# Patient Record
Sex: MALE | Race: WHITE | NOT HISPANIC OR LATINO | Employment: OTHER | URBAN - NONMETROPOLITAN AREA
[De-identification: names, ages, dates, MRNs, and addresses within clinical notes are randomized per-mention and may not be internally consistent; named-entity substitution may affect disease eponyms.]

---

## 2018-05-24 ENCOUNTER — OFFICE VISIT CONVERTED (OUTPATIENT)
Dept: FAMILY MEDICINE CLINIC | Age: 73
End: 2018-05-24
Attending: NURSE PRACTITIONER

## 2019-02-27 ENCOUNTER — OFFICE VISIT CONVERTED (OUTPATIENT)
Dept: FAMILY MEDICINE CLINIC | Age: 74
End: 2019-02-27
Attending: FAMILY MEDICINE

## 2019-03-07 ENCOUNTER — OFFICE VISIT CONVERTED (OUTPATIENT)
Dept: FAMILY MEDICINE CLINIC | Age: 74
End: 2019-03-07
Attending: FAMILY MEDICINE

## 2019-03-07 ENCOUNTER — HOSPITAL ENCOUNTER (OUTPATIENT)
Dept: OTHER | Facility: HOSPITAL | Age: 74
Discharge: HOME | End: 2019-03-07
Attending: FAMILY MEDICINE

## 2019-03-07 LAB
ALBUMIN SERPL-MCNC: 3.6 G/DL (ref 3.5–5)
ALBUMIN/GLOB SERPL: 1.3 {RATIO} (ref 1.4–2.6)
ALP SERPL-CCNC: 107 U/L (ref 56–155)
ALT SERPL-CCNC: 34 U/L (ref 10–40)
ANION GAP SERPL CALC-SCNC: 14 MMOL/L (ref 8–19)
AST SERPL-CCNC: 48 U/L (ref 15–50)
BASOPHILS # BLD MANUAL: 0.04 10*3/UL (ref 0–0.2)
BASOPHILS NFR BLD MANUAL: 0.5 % (ref 0–3)
BILIRUB SERPL-MCNC: 0.63 MG/DL (ref 0.2–1.3)
BUN SERPL-MCNC: 14 MG/DL (ref 5–25)
BUN/CREAT SERPL: 15 {RATIO} (ref 6–20)
CALCIUM SERPL-MCNC: 9.4 MG/DL (ref 8.7–10.4)
CHLORIDE SERPL-SCNC: 104 MMOL/L (ref 99–111)
CHOLEST SERPL-MCNC: 128 MG/DL (ref 107–200)
CHOLEST/HDLC SERPL: 2.2 {RATIO} (ref 3–6)
CONV CO2: 28 MMOL/L (ref 22–32)
CONV TOTAL PROTEIN: 6.4 G/DL (ref 6.3–8.2)
CREAT UR-MCNC: 0.96 MG/DL (ref 0.7–1.2)
DEPRECATED RDW RBC AUTO: 46 FL
EOSINOPHIL # BLD MANUAL: 0.06 10*3/UL (ref 0–0.7)
EOSINOPHIL NFR BLD MANUAL: 0.8 % (ref 0–7)
ERYTHROCYTE [DISTWIDTH] IN BLOOD BY AUTOMATED COUNT: 11.7 % (ref 11.5–14.5)
GFR SERPLBLD BASED ON 1.73 SQ M-ARVRAT: >60 ML/MIN/{1.73_M2}
GLOBULIN UR ELPH-MCNC: 2.8 G/DL (ref 2–3.5)
GLUCOSE SERPL-MCNC: 108 MG/DL (ref 70–99)
GRANS (ABSOLUTE): 6.1 10*3/UL (ref 2–8)
GRANS: 78.5 % (ref 30–85)
HBA1C MFR BLD: 14.2 G/DL (ref 14–18)
HCT VFR BLD AUTO: 42 % (ref 42–52)
HDLC SERPL-MCNC: 57 MG/DL (ref 40–60)
IMM GRANULOCYTES # BLD: 0 10*3/UL (ref 0–0.54)
IMM GRANULOCYTES NFR BLD: 0 % (ref 0–0.43)
LDLC SERPL CALC-MCNC: 57 MG/DL (ref 70–100)
LYMPHOCYTES # BLD MANUAL: 0.87 10*3/UL (ref 1–5)
LYMPHOCYTES NFR BLD MANUAL: 9 % (ref 3–10)
MCH RBC QN AUTO: 35.1 PG (ref 27–31)
MCHC RBC AUTO-ENTMCNC: 33.8 G/DL (ref 33–37)
MCV RBC AUTO: 103.7 FL (ref 80–96)
MONOCYTES # BLD AUTO: 0.7 10*3/UL (ref 0.2–1.2)
OSMOLALITY SERPL CALC.SUM OF ELEC: 295 MOSM/KG (ref 273–304)
PLATELET # BLD AUTO: 189 10*3/UL (ref 130–400)
PMV BLD AUTO: 10.8 FL (ref 7.4–10.4)
POTASSIUM SERPL-SCNC: 4 MMOL/L (ref 3.5–5.3)
RBC # BLD AUTO: 4.05 10*6/UL (ref 4.7–6.1)
SODIUM SERPL-SCNC: 142 MMOL/L (ref 135–147)
TRIGL SERPL-MCNC: 72 MG/DL (ref 40–150)
TSH SERPL-ACNC: 0.53 M[IU]/L (ref 0.27–4.2)
VARIANT LYMPHS NFR BLD MANUAL: 11.2 % (ref 20–45)
VLDLC SERPL-MCNC: 14 MG/DL (ref 5–37)
WBC # BLD AUTO: 7.77 10*3/UL (ref 4.8–10.8)

## 2019-03-28 ENCOUNTER — OFFICE VISIT CONVERTED (OUTPATIENT)
Dept: FAMILY MEDICINE CLINIC | Age: 74
End: 2019-03-28
Attending: FAMILY MEDICINE

## 2019-04-01 ENCOUNTER — HOSPITAL ENCOUNTER (OUTPATIENT)
Dept: OTHER | Facility: HOSPITAL | Age: 74
Discharge: HOME | End: 2019-04-01
Attending: FAMILY MEDICINE

## 2019-04-29 ENCOUNTER — OFFICE VISIT CONVERTED (OUTPATIENT)
Dept: FAMILY MEDICINE CLINIC | Age: 74
End: 2019-04-29
Attending: FAMILY MEDICINE

## 2019-04-29 ENCOUNTER — HOSPITAL ENCOUNTER (OUTPATIENT)
Dept: OTHER | Facility: HOSPITAL | Age: 74
Discharge: HOME | End: 2019-04-29
Attending: FAMILY MEDICINE

## 2019-07-15 ENCOUNTER — OFFICE VISIT CONVERTED (OUTPATIENT)
Dept: FAMILY MEDICINE CLINIC | Age: 74
End: 2019-07-15
Attending: FAMILY MEDICINE

## 2019-07-17 ENCOUNTER — HOSPITAL ENCOUNTER (OUTPATIENT)
Dept: OTHER | Facility: HOSPITAL | Age: 74
Discharge: HOME | End: 2019-07-17
Attending: FAMILY MEDICINE

## 2019-07-17 LAB
ALBUMIN SERPL-MCNC: 3.6 G/DL (ref 3.5–5)
ALBUMIN/GLOB SERPL: 1.2 {RATIO} (ref 1.4–2.6)
ALP SERPL-CCNC: 118 U/L (ref 56–155)
ALT SERPL-CCNC: 29 U/L (ref 10–40)
ANION GAP SERPL CALC-SCNC: 15 MMOL/L (ref 8–19)
AST SERPL-CCNC: 35 U/L (ref 15–50)
BASOPHILS # BLD MANUAL: 0.03 10*3/UL (ref 0–0.2)
BASOPHILS NFR BLD MANUAL: 0.6 % (ref 0–3)
BILIRUB SERPL-MCNC: 0.56 MG/DL (ref 0.2–1.3)
BUN SERPL-MCNC: 12 MG/DL (ref 5–25)
BUN/CREAT SERPL: 13 {RATIO} (ref 6–20)
CALCIUM SERPL-MCNC: 9.6 MG/DL (ref 8.7–10.4)
CHLORIDE SERPL-SCNC: 102 MMOL/L (ref 99–111)
CHOLEST SERPL-MCNC: 164 MG/DL (ref 107–200)
CHOLEST/HDLC SERPL: 2.6 {RATIO} (ref 3–6)
CONV CO2: 28 MMOL/L (ref 22–32)
CONV TOTAL PROTEIN: 6.5 G/DL (ref 6.3–8.2)
CREAT UR-MCNC: 0.91 MG/DL (ref 0.7–1.2)
DEPRECATED RDW RBC AUTO: 48.4 FL
EOSINOPHIL # BLD MANUAL: 0.34 10*3/UL (ref 0–0.7)
EOSINOPHIL NFR BLD MANUAL: 6.8 % (ref 0–7)
ERYTHROCYTE [DISTWIDTH] IN BLOOD BY AUTOMATED COUNT: 14.5 % (ref 11.5–14.5)
GFR SERPLBLD BASED ON 1.73 SQ M-ARVRAT: >60 ML/MIN/{1.73_M2}
GLOBULIN UR ELPH-MCNC: 2.9 G/DL (ref 2–3.5)
GLUCOSE SERPL-MCNC: 98 MG/DL (ref 70–99)
GRANS (ABSOLUTE): 2.93 10*3/UL (ref 2–8)
GRANS: 58.9 % (ref 30–85)
HBA1C MFR BLD: 13.7 G/DL (ref 14–18)
HCT VFR BLD AUTO: 39.5 % (ref 42–52)
HDLC SERPL-MCNC: 63 MG/DL (ref 40–60)
IMM GRANULOCYTES # BLD: 0.01 10*3/UL (ref 0–0.54)
IMM GRANULOCYTES NFR BLD: 0.2 % (ref 0–0.43)
LDLC SERPL CALC-MCNC: 87 MG/DL (ref 70–100)
LYMPHOCYTES # BLD MANUAL: 1.32 10*3/UL (ref 1–5)
LYMPHOCYTES NFR BLD MANUAL: 7 % (ref 3–10)
MCH RBC QN AUTO: 31.1 PG (ref 27–31)
MCHC RBC AUTO-ENTMCNC: 34.7 G/DL (ref 33–37)
MCV RBC AUTO: 89.8 FL (ref 80–96)
MONOCYTES # BLD AUTO: 0.35 10*3/UL (ref 0.2–1.2)
OSMOLALITY SERPL CALC.SUM OF ELEC: 292 MOSM/KG (ref 273–304)
PLATELET # BLD AUTO: 170 10*3/UL (ref 130–400)
PMV BLD AUTO: 12 FL (ref 7.4–10.4)
POTASSIUM SERPL-SCNC: 4.1 MMOL/L (ref 3.5–5.3)
RBC # BLD AUTO: 4.4 10*6/UL (ref 4.7–6.1)
SODIUM SERPL-SCNC: 141 MMOL/L (ref 135–147)
TRIGL SERPL-MCNC: 68 MG/DL (ref 40–150)
TSH SERPL-ACNC: 0.46 M[IU]/L (ref 0.27–4.2)
VARIANT LYMPHS NFR BLD MANUAL: 26.5 % (ref 20–45)
VLDLC SERPL-MCNC: 14 MG/DL (ref 5–37)
WBC # BLD AUTO: 4.98 10*3/UL (ref 4.8–10.8)

## 2019-08-19 ENCOUNTER — OFFICE VISIT CONVERTED (OUTPATIENT)
Dept: FAMILY MEDICINE CLINIC | Age: 74
End: 2019-08-19
Attending: FAMILY MEDICINE

## 2019-09-26 ENCOUNTER — OFFICE VISIT CONVERTED (OUTPATIENT)
Dept: FAMILY MEDICINE CLINIC | Age: 74
End: 2019-09-26
Attending: FAMILY MEDICINE

## 2019-09-26 ENCOUNTER — HOSPITAL ENCOUNTER (OUTPATIENT)
Dept: OTHER | Facility: HOSPITAL | Age: 74
Discharge: HOME | End: 2019-09-26
Attending: FAMILY MEDICINE

## 2019-09-26 LAB
ALBUMIN SERPL-MCNC: 3.9 G/DL (ref 3.5–5)
ALBUMIN/GLOB SERPL: 1.4 {RATIO} (ref 1.4–2.6)
ALP SERPL-CCNC: 96 U/L (ref 56–155)
ALT SERPL-CCNC: 13 U/L (ref 10–40)
ANION GAP SERPL CALC-SCNC: 16 MMOL/L (ref 8–19)
AST SERPL-CCNC: 24 U/L (ref 15–50)
BASOPHILS # BLD AUTO: 0.04 10*3/UL (ref 0–0.2)
BASOPHILS NFR BLD AUTO: 0.7 % (ref 0–3)
BILIRUB SERPL-MCNC: 0.33 MG/DL (ref 0.2–1.3)
BUN SERPL-MCNC: 22 MG/DL (ref 5–25)
BUN/CREAT SERPL: 22 {RATIO} (ref 6–20)
CALCIUM SERPL-MCNC: 9.7 MG/DL (ref 8.7–10.4)
CHLORIDE SERPL-SCNC: 102 MMOL/L (ref 99–111)
CONV ABS IMM GRAN: 0.01 10*3/UL (ref 0–0.2)
CONV CO2: 27 MMOL/L (ref 22–32)
CONV IMMATURE GRAN: 0.2 % (ref 0–1.8)
CONV TOTAL PROTEIN: 6.7 G/DL (ref 6.3–8.2)
CREAT UR-MCNC: 0.98 MG/DL (ref 0.7–1.2)
DEPRECATED RDW RBC AUTO: 45.3 FL (ref 35.1–43.9)
EOSINOPHIL # BLD AUTO: 0.63 10*3/UL (ref 0–0.7)
EOSINOPHIL # BLD AUTO: 10.3 % (ref 0–7)
ERYTHROCYTE [DISTWIDTH] IN BLOOD BY AUTOMATED COUNT: 13.1 % (ref 11.6–14.4)
GFR SERPLBLD BASED ON 1.73 SQ M-ARVRAT: >60 ML/MIN/{1.73_M2}
GLOBULIN UR ELPH-MCNC: 2.8 G/DL (ref 2–3.5)
GLUCOSE SERPL-MCNC: 96 MG/DL (ref 70–99)
HCT VFR BLD AUTO: 41 % (ref 42–52)
HGB BLD-MCNC: 13.7 G/DL (ref 14–18)
LYMPHOCYTES # BLD AUTO: 1.33 10*3/UL (ref 1–5)
LYMPHOCYTES NFR BLD AUTO: 21.7 % (ref 20–45)
MCH RBC QN AUTO: 31.4 PG (ref 27–31)
MCHC RBC AUTO-ENTMCNC: 33.4 G/DL (ref 33–37)
MCV RBC AUTO: 94 FL (ref 80–96)
MONOCYTES # BLD AUTO: 0.5 10*3/UL (ref 0.2–1.2)
MONOCYTES NFR BLD AUTO: 8.2 % (ref 3–10)
NEUTROPHILS # BLD AUTO: 3.61 10*3/UL (ref 2–8)
NEUTROPHILS NFR BLD AUTO: 58.9 % (ref 30–85)
NRBC CBCN: 0 % (ref 0–0.7)
OSMOLALITY SERPL CALC.SUM OF ELEC: 295 MOSM/KG (ref 273–304)
PLATELET # BLD AUTO: 160 10*3/UL (ref 130–400)
PMV BLD AUTO: 12.9 FL (ref 9.4–12.4)
POTASSIUM SERPL-SCNC: 4.2 MMOL/L (ref 3.5–5.3)
RBC # BLD AUTO: 4.36 10*6/UL (ref 4.7–6.1)
SODIUM SERPL-SCNC: 141 MMOL/L (ref 135–147)
T4 FREE SERPL-MCNC: 1.6 NG/DL (ref 0.9–1.8)
TSH SERPL-ACNC: 0.05 M[IU]/L (ref 0.27–4.2)
WBC # BLD AUTO: 6.12 10*3/UL (ref 4.8–10.8)

## 2019-09-30 LAB
ASO AB SERPL-ACNC: 26 [IU]/ML (ref 0–200)
CONV ANTI NUCLEAR ANTIBODY WITH REFLEX: NEGATIVE
CONV RHEUMATOID FACTOR IGM: 58.2 [IU]/ML (ref 0–14)
CRP SERPL-MCNC: 4.2 MG/L (ref 0–5)
ERYTHROCYTE [SEDIMENTATION RATE] IN BLOOD: 14 MM/H (ref 0–20)
PHOSPHATE SERPL-MCNC: 3.5 MG/DL (ref 2.4–4.5)
URATE SERPL-MCNC: 6.1 MG/DL (ref 3.5–8.5)

## 2019-10-18 ENCOUNTER — OFFICE VISIT CONVERTED (OUTPATIENT)
Dept: FAMILY MEDICINE CLINIC | Age: 74
End: 2019-10-18
Attending: FAMILY MEDICINE

## 2019-12-10 ENCOUNTER — OFFICE VISIT CONVERTED (OUTPATIENT)
Dept: CARDIOLOGY | Facility: CLINIC | Age: 74
End: 2019-12-10
Attending: INTERNAL MEDICINE

## 2020-01-08 ENCOUNTER — OFFICE VISIT CONVERTED (OUTPATIENT)
Dept: FAMILY MEDICINE CLINIC | Age: 75
End: 2020-01-08
Attending: FAMILY MEDICINE

## 2020-01-09 ENCOUNTER — HOSPITAL ENCOUNTER (OUTPATIENT)
Dept: OTHER | Facility: HOSPITAL | Age: 75
Discharge: HOME | End: 2020-01-09
Attending: FAMILY MEDICINE

## 2020-01-09 LAB
ALBUMIN SERPL-MCNC: 3.8 G/DL (ref 3.5–5)
ALBUMIN/GLOB SERPL: 1.3 {RATIO} (ref 1.4–2.6)
ALP SERPL-CCNC: 126 U/L (ref 56–155)
ALT SERPL-CCNC: 28 U/L (ref 10–40)
ANION GAP SERPL CALC-SCNC: 18 MMOL/L (ref 8–19)
AST SERPL-CCNC: 30 U/L (ref 15–50)
BASOPHILS # BLD MANUAL: 0.07 10*3/UL (ref 0–0.2)
BASOPHILS NFR BLD MANUAL: 1.1 % (ref 0–3)
BILIRUB SERPL-MCNC: 0.28 MG/DL (ref 0.2–1.3)
BUN SERPL-MCNC: 19 MG/DL (ref 5–25)
BUN/CREAT SERPL: 21 {RATIO} (ref 6–20)
CALCIUM SERPL-MCNC: 9.8 MG/DL (ref 8.7–10.4)
CHLORIDE SERPL-SCNC: 101 MMOL/L (ref 99–111)
CHOLEST SERPL-MCNC: 159 MG/DL (ref 107–200)
CHOLEST/HDLC SERPL: 4.3 {RATIO} (ref 3–6)
CONV CO2: 28 MMOL/L (ref 22–32)
CONV TOTAL PROTEIN: 6.7 G/DL (ref 6.3–8.2)
CREAT UR-MCNC: 0.92 MG/DL (ref 0.7–1.2)
DEPRECATED RDW RBC AUTO: 44.5 FL
EOSINOPHIL # BLD MANUAL: 0.34 10*3/UL (ref 0–0.7)
EOSINOPHIL NFR BLD MANUAL: 5.1 % (ref 0–7)
ERYTHROCYTE [DISTWIDTH] IN BLOOD BY AUTOMATED COUNT: 13 % (ref 11.5–14.5)
GFR SERPLBLD BASED ON 1.73 SQ M-ARVRAT: >60 ML/MIN/{1.73_M2}
GLOBULIN UR ELPH-MCNC: 2.9 G/DL (ref 2–3.5)
GLUCOSE SERPL-MCNC: 76 MG/DL (ref 70–99)
GRANS (ABSOLUTE): 4.42 10*3/UL (ref 2–8)
GRANS: 66.9 % (ref 30–85)
HBA1C MFR BLD: 14.7 G/DL (ref 14–18)
HCT VFR BLD AUTO: 43.2 % (ref 42–52)
HDLC SERPL-MCNC: 37 MG/DL (ref 40–60)
IMM GRANULOCYTES # BLD: 0.02 10*3/UL (ref 0–0.54)
IMM GRANULOCYTES NFR BLD: 0.3 % (ref 0–0.43)
LDLC SERPL CALC-MCNC: 89 MG/DL (ref 70–100)
LYMPHOCYTES # BLD MANUAL: 1.23 10*3/UL (ref 1–5)
LYMPHOCYTES NFR BLD MANUAL: 8 % (ref 3–10)
MCH RBC QN AUTO: 31.5 PG (ref 27–31)
MCHC RBC AUTO-ENTMCNC: 34 G/DL (ref 33–37)
MCV RBC AUTO: 92.7 FL (ref 80–96)
MONOCYTES # BLD AUTO: 0.53 10*3/UL (ref 0.2–1.2)
OSMOLALITY SERPL CALC.SUM OF ELEC: 297 MOSM/KG (ref 273–304)
PLATELET # BLD AUTO: 229 10*3/UL (ref 130–400)
PMV BLD AUTO: 10.2 FL (ref 7.4–10.4)
POTASSIUM SERPL-SCNC: 4.4 MMOL/L (ref 3.5–5.3)
PSA SERPL-MCNC: 3.97 NG/ML (ref 0–4)
RBC # BLD AUTO: 4.66 10*6/UL (ref 4.7–6.1)
SODIUM SERPL-SCNC: 143 MMOL/L (ref 135–147)
T4 FREE SERPL-MCNC: 1.7 NG/DL (ref 0.9–1.8)
TRIGL SERPL-MCNC: 165 MG/DL (ref 40–150)
TSH SERPL-ACNC: 0.08 M[IU]/L (ref 0.27–4.2)
VARIANT LYMPHS NFR BLD MANUAL: 18.6 % (ref 20–45)
VLDLC SERPL-MCNC: 33 MG/DL (ref 5–37)
WBC # BLD AUTO: 6.61 10*3/UL (ref 4.8–10.8)

## 2020-01-11 LAB
ASO AB SERPL-ACNC: 20 [IU]/ML (ref 0–200)
CONV RHEUMATOID FACTOR IGM: 65.9 [IU]/ML (ref 0–14)
CRP SERPL-MCNC: 11.1 MG/L (ref 0–5)
DSDNA AB SER-ACNC: NEGATIVE [IU]/ML
ENA AB SER IA-ACNC: NEGATIVE {RATIO}
ERYTHROCYTE [SEDIMENTATION RATE] IN BLOOD: 25 MM/H (ref 0–20)
PHOSPHATE SERPL-MCNC: 3.3 MG/DL (ref 2.4–4.5)
URATE SERPL-MCNC: 5.9 MG/DL (ref 3.5–8.5)

## 2020-04-08 ENCOUNTER — OFFICE VISIT CONVERTED (OUTPATIENT)
Dept: FAMILY MEDICINE CLINIC | Age: 75
End: 2020-04-08
Attending: FAMILY MEDICINE

## 2020-05-21 ENCOUNTER — CONVERSION ENCOUNTER (OUTPATIENT)
Dept: FAMILY MEDICINE CLINIC | Age: 75
End: 2020-05-21

## 2020-05-21 ENCOUNTER — OFFICE VISIT CONVERTED (OUTPATIENT)
Dept: FAMILY MEDICINE CLINIC | Age: 75
End: 2020-05-21
Attending: FAMILY MEDICINE

## 2020-08-24 ENCOUNTER — OFFICE VISIT CONVERTED (OUTPATIENT)
Dept: FAMILY MEDICINE CLINIC | Age: 75
End: 2020-08-24
Attending: FAMILY MEDICINE

## 2020-11-11 ENCOUNTER — OFFICE VISIT CONVERTED (OUTPATIENT)
Dept: FAMILY MEDICINE CLINIC | Age: 75
End: 2020-11-11
Attending: FAMILY MEDICINE

## 2020-11-12 ENCOUNTER — HOSPITAL ENCOUNTER (OUTPATIENT)
Dept: OTHER | Facility: HOSPITAL | Age: 75
Discharge: HOME | End: 2020-11-12
Attending: FAMILY MEDICINE

## 2020-11-12 LAB
ALBUMIN SERPL-MCNC: 4 G/DL (ref 3.5–5)
ALBUMIN/GLOB SERPL: 1.5 {RATIO} (ref 1.4–2.6)
ALP SERPL-CCNC: 146 U/L (ref 56–155)
ALT SERPL-CCNC: 23 U/L (ref 10–40)
ANION GAP SERPL CALC-SCNC: 13 MMOL/L (ref 8–19)
AST SERPL-CCNC: 23 U/L (ref 15–50)
BASOPHILS # BLD MANUAL: 0.03 10*3/UL (ref 0–0.2)
BASOPHILS NFR BLD MANUAL: 0.5 % (ref 0–3)
BILIRUB SERPL-MCNC: 0.48 MG/DL (ref 0.2–1.3)
BUN SERPL-MCNC: 19 MG/DL (ref 5–25)
BUN/CREAT SERPL: 13 {RATIO} (ref 6–20)
CALCIUM SERPL-MCNC: 9.3 MG/DL (ref 8.7–10.4)
CHLORIDE SERPL-SCNC: 103 MMOL/L (ref 99–111)
CHOLEST SERPL-MCNC: 174 MG/DL (ref 107–200)
CHOLEST/HDLC SERPL: 4.5 {RATIO} (ref 3–6)
CONV CO2: 28 MMOL/L (ref 22–32)
CONV TOTAL PROTEIN: 6.7 G/DL (ref 6.3–8.2)
CREAT UR-MCNC: 1.41 MG/DL (ref 0.7–1.2)
DEPRECATED RDW RBC AUTO: 45.8 FL
EOSINOPHIL # BLD MANUAL: 0.49 10*3/UL (ref 0–0.7)
EOSINOPHIL NFR BLD MANUAL: 7.9 % (ref 0–7)
ERYTHROCYTE [DISTWIDTH] IN BLOOD BY AUTOMATED COUNT: 13.9 % (ref 11.5–14.5)
GFR SERPLBLD BASED ON 1.73 SQ M-ARVRAT: 48 ML/MIN/{1.73_M2}
GLOBULIN UR ELPH-MCNC: 2.7 G/DL (ref 2–3.5)
GLUCOSE SERPL-MCNC: 112 MG/DL (ref 70–99)
GRANS (ABSOLUTE): 3.98 10*3/UL (ref 2–8)
GRANS: 64.1 % (ref 30–85)
HBA1C MFR BLD: 15.9 G/DL (ref 14–18)
HCT VFR BLD AUTO: 45.8 % (ref 42–52)
HDLC SERPL-MCNC: 39 MG/DL (ref 40–60)
IMM GRANULOCYTES # BLD: 0.01 10*3/UL (ref 0–0.54)
IMM GRANULOCYTES NFR BLD: 0.2 % (ref 0–0.43)
LDLC SERPL CALC-MCNC: 105 MG/DL (ref 70–100)
LYMPHOCYTES # BLD MANUAL: 1.19 10*3/UL (ref 1–5)
LYMPHOCYTES NFR BLD MANUAL: 8.1 % (ref 3–10)
MCH RBC QN AUTO: 30.9 PG (ref 27–31)
MCHC RBC AUTO-ENTMCNC: 34.7 G/DL (ref 33–37)
MCV RBC AUTO: 88.9 FL (ref 80–96)
MONOCYTES # BLD AUTO: 0.5 10*3/UL (ref 0.2–1.2)
OSMOLALITY SERPL CALC.SUM OF ELEC: 293 MOSM/KG (ref 273–304)
PLATELET # BLD AUTO: 195 10*3/UL (ref 130–400)
PMV BLD AUTO: 11.3 FL (ref 7.4–10.4)
POTASSIUM SERPL-SCNC: 4 MMOL/L (ref 3.5–5.3)
RBC # BLD AUTO: 5.15 10*6/UL (ref 4.7–6.1)
SODIUM SERPL-SCNC: 140 MMOL/L (ref 135–147)
T4 FREE SERPL-MCNC: 1 NG/DL (ref 0.9–1.8)
TRIGL SERPL-MCNC: 150 MG/DL (ref 40–150)
TSH SERPL-ACNC: 10.84 M[IU]/L (ref 0.27–4.2)
VARIANT LYMPHS NFR BLD MANUAL: 19.2 % (ref 20–45)
VLDLC SERPL-MCNC: 30 MG/DL (ref 5–37)
WBC # BLD AUTO: 6.2 10*3/UL (ref 4.8–10.8)

## 2020-11-13 LAB
CONV THYROXINE TOTAL: 6.7 UG/DL (ref 4.5–12)
T3FREE SERPL-MCNC: 2.3 PG/ML (ref 2–4.4)

## 2021-01-11 ENCOUNTER — OFFICE VISIT CONVERTED (OUTPATIENT)
Dept: FAMILY MEDICINE CLINIC | Age: 76
End: 2021-01-11
Attending: NURSE PRACTITIONER

## 2021-01-11 ENCOUNTER — HOSPITAL ENCOUNTER (OUTPATIENT)
Dept: OTHER | Facility: HOSPITAL | Age: 76
Discharge: HOME | End: 2021-01-11
Attending: NURSE PRACTITIONER

## 2021-01-11 LAB
ANION GAP SERPL CALC-SCNC: 16 MMOL/L (ref 8–19)
BUN SERPL-MCNC: 18 MG/DL (ref 5–25)
BUN/CREAT SERPL: 14 {RATIO} (ref 6–20)
CALCIUM SERPL-MCNC: 9.4 MG/DL (ref 8.7–10.4)
CHLORIDE SERPL-SCNC: 102 MMOL/L (ref 99–111)
CONV CO2: 26 MMOL/L (ref 22–32)
CREAT UR-MCNC: 1.32 MG/DL (ref 0.7–1.2)
ERYTHROCYTE [DISTWIDTH] IN BLOOD BY AUTOMATED COUNT: 13.3 % (ref 11.5–14.5)
GFR SERPLBLD BASED ON 1.73 SQ M-ARVRAT: 52 ML/MIN/{1.73_M2}
GLUCOSE SERPL-MCNC: 104 MG/DL (ref 70–99)
HBA1C MFR BLD: 16.1 G/DL (ref 14–18)
HCT VFR BLD AUTO: 47.2 % (ref 42–52)
MCH RBC QN AUTO: 31 PG (ref 27–31)
MCHC RBC AUTO-ENTMCNC: 34.1 G/DL (ref 33–37)
MCV RBC AUTO: 90.8 FL (ref 80–96)
OSMOLALITY SERPL CALC.SUM OF ELEC: 292 MOSM/KG (ref 273–304)
PLATELET # BLD AUTO: 228 10*3/UL (ref 130–400)
PMV BLD AUTO: 10.2 FL (ref 7.4–10.4)
POTASSIUM SERPL-SCNC: 4 MMOL/L (ref 3.5–5.3)
RBC # BLD AUTO: 5.2 10*6/UL (ref 4.7–6.1)
SODIUM SERPL-SCNC: 140 MMOL/L (ref 135–147)
TSH SERPL-ACNC: 1.64 M[IU]/L (ref 0.27–4.2)
WBC # BLD AUTO: 7.03 10*3/UL (ref 4.8–10.8)

## 2021-02-23 ENCOUNTER — OFFICE VISIT CONVERTED (OUTPATIENT)
Dept: FAMILY MEDICINE CLINIC | Age: 76
End: 2021-02-23
Attending: NURSE PRACTITIONER

## 2021-02-24 ENCOUNTER — HOSPITAL ENCOUNTER (OUTPATIENT)
Dept: VACCINE CLINIC | Facility: HOSPITAL | Age: 76
Discharge: HOME | End: 2021-02-24
Attending: INTERNAL MEDICINE

## 2021-04-07 ENCOUNTER — HOSPITAL ENCOUNTER (OUTPATIENT)
Dept: OTHER | Facility: HOSPITAL | Age: 76
Discharge: HOME | End: 2021-04-07
Attending: NURSE PRACTITIONER

## 2021-04-07 ENCOUNTER — OFFICE VISIT CONVERTED (OUTPATIENT)
Dept: FAMILY MEDICINE CLINIC | Age: 76
End: 2021-04-07
Attending: NURSE PRACTITIONER

## 2021-04-07 LAB
ALBUMIN SERPL-MCNC: 4.1 G/DL (ref 3.5–5)
ALBUMIN/GLOB SERPL: 1.3 {RATIO} (ref 1.4–2.6)
ALP SERPL-CCNC: 132 U/L (ref 56–155)
ALT SERPL-CCNC: 73 U/L (ref 10–40)
ANION GAP SERPL CALC-SCNC: 15 MMOL/L (ref 8–19)
AST SERPL-CCNC: 47 U/L (ref 15–50)
BILIRUB SERPL-MCNC: 0.38 MG/DL (ref 0.2–1.3)
BUN SERPL-MCNC: 21 MG/DL (ref 5–25)
BUN/CREAT SERPL: 18 {RATIO} (ref 6–20)
CALCIUM SERPL-MCNC: 9.8 MG/DL (ref 8.7–10.4)
CHLORIDE SERPL-SCNC: 104 MMOL/L (ref 99–111)
CONV CO2: 25 MMOL/L (ref 22–32)
CONV TOTAL PROTEIN: 7.2 G/DL (ref 6.3–8.2)
CREAT UR-MCNC: 1.2 MG/DL (ref 0.7–1.2)
GFR SERPLBLD BASED ON 1.73 SQ M-ARVRAT: 59 ML/MIN/{1.73_M2}
GLOBULIN UR ELPH-MCNC: 3.1 G/DL (ref 2–3.5)
GLUCOSE SERPL-MCNC: 104 MG/DL (ref 70–99)
OSMOLALITY SERPL CALC.SUM OF ELEC: 291 MOSM/KG (ref 273–304)
POTASSIUM SERPL-SCNC: 4.5 MMOL/L (ref 3.5–5.3)
SODIUM SERPL-SCNC: 139 MMOL/L (ref 135–147)
T4 FREE SERPL-MCNC: 1.3 NG/DL (ref 0.9–1.8)
TSH SERPL-ACNC: 14.28 M[IU]/L (ref 0.27–4.2)

## 2021-05-15 VITALS
HEART RATE: 58 BPM | SYSTOLIC BLOOD PRESSURE: 165 MMHG | HEIGHT: 68 IN | BODY MASS INDEX: 21.52 KG/M2 | WEIGHT: 142 LBS | DIASTOLIC BLOOD PRESSURE: 74 MMHG

## 2021-05-17 ENCOUNTER — HOSPITAL ENCOUNTER (OUTPATIENT)
Dept: OTHER | Facility: HOSPITAL | Age: 76
Discharge: HOME | End: 2021-05-17
Attending: NURSE PRACTITIONER

## 2021-05-17 ENCOUNTER — OFFICE VISIT CONVERTED (OUTPATIENT)
Dept: FAMILY MEDICINE CLINIC | Age: 76
End: 2021-05-17
Attending: NURSE PRACTITIONER

## 2021-05-17 LAB
ERYTHROCYTE [DISTWIDTH] IN BLOOD BY AUTOMATED COUNT: 13 % (ref 11.5–14.5)
HBA1C MFR BLD: 14.6 G/DL (ref 14–18)
HCT VFR BLD AUTO: 43.8 % (ref 42–52)
MCH RBC QN AUTO: 30.6 PG (ref 27–31)
MCHC RBC AUTO-ENTMCNC: 33.3 G/DL (ref 33–37)
MCV RBC AUTO: 91.8 FL (ref 80–96)
PLATELET # BLD AUTO: 227 10*3/UL (ref 130–400)
PMV BLD AUTO: 10.3 FL (ref 7.4–10.4)
RBC # BLD AUTO: 4.77 10*6/UL (ref 4.7–6.1)
TSH SERPL-ACNC: 0.35 M[IU]/L (ref 0.27–4.2)
WBC # BLD AUTO: 6.3 10*3/UL (ref 4.8–10.8)

## 2021-05-18 NOTE — PROGRESS NOTES
"Dixon Amador  1945     Office/Outpatient Visit    Visit Date: Wed, Jan 8, 2020 12:00 pm    Provider: Ilir Mireles MD (Assistant: Trinidad Granados MA)    Location: [facility]        Electronically signed by Ilir Mireles MD on  01/16/2020 10:27:44 AM                             Subjective:        CC: Long is a 74 year old White male.  This is a follow-up visit.          HPI:       BP today is 142/68 with a HR of 61. He is on lisinopril 40 mg qd, metoprolol 100 mg qd, HCTZ 12.5 mg qd, and amlodipine 10 mg qd. He checks about once a week and its been a \"tad high\". No headaches or blurry vision.      Pt has a h/o rheumatoid arthritis, this was Dx'd about 5 years ago by Beau in [facility]. He has never been treated for this due to paucity of Sx. In September, arthritis profile is positive for rheumatoid factor. ESR and CRP are normal. He has polyarthralgia and joint stiffness in the morning (right hand, left hand, both shoulders, and lower back), this gets better throughout the day. I gave pt a steroid shot in left shoulder he reports this was a miracle cure, his shoulder pain improved dramatically.      Pt has hypothyroidism that developed after radiation treatment for throat cancer. Most recent TSH was 0.05 in September 2019. I reduced his dose of levothyroxine from 137 to 125 mcg/day. He stays cold anyway but this is not worse.    ROS:     CONSTITUTIONAL:  Negative for fatigue and fever.      E/N/T:  Negative for diminished hearing and nasal congestion.      CARDIOVASCULAR:  Negative for chest pain, orthopnea and palpitations.      RESPIRATORY:  Positive for dyspnea ( with moderate exertion ).   Negative for recent cough.      GASTROINTESTINAL:  Positive for constipation ( resolved ).   Negative for abdominal pain, diarrhea, nausea or vomiting.      MUSCULOSKELETAL:  Positive for arthralgias and (left shoulder) back pain.   Negative for myalgias.      NEUROLOGICAL:  Negative for " headaches and weakness.      PSYCHIATRIC:  Positive for insomnia.   Negative for anxiety or depression.          Past Medical History / Family History / Social History:         Last Reviewed on 1/08/2020 07:23 PM by Ilir Mireles    Past Medical History:             PAST MEDICAL HISTORY         Hypertension     Pneumonia     Hypothyroidism: after radiation;     tonsil CA - chemo and radiation - 11 years ago - now cleared     surgery for busted blood vessel         CURRENT MEDICAL PROVIDERS:    Oncologist: Dr. Dr. Pantoja in [facility]             COLORECTAL CANCER SCREENING: Up to date (colonoscopy q10y; sigmoidoscopy q5y; Cologuard q3y) was last done patient thinks it in 2014; colonoscopy with normal results; Dr. Benitez         Surgical History:         throat cancer resection in 2008;    Back surgery X 2 in 1987;         Family History:     Father: Cause of death was MI     Mother: Healthy     Paternal Grandfather: Cause of death was MI     Maternal Grandfather: Cause of death was MI         Social History:     Occupation: Retired (Prior occupation: [employer])     Marital Status:  ( and remarried)     Children: 1 step-child         Tobacco/Alcohol/Supplements:     Last Reviewed on 1/08/2020 07:23 PM by Ilir Mireles    Tobacco: He has a past history of cigarette smoking; quit date:  1999.          Alcohol: Frequency: Socially         Substance Abuse History:     Last Reviewed on 1/08/2020 07:23 PM by Ilir Mireles    None         Mental Health History:     Last Reviewed on 1/08/2020 07:23 PM by Ilir Mireles    NEGATIVE         Communicable Diseases (eg STDs):     Last Reviewed on 1/08/2020 07:23 PM by Ilir Mireles    Reportable health conditions; NEGATIVE         Current Problems:     Last Reviewed on 1/08/2020 07:23 PM by Ilir Mireles    Malignant neoplasm of pharynx, unspecified    Polycythemia vera    Hypothyroidism, unspecified    Other  specified anxiety disorders    Syncope and collapse    Radiculopathy, lumbar region    Essential (primary) hypertension    Other specified disorders of bone, multiple sites    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    Primary insomnia    Constipation, unspecified    Shortness of breath    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, right hand    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, left hand    Encounter for screening for malignant neoplasm of prostate        Immunizations:     Fluzone (3 + years dose) 11/1/2017    Fluad pf 9/4/2019    Influenza Virus Vaccine, unspecified formulation 11/1/2018    PNEUMOVAX 23 (Pneumococcal PPV23) 3/28/2019        Allergies:     Last Reviewed on 1/08/2020 07:23 PM by Ilir Mireles    Atorvastatin Calcium: Liver function abnormalities  (Adverse Reaction)        Current Medications:     Last Reviewed on 1/08/2020 07:23 PM by Ilir Mireles    levothyroxine 125 mcg oral tablet [TAKE 1 TABLET BY MOUTH DAILY BEFORE BREAKFAST]    Melatonin     traZODone 50 mg oral tablet [TAKE 1 TO 2 TABLETS BY MOUTH EVERY NIGHT AT BEDTIME AS NEEDED]    hydroCHLOROthiazide 12.5 mg oral tablet [TAKE 1 TABLET BY MOUTH DAILY]    Vitamin B-12 500 mcg oral tablet [1 tab daily]    AMLODIPINE   TAB 5MG    METOPROL SUC TAB 100MG ER        Objective:        Vitals:         Current: 1/8/2020 12:05:21 PM    Ht:  5 ft, 9 in;  Wt: 137 lbs;  BMI: 20.2T: 98.4 F (oral);  BP: 142/68 mm Hg (left arm, sitting);  P: 61 bpm (left arm (BP Cuff), sitting);  sCr: 0.98 mg/dL;  GFR: 56.85        Exams:     PHYSICAL EXAM:     GENERAL: Vitals recorded well developed, well nourished, thin;  well groomed;  no apparent distress;     EYES: PERRL, EOMI     E/N/T: OROPHARYNX:  normal mucosa, dentition, gingiva, and posterior pharynx;     NECK: range of motion is normal;     RESPIRATORY: normal respiratory rate and pattern with no distress; normal breath sounds with no rales, rhonchi, wheezes or  rubs;     CARDIOVASCULAR: normal rate; rhythm is regular;  no systolic murmur; no edema;     GASTROINTESTINAL: nontender; normal bowel sounds; no masses;     MUSCULOSKELETAL: normal gait; no limb or joint pain with range of motion; muscle strength: 5/5 in all major muscle groups;  normal overall tone swelling PIPs bilaterally;     NEUROLOGIC: mental status: alert and oriented x 3; GROSSLY INTACT     PSYCHIATRIC:  appropriate affect and demeanor; normal speech pattern; grossly normal memory;         Assessment:         I10   Essential (primary) hypertension       M06.041   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, right hand       E03.2   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 MALE PHYSICAL: CMP, CBC,LIPID, PSA, TSH [id number], 91625, [id number], [id number], 67266  (Send-Out)            FUTURE  Future order to be done at patients convenience  (Send-Out)            [id number]  [facility] Arthritis Profile  (Send-Out)            APPTO  Appointment need  (In-House)                      Plan:         Essential (primary) hypertensionBP slightly elevated but permissible, cont lisinopril 40 mg qd, metoprolol 100 mg qd, HCTZ 12.5 mg qd, and amlodipine 10 mg qd.        FOLLOW-UP: Schedule a follow-up visit in 3 months.:.      FOLLOW-UP TESTING #1: FOLLOW-UP LABORATORY:  Labs to be scheduled in the future include MALE PHYS: CMP, CBC, LIPID, PSA, TSH.            Orders:       FUTURE  Future order to be done at patients convenience  (Send-Out)            [id number]  [facility] MALE  PHYSICAL: CMP, CBC,LIPID, PSA, TSH 64643, [id number], 27158, 61409, 93611  (Send-Out)            APPTO  Appointment need  (In-House)              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, right handPt declines referral or treatment for rheumatoid arthritis. Will recheck arthritis panel. Pt advised if pain worsens I am happy to initiate a referral.         FOLLOW-UP TESTING #1: FOLLOW-UP LABORATORY:  Labs to be scheduled in the future include Arthritis Panel.            Orders:       FUTURE  Future order to be done at patients convenience  (Send-Out)            [id number]  [facility] Arthritis Profile  (Send-Out)              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TSH is again low so levothyroxine is reduced from 125 mcg/day to alternating between 125 and 112.           Prescriptions:       [Refilled] levothyroxine 112 mcg oral tablet [alternate between 112 and 125 mcg tabs every other day; take 1 tablet mouth on an empty stomach once daily], #90 (ninety) tablets, Refills: 0 (zero)             Patient Recommendations:        For  Essential (primary) hypertension:    Schedule a follow-up visit in 3 months.                APPOINTMENT INFORMATION:        Monday Tuesday Wednesday Thursday Friday Saturday Sunday            Time:___________________AM  PM   Date:_____________________         The following laboratory testing has been ordered:         For  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, right hand:            The following laboratory testing has been ordered:             Charge Capture:         Primary Diagnosis:     I10  Essential (primary) hypertension           Orders:      [id number]  Office/outpatient visit; established patient, level 4  (In-House)            APPTO  Appointment need  (In-House)              M06.041  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, right hand     E03.2  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

## 2021-05-18 NOTE — PROGRESS NOTES
Dixon Amador 1945     Office/Outpatient Visit    Visit Date: Mon, Jul 15, 2019 03:21 pm    Provider: Ilir Mireles MD (Assistant: Hien Cavazos MA)    Location: [facility]        Electronically signed by Ilir Mireles MD on  07/28/2019 02:54:11 PM                             SUBJECTIVE:        CC:     Mr. Amador is a 73 year old White male.  This is a follow-up visit.  check up         HPI:     Dx'd with throat cancer 11 years ago. He is s/p surgical resection, radiation, and chemo with Dr. Merritt. Pt sees Dr. Pantoja every 1-3 months for monitoring. Most recent visit with Dr. Pantoja was about 3 weeks ago and he was concerned about pt's weight loss. He also has decreased energy and gets tired easily. Dr. Yanez did US of abdomen and x-rays of chest and labs and he shanon get results in about a month.     Pt has insomnia, he has previously been on ambien but this made him groggy the next day. I started him on trazadone and this has helped.     BP was very high at initial visit. He is on metoprolol 50 mg BID, lisnopril 40 mg qd, and amlodipine 5 mg qd BP and HR are much better controlled today.     Pt reports constipation that has improved recently with miralax and senna. Previous w/u benign at [facility] ER in April.     ROS:     CONSTITUTIONAL:  Negative for fatigue and fever.      E/N/T:  Negative for diminished hearing and nasal congestion.      CARDIOVASCULAR:  Negative for chest pain and palpitations.      RESPIRATORY:  Negative for recent cough and dyspnea.      GASTROINTESTINAL:  Positive for constipation ( resolved ).   Negative for abdominal pain, diarrhea, nausea or vomiting.      MUSCULOSKELETAL:  Positive for back pain.   Negative for arthralgias or myalgias.      NEUROLOGICAL:  Negative for headaches and weakness.      PSYCHIATRIC:  Positive for insomnia.   Negative for anxiety or depression.          PMH/FMH/SH:     Last Reviewed on 5/04/2019 02:54 PM by Ilir Mireles  Medical History:             PAST MEDICAL HISTORY         Hypertension     Pneumonia     Hypothyroidism: after radiation;     tonsil CA - chemo and radiation - 11 years ago - now cleared     surgery for busted blood vessel         CURRENT MEDICAL PROVIDERS:    Oncologist: Dr. Dr. Pantoja in [facility]             COLORECTAL CANCER SCREENING: Up to date (colonoscopy q10y; sigmoidoscopy q5y; Cologuard q3y) was last done patient thinks it in 2014; colonoscopy with normal results; Dr. Benitez         Surgical History:         Throat surgery for CA;    Back surgery X 2 in 1987;         Family History:     Father: Cause of death was MI     Mother: Healthy     Paternal Grandfather: Cause of death was MI     Maternal Grandfather: Cause of death was MI         Social History:     Occupation: Retired (Prior occupation: [employer])     Marital Status:  ( and remarried)     Children: 1 step-child         Tobacco/Alcohol/Supplements:     Last Reviewed on 5/04/2019 02:54 PM by Ilir Mireles    Tobacco: He has a past history of cigarette smoking; quit date:  1999.          Alcohol: Frequency: Socially         Substance Abuse History:     Last Reviewed on 5/04/2019 02:54 PM by Ilir Mireles    None         Mental Health History:     Last Reviewed on 5/04/2019 02:54 PM by Ilir Mireles    NEGATIVE         Communicable Diseases (eg STDs):     Last Reviewed on 5/04/2019 02:54 PM by Ilir Mireles    Reportable health conditions; NEGATIVE             Current Problems:     Last Reviewed on 5/04/2019 02:54 PM by Ilir Mireles    Acquired hypothyroidism, other iatrogenic cause     Osteopenia     Patient visit for long term (current) drug use; other     Benign HTN     Syncope     Polycythemia vera     Acquired hypothyroidism, other specified cause     Chronic low back pain     Carcinoma of throat     Hypertensive urgency     Insomnia         Immunizations:     Fluzone (3 + years  dose) 11/1/2017     Influenza Virus Vaccine, unspecified formulation 11/1/2018     PNEUMOVAX 23 (Pneumococcal PPV23) 3/28/2019         Allergies:     Last Reviewed on 5/04/2019 02:54 PM by Ilir Mireles      No Known Drug Allergies.     Atorvastatin Calcium: Liver function abnormalities (Adverse Reaction)        Current Medications:     Last Reviewed on 5/04/2019 02:54 PM by Ilir Mireles    Levothyroxine Sodium 0.15mg Tablet Take 1 tablet(s) by mouth daily     Metoprolol Succinate 50mg Tablets, Extended Release Take 1 tablet(s) by mouth BID     Amlodipine  5mg Tablet Take 1 tablet daily for Hypertension     Melatonin 5mg Tablet Take 1-2 tablet(s) by mouth at bedtime     Trazodone HCl 50mg Tablet 1 - 2 @HS         OBJECTIVE:        Vitals:         Current: 7/15/2019 3:27:25 PM    Ht:  5 ft, 9 in;  Wt: 126.8 lbs;  BMI: 18.7    T: 98.3 F (oral);  BP: 140/63 mm Hg (left arm, sitting);  P: 56 bpm (left arm (BP Cuff), sitting);  sCr: 0.96 mg/dL;  GFR: 56.98        Exams:     PHYSICAL EXAM:     GENERAL: Vitals recorded well developed, well nourished, thin;  well groomed;  no apparent distress;     E/N/T: OROPHARYNX:  normal mucosa, dentition, gingiva, and posterior pharynx;     RESPIRATORY: normal respiratory rate and pattern with no distress; normal breath sounds with no rales, rhonchi, wheezes or rubs;     CARDIOVASCULAR: normal rate; rhythm is regular;  no systolic murmur; no edema;     GASTROINTESTINAL: nontender; normal bowel sounds; no masses;     MUSCULOSKELETAL: normal gait; muscle strength: 5/5 in all major muscle groups;  normal overall tone     NEUROLOGIC: mental status: alert and oriented x 3;     PSYCHIATRIC:  appropriate affect and demeanor; normal speech pattern; grossly normal memory;         ASSESSMENT           149.0   C14.0  Carcinoma of throat              DDx:     307.41   F51.01  Insomnia              DDx:     401.1   I10  Benign HTN              DDx:     564.01   K59.00  Constipation               DDx:         ORDERS:         Meds Prescribed:       Refill of: Trazodone HCl 50mg Tablet 1 - 2 @ QHS PRN  #45 (Forty Five) tablet(s) Refills: 2       Refill of: Metoprolol Succinate 100mg Tablets, Extended Release Take 1 tablet by mouth daily  #90 (Ninety) tablet(s) Refills: 2       Refill of: Amlodipine  5mg Tablet Take 1 tablet daily for Hypertension  #90 (Ninety) tablet(s) Refills: 2       Refill of: Lisinopril 40mg Tablet 1 tab daily  #90 (Ninety) tablet(s) Refills: 2       Senna 8.6mg Tablet 1 tab daily  #30 (Thirty) tablet(s) Refills: 2       Miralax (Polyethylene Glycol 3350) Powder for Oral Solution mix 1/2 capful in 8 oz of liquid per day  #255 (Two [place] and Fifty Five) gm Refills: 0         Lab Orders:       [id number]  [facility] PHYSICAL: CMP, CBC, TSH, LIPID: [id number], [id number], [id number], 04713  (Send-Out)         APPTO  Appointment need  (In-House)                   PLAN: For unexplained weight loss we are ordering basic labs and requesting records from oncology.          Carcinoma of throat f/u with Dr. Pantoja, records requested.          Insomnia Cont trazadone as this has worked well for him with less side effects than Ambien.           Prescriptions:       Refill of: Trazodone HCl 50mg Tablet 1 - 2 @ QHS PRN  #45 (Forty Five) tablet(s) Refills: 2          Benign HTN BP much better controlled, will cont metoprolol at same dosage but change to 100 mg qd instead of 50 mg BID. Cont lisinopril 40 and amlodipine 5 mg qd.     LABORATORY:  Labs ordered to be performed today include PHYSICAL PANEL; CMP, CBC, TSH, LIPID.      FOLLOW-UP: Schedule a follow-up visit in 1 month..            Prescriptions:       Refill of: Metoprolol Succinate 100mg Tablets, Extended Release Take 1 tablet by mouth daily  #90 (Ninety) tablet(s) Refills: 2       Refill of: Amlodipine  5mg Tablet Take 1 tablet daily for Hypertension  #90 (Ninety) tablet(s) Refills: 2       Refill of: Lisinopril 40mg Tablet 1 tab daily  #90 (Ninety)  tablet(s) Refills: 2           Orders:       [id number]  [facility] PHYSICAL: CMP, CBC, TSH, LIPID: [id number], [id number], [id number], [id number]  (Send-Out)         APPTO  Appointment need  (In-House)            Constipation Senna and miralax ordered for constipation, pt advised he can titrate these medicines to find the right dosage to alleviate constipation without inducing diarrhea.           Prescriptions:       Senna 8.6mg Tablet 1 tab daily  #30 (Thirty) tablet(s) Refills: 2       Miralax (Polyethylene Glycol 3350) Powder for Oral Solution mix 1/2 capful in 8 oz of liquid per day  #255 (Two [place] and Fifty Five) gm Refills: 0             Patient Recommendations:        For  Benign HTN:     Schedule a follow-up visit in 1 month.                APPOINTMENT INFORMATION:        Monday Tuesday Wednesday Thursday Friday Saturday Sunday            Time:___________________AM  PM   Date:_____________________             CHARGE CAPTURE           **Please note: ICD descriptions below are intended for billing purposes only and may not represent clinical diagnoses**        Primary Diagnosis:         149.0 Carcinoma of throat            C14.0    Malignant neoplasm of pharynx, unspecified              Orders:          [id number]   Office/outpatient visit; established patient, level 4  (In-House)           307.41 Insomnia            F51.01    Primary insomnia    401.1 Benign HTN            I10    Essential (primary) hypertension              Orders:          APPTO   Appointment need  (In-House)           564.01 Constipation            K59.00    Constipation, unspecified

## 2021-05-18 NOTE — PROGRESS NOTES
Dixon Amador 1945     Office/Outpatient Visit    Visit Date: Thu, May 24, 2018 12:58 pm    Provider: Tammy Rodriguez N.P. (Assistant: Becka Rouse MA)    Location: [facility]        Electronically signed by Tammy Rodriguez N.P. on  05/24/2018 05:52:14 PM                             SUBJECTIVE:        CC:     Mr. Amador is a 72 year old White male.  Sutures removed         HPI: Dixon presents for staple removal. Notes that he fell 7 days ago. He tripped and fell when getting up to go to the bathroom in the dark. Denies LOC. He was seen at [facility] ER. Reports that they did xray of his head and blood work and thinks that everything was okay. He has 7 staples in his forehead that he was instructed to have removed in 7 days.     ROS:     CONSTITUTIONAL:  Negative for chills and fever.      CARDIOVASCULAR:  Negative for chest pain and palpitations.      RESPIRATORY:  Negative for dyspnea and frequent wheezing.      INTEGUMENTARY:  Positive for staples in forehead.      NEUROLOGICAL:  Negative for dizziness and headaches.      ENDOCRINE:  Negative for polydipsia and polyphagia.      PSYCHIATRIC:  Negative for depression and suicidal thoughts.          PMH/FMH/SH:     Last Reviewed on 5/24/2018 01:19 PM by Tammy Rodriguez    Past Medical History:             PAST MEDICAL HISTORY         Hypertension     Pneumonia     Hypothyroidism: after radiation;     tonsil CA - chemo and radiation - 11 years ago - now cleared     surgery for busted blood vessel         CURRENT MEDICAL PROVIDERS:    Oncologist: Dr. Dr. Pantoja in [facility]             COLORECTAL CANCER SCREENING: Up to date (colonoscopy q10y; sigmoidoscopy q5y; Cologuard q3y) was last done patient thinks it in 2014; colonoscopy with normal results; Dr. Benitez         Surgical History:         Throat surgery for CA;    Back surgery X 2 in 1987;         Family History:     Father: Cause of death was MI     Mother: Healthy      Paternal Grandfather: Cause of death was MI     Maternal Grandfather: Cause of death was MI         Social History:     Occupation: Retired (Prior occupation: [employer])     Marital Status:  ( and remarried)     Children: 1 step-child         Tobacco/Alcohol/Supplements:     Last Reviewed on 5/24/2018 01:19 PM by Tammy Rodriguez    Tobacco: He has a past history of cigarette smoking; quit date:  1999.          Alcohol: Frequency: Socially         Substance Abuse History:     Last Reviewed on 5/24/2018 01:19 PM by Tammy Rodriguez    None         Mental Health History:     Last Reviewed on 5/24/2018 01:19 PM by Tammy Rodriguez    NEGATIVE         Communicable Diseases (eg STDs):     Last Reviewed on 5/24/2018 01:19 PM by Tammy Rodriguez    Reportable health conditions; NEGATIVE             Current Problems:     Last Reviewed on 5/24/2018 01:19 PM by Tammy Rodriguez    Encounter for removal of staples         Immunizations:     Fluzone (3 + years dose) 11/1/2017         Allergies:     Last Reviewed on 5/24/2018 01:19 PM by Tammy Rodriguez      No Known Drug Allergies.         Current Medications:     Last Reviewed on 5/24/2018 01:19 PM by Tammy Rodriguez    Levothyroxine Sodium 0.15mg Tablet Take 1 tablet(s) by mouth daily     Lisinopril 5mg Tablet Take 1 tablet(s) by mouth daily     Metoprolol Succinate 50mg Tablets, Extended Release Take 1 tablet(s) by mouth BID         OBJECTIVE:        Vitals:         Current: 5/24/2018 1:01:47 PM    Ht:  5 ft, 9 in;  Wt: 137.8 lbs;  BMI: 20.3    T: 97.8 F;  BP: 126/67 mm Hg (left arm, sitting);  P: 76 bpm (left arm (BP Cuff), sitting)        Exams:     PHYSICAL EXAM:     GENERAL: well developed, well nourished;  no apparent distress;     RESPIRATORY: normal respiratory rate and pattern with no distress; normal breath sounds with no rales, rhonchi, wheezes or rubs;     CARDIOVASCULAR: normal rate; rhythm is regular;     SKIN: staples intact to forehead. Well approximated, no s/s of  infection;     MUSCULOSKELETAL: normal gait;     NEUROLOGIC: mental status: alert and oriented x 3; GROSSLY INTACT     PSYCHIATRIC: appropriate affect and demeanor;         Procedures:     Encounter for removal of staples     Sutures were removed today with minimal difficulty. The area is healed without signs of infection.              ASSESSMENT           V58.32   Z48.02  Encounter for removal of staples              DDx:         PLAN:          Encounter for removal of staples         RECOMMENDATIONS given include: Keep clean and dry. Monitor for signs of infection..      FOLLOW-UP: Schedule follow-up appointments on a p.r.n. basis. Chronic visit follow up             Patient Recommendations:        For  Encounter for removal of staples:     Schedule follow-up appointments as needed.              CHARGE CAPTURE           **Please note: ICD descriptions below are intended for billing purposes only and may not represent clinical diagnoses**        Primary Diagnosis:         V58.32 Encounter for removal of staples            Z48.02    Encounter for removal of sutures              Orders:          93444   Office visit - new pt, level 2  (In-House)

## 2021-05-18 NOTE — PROGRESS NOTES
Dixon Amador 1945     Office/Outpatient Visit    Visit Date: Fri, Oct 18, 2019 02:01 pm    Provider: Ilir Mireles MD (Assistant: Shayy Wu RN)    Location: [facility]        Electronically signed by Ilir Mireles MD on  10/27/2019 04:19:21 PM                             SUBJECTIVE:        CC:     Long is a 73 year old White male.  2 week follow up         HPI:     BP today is 127/65 with a HR of 59. He is on lisinopril 40 mg qd, metoprolol 100 mg qd, HCTZ 12.5 mg qd, and amlodipine 5 mg qd.     Arthritis profile is positive for rheumatoid factor. ESR and CRP are normal. Pt was advised this is c/w rheumatoid arthritis as he has polyarthralgia and joint stiffness in the morning and gets better throughout the day.     He has some left shoulder pain for a year, comes and goes. Pain is distal shoulder and down the arm. Pain is worse with abduction. He can forward flexion and extension are OK.     ROS:     CONSTITUTIONAL:  Negative for fatigue and fever.      E/N/T:  Negative for diminished hearing and nasal congestion.      CARDIOVASCULAR:  Negative for chest pain, orthopnea and palpitations.      RESPIRATORY:  Positive for dyspnea ( with moderate exertion ).   Negative for recent cough.      GASTROINTESTINAL:  Positive for constipation ( resolved ).   Negative for abdominal pain, diarrhea, nausea or vomiting.      MUSCULOSKELETAL:  Positive for arthralgias and (left shoulder) back pain.   Negative for myalgias.      NEUROLOGICAL:  Negative for headaches and weakness.      PSYCHIATRIC:  Positive for insomnia.   Negative for anxiety or depression.          PMH/FMH/SH:     Last Reviewed on 10/27/2019 04:14 PM by Ilir Mireles    Past Medical History:             PAST MEDICAL HISTORY         Hypertension     Pneumonia     Hypothyroidism: after radiation;     tonsil CA - chemo and radiation - 11 years ago - now cleared     surgery for busted blood vessel         CURRENT MEDICAL PROVIDERS:     Oncologist: Dr. Dr. Pantoja in [facility]             COLORECTAL CANCER SCREENING: Up to date (colonoscopy q10y; sigmoidoscopy q5y; Cologuard q3y) was last done patient thinks it in 2014; colonoscopy with normal results; Dr. Benitez         Surgical History:         throat cancer resection in 2008;    Back surgery X 2 in 1987;         Family History:     Father: Cause of death was MI     Mother: Healthy     Paternal Grandfather: Cause of death was MI     Maternal Grandfather: Cause of death was MI         Social History:     Occupation: Retired (Prior occupation: [employer])     Marital Status:  ( and remarried)     Children: 1 step-child         Tobacco/Alcohol/Supplements:     Last Reviewed on 10/27/2019 04:14 PM by Ilir Mireles    Tobacco: He has a past history of cigarette smoking; quit date:  1999.          Alcohol: Frequency: Socially         Substance Abuse History:     Last Reviewed on 10/27/2019 04:14 PM by Ilir Mireles    None         Mental Health History:     Last Reviewed on 10/27/2019 04:14 PM by Ilir Mireles    NEGATIVE         Communicable Diseases (eg STDs):     Last Reviewed on 10/27/2019 04:14 PM by Ilir Mireles    Reportable health conditions; NEGATIVE             Current Problems:     Last Reviewed on 10/27/2019 04:14 PM by Ilir Mireles    Rheumatoid arthritis     Constipation     Acquired hypothyroidism, other iatrogenic cause     Osteopenia     Patient visit for long term (current) drug use; other     Benign HTN     Syncope     Polycythemia vera     Acquired hypothyroidism, other specified cause     Chronic low back pain     Carcinoma of throat     Hypertensive urgency     Rotator cuff tear     Shortness of breath     Insomnia         Immunizations:     Fluzone (3 + years dose) 11/1/2017     Fluad pf 9/4/2019     Influenza Virus Vaccine, unspecified formulation 11/1/2018     PNEUMOVAX 23 (Pneumococcal PPV23) 3/28/2019          Allergies:     Last Reviewed on 10/27/2019 04:14 PM by Ilir Mireles      No Known Drug Allergies.     Atorvastatin Calcium: Liver function abnormalities (Adverse Reaction)        Current Medications:     Last Reviewed on 10/27/2019 04:14 PM by Ilir Mireles    Levothyroxine Sodium 0.137mg Tablet Take 1 tablet daily by mouth     Hydrochlorothiazide (HCTZ) 12.5mg Tablet Take 1 tablet(s) by mouth daily     Trazodone HCl 50mg Tablet 1 - 2 @ [facility] PRN     Melatonin     Vitamin B12 500mcg Tablet 1 tab daily         OBJECTIVE:        Vitals:         Current: 10/18/2019 2:08:41 PM    Ht:  5 ft, 9 in;  Wt: 132.8 lbs;  BMI: 19.6    T: 97.8 F (oral);  BP: 127/65 mm Hg (left arm, sitting);  P: 59 bpm (left arm (BP Cuff), sitting);  sCr: 0.98 mg/dL;  GFR: 56.92        Exams:     PHYSICAL EXAM:     GENERAL: Vitals recorded well developed, well nourished, thin;  well groomed;  no apparent distress;     E/N/T: OROPHARYNX:  normal mucosa, dentition, gingiva, and posterior pharynx;     RESPIRATORY: normal respiratory rate and pattern with no distress; normal breath sounds with no rales, rhonchi, wheezes or rubs;     CARDIOVASCULAR: normal rate; rhythm is regular;  no systolic murmur; no edema;     GASTROINTESTINAL: nontender; normal bowel sounds; no masses;     MUSCULOSKELETAL: normal gait; pain with range of motion in: left shoulder abduction;  muscle strength: 5/5 in all major muscle groups;  normal overall tone swelling PIPs bilaterally;     NEUROLOGIC: mental status: alert and oriented x 3;     PSYCHIATRIC:  appropriate affect and demeanor; normal speech pattern; grossly normal memory;         Procedures:     Rotator cuff tear    Procedure Note:  Arthrocentesis/Injection     Arthrocentesis of left shoulder joint is performed.  Written informed consent was obtained.  The site is prepped with alcohol and betadine.  The joint is injected with 2 cc of 2% lidocaine and Kenalog 80 mg.  No complications.  Estimated blood loss: 0  cc.              ASSESSMENT           401.1   I10  Benign HTN              DDx:     714.0   M06.041   M06.042  Rheumatoid arthritis              DDx:     840.4   M75.102  Rotator cuff tear              DDx:         ORDERS:         Lab Orders:       APPTO  Appointment need  (In-House)           Procedures Ordered:       20610  Arthrocentesis, aspiration and/or injection; major joint or bursa  (In-House)           Other Orders:         Kenalog, per 10 mg (x8)                 PLAN:          Benign HTN BP remains well controlled. Cont lisinopril 40 mg qd, metoprolol 100 mg qd, HCTZ 12.5 mg qd, and amlodipine 5 mg qd.          Rheumatoid arthritisWe discussed possible treatment options for rheumatoid arthritis and pt declines at this time. We also discussed referral to rheumatology but he also declines at this time and will think about it.         FOLLOW-UP: Schedule a follow-up visit in 2 months.:.            Orders:       APPTO  Appointment need  (In-House)            Rotator cuff tearPt given steroid shot in left shoulder for what appears to be left rotator cuff tear.           Orders:       20610  Arthrocentesis, aspiration and/or injection; major joint or bursa  (In-House)                     Kenalog, per 10 mg (x8)             Patient Recommendations:        Rheumatoid arthritis    Schedule a follow-up visit in 2 months.                APPOINTMENT INFORMATION:        Monday Tuesday Wednesday Thursday Friday Saturday Sunday            Time:___________________AM  PM   Date:_____________________             CHARGE CAPTURE           **Please note: ICD descriptions below are intended for billing purposes only and may not represent clinical diagnoses**        Primary Diagnosis:         401.1 Benign HTN            I10    Essential (primary) hypertension              Orders:          50675   Office/outpatient visit; established patient, level 4  (In-House)           714.0 Rheumatoid arthritis             M06.041    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, right hand           M06.042    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, left hand              Orders:          APPTO   Appointment need  (In-House)           840.4 Rotator cuff tear            M75.102    Unspecified rotator cuff tear or rupture of left shoulder, not specified as traumatic              Orders:          00418   Arthrocentesis, aspiration and/or injection; major joint or bursa  (In-House)                                           Kenalog, per 10 mg (x8)

## 2021-05-18 NOTE — PROGRESS NOTES
Dixon Amador  1945     Office/Outpatient Visit    Visit Date: Tue, Feb 23, 2021 02:15 pm    Provider: Shayy Galaviz N.P. (Assistant: Corine Small MA)    Location: [facility]        Electronically signed by Shayy Galaviz N.P. on  02/26/2021 04:44:13 PM                             Subjective:        CC: Long is a 75 year old White male.  This is a follow-up visit.          HPI:           Long presents with essential (primary) hypertension.  His current cardiac medication regimen includes a diuretic, a beta-blocker, a calcium channel blocker, and an angiotensin receptor blocker.  Compliance with treatment has been good.  He is tolerating the medication well without side effects.  He did not bring his blood pressure diary, but says that typical readings show systolics in the 130s and diastolics in the 70s.  He is here to clarify his medications  dosing and frequency.  He had gotten confused at some point and was taking 50mg of metoprolol succinate BID  was having some dizziness off and on  - but thinks he got this medication confused with his losartan, which should have been increased.  He is now taking medication appropriately, and states that his blood pressures have been better.            Complaint of rheumatoid arthritis 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, right hand..  Mr. Anderson reports that his arthritis is 'ok'  The tramadol is not completely effective, but is allowing him to continue to function.  he was previously put on Norco, but was told this was short term and has since been swithed to Tramadol.  He declines referral to Rehumatology for his RA as with his previous cancer diagnosis, he does not wish to go on a biologic that may reduce his immune system even further.  He feels that his is doing well at the current time     ROS:     CONSTITUTIONAL:  Negative for chills, fatigue and fever.      CARDIOVASCULAR:  Negative for chest pain and pedal edema.      RESPIRATORY:  Negative for  recent cough and dyspnea.      MUSCULOSKELETAL:  Positive for joint stiffness (diffuse  but mostly in hands).   Negative for arthralgias or myalgias.          Past Medical History / Family History / Social History:         Last Reviewed on 2/26/2021 04:43 PM by Shayy Galaviz    Past Medical History:             PAST MEDICAL HISTORY         Hypertension     Pneumonia     Hypothyroidism: after radiation;     tonsil CA - chemo and radiation - 11 years ago - now cleared     surgery for busted blood vessel         CURRENT MEDICAL PROVIDERS:    Oncologist: Dr. Dr. Pantoja in [facility]             COLORECTAL CANCER SCREENING: Up to date (colonoscopy q10y; sigmoidoscopy q5y; Cologuard q3y) was last done patient thinks it in 2014; colonoscopy with normal results; Dr. Benitez         Surgical History:         throat cancer resection in 2008;    Back surgery X 2 in 1987;         Family History:     Father: Cause of death was MI     Mother: Healthy     Paternal Grandfather: Cause of death was MI     Maternal Grandfather: Cause of death was MI         Social History:     Occupation: Retired (Prior occupation: [employer])     Marital Status:  ( and remarried)     Children: 1 step-child         Tobacco/Alcohol/Supplements:     Last Reviewed on 2/23/2021 02:21 PM by Corine Small    Tobacco: He has a past history of cigarette smoking; quit date:  1999.          Alcohol: Frequency: Socially         Substance Abuse History:     Last Reviewed on 1/15/2021 05:46 PM by Shayy Galaviz    None         Mental Health History:     Last Reviewed on 1/15/2021 05:46 PM by Shayy Galaviz    NEGATIVE         Communicable Diseases (eg STDs):     Last Reviewed on 1/15/2021 05:46 PM by Shayy Galaviz    Reportable health conditions; NEGATIVE         Current Problems:     Last Reviewed on 2/26/2021 04:43 PM by Shayy Galaviz    Malignant neoplasm of pharynx, unspecified    Polycythemia  vera    Hypothyroidism, unspecified    Other specified anxiety disorders    Syncope and collapse    Radiculopathy, lumbar region    Essential (primary) hypertension    Primary insomnia    Other specified disorders of bone, multiple sites    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    Constipation, unspecified    Shortness of breath    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, right hand    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, left hand    Encounter for screening for malignant neoplasm of prostate    Cough    Other long term (current) drug therapy    Encounter for screening for depression    Encounter for immunization    Encounter for general adult medical examination without abnormal findings        Immunizations:     influenza, high-dose, quadrivalent 9/22/2020    Pneumococcal conjugate PCV 13 (PREVNAR 13) 1/11/2021    Fluzone (3 + years dose) 11/1/2017    Fluad pf 9/4/2019    Influenza Virus Vaccine, unspecified formulation 11/1/2018    PNEUMOVAX 23 (Pneumococcal PPV23) 3/28/2019        Allergies:     Last Reviewed on 2/23/2021 02:21 PM by Corine Small    Atorvastatin Calcium: Liver function abnormalities  (Adverse Reaction)    LISINOPRIL 40MG TABLETS: Dry cough  (Adverse Reaction)    codeine:          Current Medications:     Last Reviewed on 2/23/2021 02:21 PM by Corine Small    levothyroxine 125 mcg oral tablet [take one tab every other day ]    metoprolol succinate 50 mg oral Tablet, Extended Release 24 hr [take 1 tablet (50 mg) by oral route once daily]    levothyroxine 137 mcg oral tablet [TAKE 1 TABLET BY MOUTH DAILY ON AN EMPTY STOMACH]    Melatonin     traZODone 50 mg oral tablet [TAKE 1 TO 2 TABLETS BY MOUTH EVERY NIGHT AT BEDTIME AS NEEDED]    hydroCHLOROthiazide 25 mg oral tablet [TAKE 1 TABLET(25 MG) BY MOUTH EVERY DAY]    Vitamin B-12 500 mcg oral tablet [1 tab daily]    amLODIPine 10 mg oral tablet [TK 1 T PO QD]    losartan 100 mg oral tablet [take 1  tablet (100 mg) by oral route once daily]    traMADol 50 mg oral tablet [take 1 tablet (50 mg) by oral route every 12 hrs PRN pain -Take sparingly]        Objective:        Vitals:         Historical:     1/11/2021  BP:   148/70 mm Hg ( (left arm, , sitting, );) 11/11/2020  BP:   127/65 mm Hg ( (left arm, , sitting, );)     Current: 2/23/2021 2:23:09 PM    Ht:  5 ft, 9 in;  Wt: 172.6 lbs;  BMI: 25.5T: 97.3 F (temporal);  BP: 147/60 mm Hg (left arm, sitting);  P: 75 bpm (left arm (BP Cuff), sitting);  sCr: 1.32 mg/dL;  GFR: 45.88        Exams:     PHYSICAL EXAM:     GENERAL: Vitals recorded well developed, well nourished;  no apparent distress;     NECK: range of motion is normal;     RESPIRATORY: normal appearance and symmetric expansion of chest wall; normal respiratory rate and pattern with no distress; normal breath sounds with no rales, rhonchi, wheezes or rubs;     CARDIOVASCULAR: normal rate; rhythm is regular;  no edema;     LYMPHATIC: no enlargement of cervical or facial nodes; no supraclavicular nodes;     MUSCULOSKELETAL: normal gait; normal range of motion of all major muscle groups; pain with range of motion in:; ;     NEUROLOGIC: mental status: alert and oriented x 3;     PSYCHIATRIC: appropriate affect and demeanor; normal speech pattern; normal thought and perception;         Assessment:         I10   Essential (primary) hypertension       M06.041   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, right hand       M06.042   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, left hand           ORDERS:         Meds Prescribed:       [Refilled] amLODIPine 10 mg oral tablet [TK 1 T PO QD], #90 (ninety) tablets, Refills: 0 (zero)       [Refilled] hydroCHLOROthiazide 25 mg oral tablet [TAKE 1 TABLET(25 MG) BY MOUTH EVERY DAY], #90 (ninety) tablets, Refills: 0 (zero)       [Refilled] metoprolol succinate 50 mg oral Tablet, Extended Release 24 hr [take 1 tablet (50 mg) by oral route once daily], #90 (ninety)  tablets, Refills: 0 (zero)       [Refilled] losartan 100 mg oral tablet [take 1 tablet (100 mg) by oral route once daily], #90 (ninety) tablets, Refills: 0 (zero)                 Plan:         Essential (primary) hypertension          Prescriptions:       [Refilled] amLODIPine 10 mg oral tablet [TK 1 T PO QD], #90 (ninety) tablets, Refills: 0 (zero)       [Refilled] hydroCHLOROthiazide 25 mg oral tablet [TAKE 1 TABLET(25 MG) BY MOUTH EVERY DAY], #90 (ninety) tablets, Refills: 0 (zero)       [Refilled] metoprolol succinate 50 mg oral Tablet, Extended Release 24 hr [take 1 tablet (50 mg) by oral route once daily], #90 (ninety) tablets, Refills: 0 (zero)       [Refilled] losartan 100 mg oral tablet [take 1 tablet (100 mg) by oral route once daily], #90 (ninety) tablets, Refills: 0 (zero)         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, right handfor now, the tramadol will suffice.  I did discuss that he may be seen by rheumatology - he declines as they will not be able t provide additional treatment that he will be willing to take         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, left handas above             Charge Capture:         Primary Diagnosis:     I10  Essential (primary) hypertension           Orders:      10921  Office/outpatient visit; established patient, level 3  (In-House)              M06.041  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, right hand     M06.042  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, left hand

## 2021-05-18 NOTE — PROGRESS NOTES
Dixon Amador  1945     Office/Outpatient Visit    Visit Date: Mon, Jan 11, 2021 01:54 pm    Provider: Shayy Galaviz N.P. (Assistant: Corine Small MA)    Location: [facility]        Electronically signed by Shayy Galaviz N.P. on  01/15/2021 06:02:15 PM                             Subjective:        CC: Long is a 75 year old White male.  mcw pt does not have bottle of norco, lisinopril, hydroxychloroquine        HPI:           Long is here for a Medicare wellness visit.          Self-Assessment of Health: He rates his health as fair. He rates his confidence of being able to control/manage most of his health problems as somewhat confident. His physical/emotional health has limited his social activites moderately.  A review of cognitive impairment was performed, including ability to drive a car, manage finances, and any memory changes, and was found to be negative.  A review of functional ability, including bathing, dressing, walking, and urine/bowel continence as well as level of safety was performed and was found to be negative.  Falls Risk: Has not had any falls or only one fall without injury in the past year.  In regard to hearing, he reports having trouble hearing the TV/radio when others do not, but not having to strain to hear or understand conversations or wearing hearing aid(s).  Concerning home safety, he reports that at home he DOES have adequate lighting, a skid resistant shower/tub, grab bars in the bath and functioning smoke alarms, but not handrails on stairs or absence of throw rugs.  Physical Activity: He exercises but less than 20 minutes 3 days per week; Type of diet patient normally eats is described as well-balanced with fruits and vegetables Tobacco: He has a past history of cigarette smoking; quit date:  1999.  Preventative Health updated today.            Complaint of malignant neoplasm of pharynx, unspecified..  11 years ago  sees Dr. Pantoja in [place]  every 3 months           Complaint of polycythemia vera..  no longer needing phlebotmy  followed by Dr. Kit Medrano           Dx with hypothyroidism, unspecified; this was first diagnosed several years ago.  He is currently taking alternates days of 125/137 mcg.  TSH was last checked two months ago.  The result was reported as high.            Concerning radiculopathy, lumbar region, reason for visit: Other details: had fusion surgery in lower back  and currently had arthritis.            Dx with essential (primary) hypertension; this was first diagnosed several years ago.  His current cardiac medication regimen includes a diuretic, a calcium channel blocker, and an angiotensin receptor blocker.  Compliance with treatment has been good.  Review of his blood pressure log reveals systolics in the 140s and diastolics in the 80s.            Complaint of other specified disorders of bone, multiple sites..  reports that he is having flare up of possibly his RA  He was put on methotrexate in the past, but was unable to take.  Hydroxychloroquin was ineffective.  Was put on Pierron by PCP and is wanting refills on this.  He has never been seen by rheumatology           Primary insomnia details; this has been noted for the past several years.  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  was taking hydroxycholorquin but did not help and caused too much consitipation   Was also previously on Norco but has since stopped taking  Never seen by rheumatology  But has been taking ASA  325 - takes up to 7 total per day     ROS:     CONSTITUTIONAL:  Negative for chills, fatigue and fever.      CARDIOVASCULAR:  Negative for chest pain and pedal edema.      RESPIRATORY:  Negative for recent cough and dyspnea.      GASTROINTESTINAL:  Negative for abdominal pain, constipation, diarrhea, heartburn, nausea and vomiting.      GENITOURINARY:  Negative  for dysuria and change in urine stream.      MUSCULOSKELETAL:  Positive for arthralgias and (diffuse) limb pain ( right upper extremity pain ).   Negative for myalgias.      INTEGUMENTARY:  Negative for pruritis and rash.      NEUROLOGICAL:  Negative for dizziness, fainting, headaches and paresthesias.      ENDOCRINE:  Negative for hair loss, polydipsia and polyphagia.      ALLERGIC/IMMUNOLOGIC:  Negative for seasonal allergies.      PSYCHIATRIC:  Negative for anxiety, depression and suicidal thoughts.          Past Medical History / Family History / Social History:         Last Reviewed on 1/15/2021 05:46 PM by Shayy Galaviz    Past Medical History:             PAST MEDICAL HISTORY         Hypertension     Pneumonia     Hypothyroidism: after radiation;     tonsil CA - chemo and radiation - 11 years ago - now cleared     surgery for busted blood vessel         CURRENT MEDICAL PROVIDERS:    Oncologist: Dr. Dr. Pantoja in [facility]             COLORECTAL CANCER SCREENING: Up to date (colonoscopy q10y; sigmoidoscopy q5y; Cologuard q3y) was last done patient thinks it in 2014; colonoscopy with normal results; Dr. Benitez         Surgical History:         throat cancer resection in 2008;    Back surgery X 2 in 1987;         Family History:     Father: Cause of death was MI     Mother: Healthy     Paternal Grandfather: Cause of death was MI     Maternal Grandfather: Cause of death was MI         Social History:     Occupation: Retired (Prior occupation: [employer])     Marital Status:  ( and remarried)     Children: 1 step-child         Tobacco/Alcohol/Supplements:     Last Reviewed on 1/15/2021 05:46 PM by Shayy Galaviz    Tobacco: He has a past history of cigarette smoking; quit date:  1999.          Alcohol: Frequency: Socially         Substance Abuse History:     Last Reviewed on 1/15/2021 05:46 PM by Shayy Galaviz    None         Mental Health History:      Last Reviewed on 1/15/2021 05:46 PM by Shayy Galaviz    NEGATIVE         Communicable Diseases (eg STDs):     Last Reviewed on 1/15/2021 05:46 PM by Shayy Galaviz    Reportable health conditions; NEGATIVE         Current Problems:     Last Reviewed on 1/15/2021 05:46 PM by Shayy Galaviz    Malignant neoplasm of pharynx, unspecified    Polycythemia vera    Hypothyroidism, unspecified    Other specified anxiety disorders    Syncope and collapse    Radiculopathy, lumbar region    Essential (primary) hypertension    Primary insomnia    Other specified disorders of bone, multiple sites    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    Constipation, unspecified    Shortness of breath    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, right hand    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, left hand    Encounter for screening for malignant neoplasm of prostate    Cough    Other long term (current) drug therapy    Encounter for screening for depression    Encounter for immunization    Encounter for general adult medical examination without abnormal findings        Immunizations:     influenza, high-dose, quadrivalent 9/22/2020    Fluzone (3 + years dose) 11/1/2017    Fluad pf 9/4/2019    Influenza Virus Vaccine, unspecified formulation 11/1/2018    PNEUMOVAX 23 (Pneumococcal PPV23) 3/28/2019        Allergies:     Last Reviewed on 1/15/2021 05:46 PM by Shayy Galaviz    Atorvastatin Calcium: Liver function abnormalities  (Adverse Reaction)    LISINOPRIL 40MG TABLETS: Dry cough  (Adverse Reaction)        Current Medications:     Last Reviewed on 1/15/2021 05:46 PM by Shayy Galaviz    levothyroxine 137 mcg oral tablet [TAKE 1 TABLET BY MOUTH DAILY ON AN EMPTY STOMACH]    metoprolol succinate 50 mg oral Tablet, Extended Release 24 hr [take 1 tablet (50 mg) by oral route once daily]    Melatonin     hydroCHLOROthiazide 25 mg oral tablet [TAKE 1 TABLET(25 MG) BY MOUTH EVERY DAY]     Vitamin B-12 500 mcg oral tablet [1 tab daily]    amLODIPine 10 mg oral tablet [TK 1 T PO QD]    losartan 100 mg oral tablet [take 1 tablet (100 mg) by oral route once daily]    levothyroxine 125 mcg oral tablet [take one tab every other day ]    traZODone 50 mg oral tablet [TAKE 1 TO 2 TABLETS BY MOUTH EVERY NIGHT AT BEDTIME AS NEEDED]        Objective:        Vitals:         Current: 1/11/2021 2:04:42 PM    Ht:  5 ft, 9 in;  Wt: 163.8 lbs;  BMI: 24.2T: 97.6 F (temporal);  BP: 148/70 mm Hg (left arm, sitting);  P: 60 bpm (left arm (BP Cuff), sitting);  sCr: 1.41 mg/dL;  GFR: 42.01        Exams:     PHYSICAL EXAM:     GENERAL: Vitals recorded well developed, well nourished;  no apparent distress;     NECK: range of motion is normal;     RESPIRATORY: normal appearance and symmetric expansion of chest wall; normal respiratory rate and pattern with no distress; normal breath sounds with no rales, rhonchi, wheezes or rubs;     CARDIOVASCULAR: normal rate; rhythm is regular;  no edema;     GASTROINTESTINAL: nontender; normal bowel sounds; no organomegaly;     MUSCULOSKELETAL: normal gait; normal range of motion of all major muscle groups; pain with range of motion in: right elbow forearm supination;  right wrist; hand / wrist;     NEUROLOGIC: mental status: alert and oriented x 3;     PSYCHIATRIC: appropriate affect and demeanor; normal speech pattern; normal thought and perception;         Procedures:     Encounter for immunization    1. Patient experienced no reaction.              Assessment:         Z00.00   Encounter for general adult medical examination without abnormal findings       Z13.31   Encounter for screening for depression       C14.0   Malignant neoplasm of pharynx, unspecified       D45   Polycythemia vera       E03.9   Hypothyroidism, unspecified       F41.8   Other specified anxiety disorders       M54.16   Radiculopathy, lumbar region       I10   Essential (primary) hypertension       M89.8X0   Other  specified disorders of bone, multiple sites       F51.01   Primary insomnia       M06.041   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, right hand       Z23   Encounter for immunization           ORDERS:         Meds Prescribed:       [New Rx] traMADol 50 mg oral tablet [take 1 tablet (50 mg) by oral route every 12 hrs PRN pain -Take sparingly], #30 (thirty) tablets, Refills: 0 (zero)         Lab Orders:       74756  TSH - [facility] TSH  (Send-Out)            91011  BMP - [facility] Basic Metabolic Panel  (Send-Out)            11920  BDCB2 - [facility] CBC w/o diff  (Send-Out)              Procedures Ordered:       [id number]  Pneumococcal conjugate vaccine, 13 valent, for intramuscular use  (In-House)            REFER  Referral to Specialist or Other Facility  (Send-Out)              Annual wellness visit, includes a PPPS, subsequent visit  (In-House)              Other Orders:       1101F  Pt screen for fall risk; document no falls in past year or only 1 fall w/o injury in past year (MEERA)  (In-House)              Depression screen negative  (In-House)              Administration of pneumococcal vaccine  (x1)                  Plan:         Encounter for general adult medical examination without abnormal findings    MIPS Has had no falls or only one fall without injury in the past year Vaccines Flu and Pneumonia updated in Shot record ADVANCE DIRECTIVES (Please update in [facility]): Living will on file           Orders:       1101F  Pt screen for fall risk; document no falls in past year or only 1 fall w/o injury in past year (MEERA)  (In-House)              Annual wellness visit, includes a PPPS, subsequent visit  (In-House)              Encounter for screening for depression    MIPS Positive Depression Screen but after further evaluation the patient does not have a diagnosis of depression.            Orders:         Depression screen negative  (In-House)              Malignant neoplasm of pharynx, unspecifiedcontinue  follow up with Dr. Pantoja as recommended every 3 months        Polycythemia veracontinue follow up with Dr. Pantoja as recommended         Hypothyroidism, unspecifiedWill check labs today  and continue current dose of Levothyroxine alternating doses    LABORATORY:  Labs ordered to be performed today include TSH.            Orders:       [id number]  TSH - [facility] TSH  (Send-Out)              Other specified anxiety disordersContinue follow up PRN - no current concerns        Radiculopathy, lumbar regionfollow up PRN          Prescriptions:       [New Rx] traMADol 50 mg oral tablet [take 1 tablet (50 mg) by oral route every 12 hrs PRN pain -Take sparingly], #30 (thirty) tablets, Refills: 0 (zero)         Essential (primary) hypertensionContinue treatment with Losartan, HCTZ, amlodipine - follow up in 3 months        Other specified disorders of bone, multiple sitesMr. Marjorie is requesting refill on norco.  I have recommended that we get him in with rheumatology for management.  his levels are elevated and will need recommendations.  I will defer from Rx Norco - I have encouraged him to avoid overuse of ASA.  I will send him alternative to take sparingly and will need to have rheumatology manage in the long term    LABORATORY:  Labs ordered to be performed today include basic metabolic panel and CBC W/O DIFF.            Orders:       [id number]  [facility] Basic Metabolic Panel  (Send-Out)            [address]  [address] CBC w/o diff  (Send-Out)              Primary insomniaContinue trazodone and follow up PRN        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, right handreferral to Rheumatology        REFERRALS:  Referral initiated to a rheumatologist.            Orders:       REFER  Referral to Specialist or Other Facility  (Send-Out)              Encounter for immunization        IMMUNIZATIONS given today: Prevnar 13.            Immunizations:       99737  Pneumococcal conjugate vaccine, 13 valent, for intramuscular use  (In-House)                 Dose (ml): 0.5  Site: left deltoid  Route: intramuscular  Administered by: Trinidad Granados          : [employer]  Lot #: [id number]  Exp: 08/30/2022          NDC: [address]-10        Administration of pneumococcal vaccine  (x1)              Charge Capture:         Primary Diagnosis:     Z00.00  Encounter for general adult medical examination without abnormal findings           Orders:      1101F  Pt screen for fall risk; document no falls in past year or only 1 fall w/o injury in past year (MEERA)  (In-House)              Annual wellness visit, includes a PPPS, subsequent visit  (In-House)              Z13.31  Encounter for screening for depression           Orders:        Depression screen negative  (In-House)              C14.0  Malignant neoplasm of pharynx, unspecified     D45  Polycythemia vera     E03.9  Hypothyroidism, unspecified     F41.8  Other specified anxiety disorders     M54.16  Radiculopathy, lumbar region     I10  Essential (primary) hypertension     M89.8X0  Other specified disorders of bone, multiple sites     F51.01  Primary insomnia     M06.041  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, right hand     Z23  Encounter for immunization           Orders:      55800  Pneumococcal conjugate vaccine, 13 valent, for intramuscular use  (In-House)              Administration of pneumococcal vaccine  (x1)

## 2021-05-18 NOTE — PROGRESS NOTES
Dixon Amador 1945     Office/Outpatient Visit    Visit Date: Wed, Feb 27, 2019 09:27 am    Provider: Ilir Mireles MD (Assistant: Trinidad Granados MA)    Location: [facility]        Electronically signed by Ilri Mireles MD on  02/27/2019 05:21:59 PM                             SUBJECTIVE:        CC:     Mr. Amador is a 73 year old White male.  back pain; pt states bp has been running high         HPI:     BP today is 208/96 with HR of 66. He is on lisinopril 10 mg qd and metoprolol 50 mg BID. Pt feels good other than back pain. No headache or blurry vision. Pt does have black outs that he says is a side effect of his radiation. He gets these about once a month, they always happen when he's walking or when he's standing. He'll pass out in a split second but then come right. He has fallen because of this. He still drives.     Dx'd with throat cancer 11 years ago. He is s/p surgical resection, radiation, and chemo with Dr. Merritt. Pt sees Dr. Pantoja every 1-3 months for monitoring.     Pt has chronic low back pain. He was referred to pain management by Dr. Pantoja but he says they won't take him until he gets an MRI. Pain clinic is near [place]. Has never been to physical therapy.     Pt is s/p radiation of the neck for throat cancer and is hypothyroid because of this.     As above, pt sees Dr. Pantoja and gets therapeutic phlebotomy periodically.     As above, pt has brief episodes of syncope about once a month for the last 11 years, ever since his throat cancer treatment. He reports they always happen when he's standing or walking, never when he's sitting so he's not concerned about this happening when driving. They are not any worse now than in the past.     ROS:     CONSTITUTIONAL:  Negative for fatigue and fever.      EYES:  Negative for blurred vision.      E/N/T:  Negative for diminished hearing and nasal congestion.      CARDIOVASCULAR:  Negative for chest pain and palpitations.       RESPIRATORY:  Negative for recent cough and dyspnea.      GASTROINTESTINAL:  Negative for abdominal pain, constipation, diarrhea, nausea and vomiting.      MUSCULOSKELETAL:  Positive for back pain.   Negative for arthralgias or myalgias.      NEUROLOGICAL:  Positive for brief syncope.   Negative for headaches, paresthesias or weakness.      PSYCHIATRIC:  Negative for anxiety, depression and sleep disturbance.          PMH/FMH/SH:     Last Reviewed on 2/27/2019 05:20 PM by Ilir Mireles    Past Medical History:             PAST MEDICAL HISTORY         Hypertension     Pneumonia     Hypothyroidism: after radiation;     tonsil CA - chemo and radiation - 11 years ago - now cleared     surgery for busted blood vessel         CURRENT MEDICAL PROVIDERS:    Oncologist: Dr. Dr. Pantoja in [facility]             COLORECTAL CANCER SCREENING: Up to date (colonoscopy q10y; sigmoidoscopy q5y; Cologuard q3y) was last done patient thinks it in 2014; colonoscopy with normal results; Dr. Benitez         Surgical History:         Throat surgery for CA;    Back surgery X 2 in 1987;         Family History:     Father: Cause of death was MI     Mother: Healthy     Paternal Grandfather: Cause of death was MI     Maternal Grandfather: Cause of death was MI         Social History:     Occupation: Retired (Prior occupation: [employer])     Marital Status:  ( and remarried)     Children: 1 step-child         Tobacco/Alcohol/Supplements:     Last Reviewed on 2/27/2019 05:20 PM by Ilir Mireles    Tobacco: He has a past history of cigarette smoking; quit date:  1999.          Alcohol: Frequency: Socially         Substance Abuse History:     Last Reviewed on 2/27/2019 05:20 PM by Ilir Mireles    None         Mental Health History:     Last Reviewed on 2/27/2019 05:20 PM by Ilir Mireles    NEGATIVE         Communicable Diseases (eg STDs):     Last Reviewed on 2/27/2019 05:20 PM by  Ilir Mireles    Reportable health conditions; NEGATIVE             Current Problems:     Last Reviewed on 2/27/2019 05:20 PM by Ilir Mireles    Syncope     Polycythemia vera     Acquired hypothyroidism, other specified cause     Chronic low back pain     Carcinoma of throat     Hypertensive urgency         Immunizations:     Fluzone (3 + years dose) 11/1/2017     Influenza Virus Vaccine, unspecified formulation 11/1/2018         Allergies:     Last Reviewed on 2/27/2019 05:20 PM by Ilir Mireles      No Known Drug Allergies.     Atorvastatin Calcium: Liver function abnormalities (Adverse Reaction)        Current Medications:     Last Reviewed on 2/27/2019 05:20 PM by Ilir Mireles    Levothyroxine Sodium 0.15mg Tablet Take 1 tablet(s) by mouth daily     Lisinopril 10mg Tablet 1 tab daily     Metoprolol Succinate 50mg Tablets, Extended Release Take 1 tablet(s) by mouth BID         OBJECTIVE:        Vitals:         Current: 2/27/2019 9:36:31 AM    Ht:  5 ft, 9 in;  Wt: 137.4 lbs;  BMI: 20.3    T: 98.2 F (oral);  BP: 208/96 mm Hg (right arm, sitting);  P: 66 bpm (right arm (BP Cuff), sitting)        Repeat:     9:43:00 AM     BP:   194/96mm Hg (right arm, sitting)         Exams:     PHYSICAL EXAM:     GENERAL: Vitals recorded well developed, well nourished, thin;  well groomed;  no apparent distress;     EYES: extraocular movements intact; conjunctiva and cornea are normal; PERRLA;     E/N/T: OROPHARYNX:  normal mucosa, dentition, gingiva, and posterior pharynx;     RESPIRATORY: normal respiratory rate and pattern with no distress; normal breath sounds with no rales, rhonchi, wheezes or rubs;     CARDIOVASCULAR: normal rate; rhythm is regular;  no systolic murmur; no edema;     GASTROINTESTINAL: nontender; normal bowel sounds;     MUSCULOSKELETAL: normal gait; normal overall tone Straight leg raise positive bilaterally although he can raise legs to > 45 degrees.;     NEUROLOGIC: mental status: alert and  oriented x 3;     PSYCHIATRIC:  appropriate affect and demeanor; normal speech pattern; grossly normal memory;         ASSESSMENT           401.0   I10  Hypertensive urgency              DDx:     149.0   C14.0  Carcinoma of throat              DDx:     724.2   F41.8  Chronic low back pain              DDx:     244.8   E03.9  Acquired hypothyroidism, other specified cause              DDx:     238.4   D45  Polycythemia vera              DDx:     780.2   R55  Syncope              DDx:         ORDERS:         Meds Prescribed:       Refill of: Metoprolol Succinate 50mg Tablets, Extended Release Take 1 tablet(s) by mouth BID  #60 (Sixty) tablet(s) Refills: 3       Refill of: Lisinopril (Lisinopril)  40mg Tablet 1 tab daily  #30 (Thirty) tablet(s) Refills: 2         Lab Orders:       [id number]  [facility] PHYSICAL: CMP, CBC, TSH, LIPID: [id number], [id number], [id number], [id number]  (Send-Out)           Procedures Ordered:       REFER  Referral to Specialist or Other Facility  (Send-Out)                   PLAN:          Hypertensive urgency We had several things to discuss today, chiefly his BP and low back pain. He has no evidence of hypertensive emergency, although labs are ordered today and are pending. Lisinopril increased from 10 to 40 mg qd and metoprolol continued at 50 mg BID. Will see pt again in 1 week to help ensure BP is slowly trending down to normal and to cont to address several health issues.     LABORATORY:  Labs ordered to be performed today include PHYSICAL PANEL; CMP, CBC, TSH, LIPID.            Prescriptions:       Refill of: Metoprolol Succinate 50mg Tablets, Extended Release Take 1 tablet(s) by mouth BID  #60 (Sixty) tablet(s) Refills: 3       Refill of: Lisinopril (Lisinopril)  40mg Tablet 1 tab daily  #30 (Thirty) tablet(s) Refills: 2           Orders:       [id number]  [facility] PHYSICAL: CMP, CBC, TSH, LIPID: [id number], [id number], [id number], [id number]  (Send-Out)            Carcinoma of throat Cont management with Dr. Pantoja, records  requested.          Chronic low back pain Pt says he had x-rays, CT, and MRI of his back at [facility] ER 2-3 months ago. Records requested. Will refer to PT and consider referral to pain management at [facility] at next week's apt depending on records from [facility].         REFERRALS:  Referral initiated to physical therapy.            Orders:       REFER  Referral to Specialist or Other Facility  (Send-Out)            Acquired hypothyroidism, other specified cause s/p radiation for throat cancer. Pt says he does not need a refill as Dr. Pantoja has been filling this.          Polycythemia vera labs pending, cont management with Dr. Pantoja.          Syncope This issue appears stable, unchanged over the last 11 years, but will get EKG and possibly carotid US at next visit.             CHARGE CAPTURE           **Please note: ICD descriptions below are intended for billing purposes only and may not represent clinical diagnoses**        Primary Diagnosis:         401.0 Hypertensive urgency            I10    Essential (primary) hypertension              Orders:          [id number]   Office/outpatient visit; established patient, level 4  (In-House)           149.0 Carcinoma of throat            C14.0    Malignant neoplasm of pharynx, unspecified    724.2 Chronic low back pain            F41.8    Other specified anxiety disorders    244.8 Acquired hypothyroidism, other specified cause            E03.9    Hypothyroidism, unspecified    238.4 Polycythemia vera            D45    Polycythemia vera    780.2 Syncope            R55    Syncope and collapse

## 2021-05-18 NOTE — PROGRESS NOTES
Dixon Amador 1945     Office/Outpatient Visit    Visit Date: Mon, Apr 29, 2019 01:40 pm    Provider: Ilir Mireles MD (Assistant: Shayy Wu RN)    Location: [facility]        Electronically signed by Ilir Mireles MD on  05/04/2019 02:55:56 PM                             SUBJECTIVE:        HPI:     Pt went to [facility] ER on 4/5 for 4-5 days of constipation. A KUB showed a unusual round lucency projecting over the left abdomen, possibly artifact. Pt was given some kind of drink that helped flush him out. Fu is recommended by [facility] radiologist.     BP today is much better. 144/68 with HR of 62. He is on metoprolol 50 mg qd, amlodipine 5 mg qd, and lisinioprpil 40 mg qd.     Pt reports persistent insomnia, difficulty falling asleep despte taking melatonin.     Pt has hypothyoidism after treatment for throat cancer. Needs refill of levothyroxine.     ROS:     CONSTITUTIONAL:  Negative for fatigue and fever.      E/N/T:  Negative for diminished hearing and nasal congestion.      CARDIOVASCULAR:  Negative for chest pain and palpitations.      RESPIRATORY:  Negative for recent cough and dyspnea.      GASTROINTESTINAL:  Positive for constipation ( resolved ).   Negative for abdominal pain, diarrhea, nausea or vomiting.      MUSCULOSKELETAL:  Positive for back pain.   Negative for arthralgias or myalgias.      NEUROLOGICAL:  Negative for headaches and weakness.      PSYCHIATRIC:  Positive for insomnia.   Negative for anxiety or depression.          PMH/FMH/SH:     Last Reviewed on 5/04/2019 02:54 PM by Ilir Mireles    Past Medical History:             PAST MEDICAL HISTORY         Hypertension     Pneumonia     Hypothyroidism: after radiation;     tonsil CA - chemo and radiation - 11 years ago - now cleared     surgery for busted blood vessel         CURRENT MEDICAL PROVIDERS:    Oncologist: Dr. Dr. Pantoja in [facility]             COLORECTAL CANCER SCREENING:  Up to date (colonoscopy q10y; sigmoidoscopy q5y; Cologuard q3y) was last done patient thinks it in 2014; colonoscopy with normal results; Dr. Benitez         Surgical History:         Throat surgery for CA;    Back surgery X 2 in 1987;         Family History:     Father: Cause of death was MI     Mother: Healthy     Paternal Grandfather: Cause of death was MI     Maternal Grandfather: Cause of death was MI         Social History:     Occupation: Retired (Prior occupation: [employer])     Marital Status:  ( and remarried)     Children: 1 step-child         Tobacco/Alcohol/Supplements:     Last Reviewed on 5/04/2019 02:54 PM by Ilir Mireles    Tobacco: He has a past history of cigarette smoking; quit date:  1999.          Alcohol: Frequency: Socially         Substance Abuse History:     Last Reviewed on 5/04/2019 02:54 PM by Ilir Mireles    None         Mental Health History:     Last Reviewed on 5/04/2019 02:54 PM by Ilir Mireles    NEGATIVE         Communicable Diseases (eg STDs):     Last Reviewed on 5/04/2019 02:54 PM by Ilir Mireles    Reportable health conditions; NEGATIVE             Current Problems:     Last Reviewed on 5/04/2019 02:54 PM by Ilir Mireles    Acquired hypothyroidism, other iatrogenic cause     Osteopenia     Patient visit for long term (current) drug use; other     Benign HTN     Syncope     Polycythemia vera     Acquired hypothyroidism, other specified cause     Chronic low back pain     Carcinoma of throat     Hypertensive urgency     Nonspecific gastrointestinal (GI) tract abnormality on KUB     Insomnia     Screening for cardiovascular conditions     Screening for depression         Immunizations:     Fluzone (3 + years dose) 11/1/2017     Influenza Virus Vaccine, unspecified formulation 11/1/2018     PNEUMOVAX 23 (Pneumococcal PPV23) 3/28/2019         Allergies:     Last Reviewed on 5/04/2019 02:54 PM by Ilir Mireles      No Known Drug Allergies.      Atorvastatin Calcium: Liver function abnormalities (Adverse Reaction)        Current Medications:     Last Reviewed on 5/04/2019 02:54 PM by Ilir Mireles    Metoprolol Succinate 50mg Tablets, Extended Release Take 1 tablet(s) by mouth BID     Levothyroxine Sodium 0.15mg Tablet Take 1 tablet(s) by mouth daily     Amlodipine  5mg Tablet Take 1 tablet daily for Hypertension     Melatonin 5mg Tablet Take 1 tablet(s) by mouth at bedtime     Lisinopril 40mg Tablet 1 tab daily         OBJECTIVE:        Vitals:         Current: 4/29/2019 1:49:56 PM    Ht:  5 ft, 9 in;  Wt: 131 lbs;  BMI: 19.3    T: 98.2 F (oral);  BP: 144/68 mm Hg (right arm, sitting);  P: 62 bpm (right arm (BP Cuff), sitting);  sCr: 0.96 mg/dL;  GFR: 57.77        Exams:     PHYSICAL EXAM:     GENERAL: Vitals recorded well developed, well nourished, thin;  well groomed;  no apparent distress;     E/N/T: OROPHARYNX:  normal mucosa, dentition, gingiva, and posterior pharynx;     NECK: thyroid exam reveals nonpalpable;     RESPIRATORY: normal respiratory rate and pattern with no distress; normal breath sounds with no rales, rhonchi, wheezes or rubs;     CARDIOVASCULAR: normal rate; rhythm is regular;  no systolic murmur; no edema;     GASTROINTESTINAL: nontender; normal bowel sounds;     MUSCULOSKELETAL: normal gait; normal overall tone     NEUROLOGIC: mental status: alert and oriented x 3;     PSYCHIATRIC:  appropriate affect and demeanor; normal speech pattern; grossly normal memory;         ASSESSMENT           793.4   R93.3  Nonspecific gastrointestinal (GI) tract abnormality on KUB              DDx:     401.1   I10  Benign HTN              DDx:     307.41   F51.01  Insomnia              DDx:     244.3   E03.2  Acquired hypothyroidism, other iatrogenic cause              DDx:         ORDERS:         Meds Prescribed:       Refill of: Melatonin 5mg Tablet Take 1-2 tablet(s) by mouth at bedtime  #60 (Sixty) tablet(s) Refills: 2       Trazodone HCl 50mg  Tablet 1 - 2 @HS  #30 (Thirty) tablet(s) Refills: 1       Refill of: Levothyroxine Sodium 0.15mg Tablet Take 1 tablet(s) by mouth daily  #90 (Ninety) tablet(s) Refills: 2         Radiology/Test Orders:       [id number]  Radiologic examination, abdomen; single anteroposterior view  (Send-Out)                   PLAN:          Nonspecific gastrointestinal (GI) tract abnormality on KUB KUB ordered and shows multiple borderline to mildly air distended loops of small bowel, suggestive of ileus. As patient is asymptotic at this time we will monitor conservatively.         RADIOLOGY:  I have ordered a KUB to be done today.            Orders:       [id number]  Radiologic examination, abdomen; single anteroposterior view  (Send-Out)            Benign HTN BP much improved although not completely normal. Will continue current regimen given improvement and consider adjustments as BP is monitored.          Insomnia Will add trazadone to melatonin for insomnia.           Prescriptions:       Refill of: Melatonin 5mg Tablet Take 1-2 tablet(s) by mouth at bedtime  #60 (Sixty) tablet(s) Refills: 2       Trazodone HCl 50mg Tablet 1 - 2 @HS  #30 (Thirty) tablet(s) Refills: 1          Acquired hypothyroidism, other iatrogenic cause Refill given of levothyroxine.           Prescriptions:       Refill of: Levothyroxine Sodium 0.15mg Tablet Take 1 tablet(s) by mouth daily  #90 (Ninety) tablet(s) Refills: 2             CHARGE CAPTURE           **Please note: ICD descriptions below are intended for billing purposes only and may not represent clinical diagnoses**        Primary Diagnosis:         793.4 Nonspecific gastrointestinal (GI) tract abnormality on KUB            R93.3    Abnormal findings on diagnostic imaging of other parts of digestive tract              Orders:          [id number]   Office/outpatient visit; established patient, level 4  (In-House)           401.1 Benign HTN            I10    Essential (primary) hypertension    307.41 Insomnia             F51.01    Primary insomnia    244.3 Acquired hypothyroidism, other iatrogenic cause            E03.2    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        ADDENDUMS:      ____________________________________    Addendum: 05/09/2019 09:06 PM - Ilir Mireles        Chief omplaint: Pt here for follow up of constipation, HTN, and insomnia.

## 2021-05-18 NOTE — PROGRESS NOTES
"Dixon Amador  1945     Office/Outpatient Visit    Visit Date: Mon, Aug 24, 2020 09:11 am    Provider: Ilir Mireles MD (Assistant: Michael Leslie, )    Location: [facility]        Electronically signed by Ilir Mireles MD on  08/24/2020 06:23:33 PM                             Subjective:        CC: Long is a 74 year old White male.  This is a follow-up visit.  (NOT TAKING NORCO, HYDROXYCHLOROQUINE, OR METOPROLOL)        HPI:       BP today is 130/60 with a HR of 61. He is on losartan 100 mg qd, HCTZ 25 mg qd, and amlodipine 10 mg qd. He is not sure if he's on metoprolol 100 mg qd.  He checks BP every other day and its often 140/70 with a HR in the 60s.      Pt was scheduled to see Angeli Fry on 6/23 but he canceled this as he says, \"There's no cure for it so I didn't see any sense in going to the appointment.\" His joint pains are about the same, pain varies and he thinks its worse with rainy, damp, and cold weather. I prescribed him norco 7.5 and he takes this sparingly. He is afraid to take hydroxychloroquine due to potential side effects.    ROS:     CONSTITUTIONAL:  Negative for fatigue and fever.      E/N/T:  Negative for diminished hearing and nasal congestion.      CARDIOVASCULAR:  Negative for chest pain, orthopnea and palpitations.      RESPIRATORY:  Positive for persistent cough ( typically dry ) and dyspnea ( with moderate exertion ).      GASTROINTESTINAL:  Positive for constipation ( resolved ).   Negative for abdominal pain, diarrhea, nausea or vomiting.      MUSCULOSKELETAL:  Positive for arthralgias and (left shoulder, both hands, getting worse) back pain.   Negative for myalgias.      NEUROLOGICAL:  Negative for headaches and weakness.      PSYCHIATRIC:  Positive for insomnia.   Negative for anxiety or depression.          Past Medical History / Family History / Social History:         Last Reviewed on 8/24/2020 06:22 PM by Ilir Mireles    Past Medical History:     "         PAST MEDICAL HISTORY         Hypertension     Pneumonia     Hypothyroidism: after radiation;     tonsil CA - chemo and radiation - 11 years ago - now cleared     surgery for busted blood vessel         CURRENT MEDICAL PROVIDERS:    Oncologist: Dr. Dr. Pantoja in [facility]             COLORECTAL CANCER SCREENING: Up to date (colonoscopy q10y; sigmoidoscopy q5y; Cologuard q3y) was last done patient thinks it in 2014; colonoscopy with normal results; Dr. Benitez         Surgical History:         throat cancer resection in 2008;    Back surgery X 2 in 1987;         Family History:     Father: Cause of death was MI     Mother: Healthy     Paternal Grandfather: Cause of death was MI     Maternal Grandfather: Cause of death was MI         Social History:     Occupation: Retired (Prior occupation: [employer])     Marital Status:  ( and remarried)     Children: 1 step-child         Tobacco/Alcohol/Supplements:     Last Reviewed on 8/24/2020 06:22 PM by Ilir Mireles    Tobacco: He has a past history of cigarette smoking; quit date:  1999.          Alcohol: Frequency: Socially         Substance Abuse History:     Last Reviewed on 8/24/2020 06:22 PM by Ilir Mireles    None         Mental Health History:     Last Reviewed on 8/24/2020 06:22 PM by Ilir Mireles    NEGATIVE         Communicable Diseases (eg STDs):     Last Reviewed on 8/24/2020 06:22 PM by Ilir Mireles    Reportable health conditions; NEGATIVE         Current Problems:     Last Reviewed on 8/24/2020 06:22 PM by Ilir Mireles    Malignant neoplasm of pharynx, unspecified    Polycythemia vera    Hypothyroidism, unspecified    Other specified anxiety disorders    Syncope and collapse    Radiculopathy, lumbar region    Essential (primary) hypertension    Primary insomnia    Other specified disorders of bone, multiple sites    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     Constipation, unspecified    Shortness of breath    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, right hand    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, left hand    Encounter for screening for malignant neoplasm of prostate    Cough    Other long term (current) drug therapy        Immunizations:     Fluzone (3 + years dose) 11/1/2017    Fluad pf 9/4/2019    Influenza Virus Vaccine, unspecified formulation 11/1/2018    PNEUMOVAX 23 (Pneumococcal PPV23) 3/28/2019        Allergies:     Last Reviewed on 8/24/2020 06:22 PM by Ilir Mireles    Atorvastatin Calcium: Liver function abnormalities  (Adverse Reaction)    LISINOPRIL 40MG TABLETS: Dry cough  (Adverse Reaction)        Current Medications:     Last Reviewed on 8/24/2020 06:22 PM by Ilir Mireles    Metoprolol Succinate 100 mg oral Tablet, Extended Release 24 hr [Take 1 tablet by mouth daily]    levothyroxine 125 mcg oral tablet [TAKE 1 TABLET BY MOUTH DAILY BEFORE BREAKFAST]    levothyroxine 112 mcg oral tablet [alternate between 112 and 125 mcg tabs every other day; take 1 tablet mouth on an empty stomach once daily]    Norco 7.5-325 mg oral tablet [1 tab PO daily prn pain]    Melatonin     traZODone 50 mg oral tablet [TAKE 1 TO 2 TABLETS BY MOUTH EVERY NIGHT AT BEDTIME AS NEEDED]    hydroCHLOROthiazide 25 mg oral tablet [take 1 tablet (25 mg) by oral route once per day]    Vitamin B-12 500 mcg oral tablet [1 tab daily]    amLODIPine 10 mg oral tablet [TK 1 T PO QD]    losartan 100 mg oral tablet [take 1 tablet (100 mg) by oral route once daily]    hydroxychloroquine 200 mg oral tablet [take 1 tablet (200 mg) by oral route 2 times per day]        Objective:        Vitals:         Current: 8/24/2020 9:18:59 AM    Ht:  5 ft, 9 in;  Wt: 158.8 lbs;  BMI: 23.5T: 97.5 F (temporal);  BP: 130/60 mm Hg (right arm, sitting);  P: 61 bpm (right arm (BP Cuff), sitting);  sCr: 0.92 mg/dL;  GFR: 64.48        Exams:     PHYSICAL EXAM:     GENERAL: Vitals recorded  well developed, well nourished, thin;  well groomed;  no apparent distress;     EYES: PERRL, EOMI     E/N/T: OROPHARYNX:  normal mucosa, dentition, gingiva, and posterior pharynx;     NECK: range of motion is normal;     RESPIRATORY: normal respiratory rate and pattern with no distress; normal breath sounds with no rales, rhonchi, wheezes or rubs;     CARDIOVASCULAR: normal rate; rhythm is regular;  no systolic murmur; no cyanosis; no edema;     GASTROINTESTINAL: nontender; normal bowel sounds; no masses;     MUSCULOSKELETAL: normal gait; no limb or joint pain with range of motion; muscle strength: 5/5 in all major muscle groups;  normal overall tone spine: no scoliosis or other abnormal spinal curvatures; swelling PIPs bilaterally;     NEUROLOGIC: mental status: alert and oriented x 3; GROSSLY INTACT     PSYCHIATRIC:  appropriate affect and demeanor; normal speech pattern; grossly normal memory;         Assessment:         I10   Essential (primary) hypertension       M06.041   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, right hand           ORDERS:         Meds Prescribed:       [Refilled] metoprolol succinate 50 mg oral Tablet, Extended Release 24 hr [take 1 tablet (50 mg) by oral route once daily], #90 (ninety) tablets, Refills: 2 (two)       [Refilled] Norco 7.5-325 mg oral tablet [1 tab PO BID prn for pain], #60 (sixty) tablets, Refills: 0 (zero)                 Plan:         Essential (primary) hypertensionBP today is pretty good although his BP tends to vary and can be high. Metoprolol is re-started at a lower dose of 50 mg qd. Cont losartan 100 mg qd, HCTZ 25 mg qd, and amlodipine 10 mg qd.          Prescriptions:       [Refilled] metoprolol succinate 50 mg oral Tablet, Extended Release 24 hr [take 1 tablet (50 mg) by oral route once daily], #90 (ninety) tablets, Refills: 2 (two)         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, right handRA is overall mild but given good Sx control with norco 7.5  mg this is increased from qd to BID, although he really only takes this a few times a week. We will consider re-trying hydroxychloriquine in a few months, as I believe the benefits outweigh the risk, especially when it gets colder and RA is worse.     Controlled substance documentation: Ihsan reviewed; prior drug screen consistent; consent is reviewed and signed and on the chart.  He is aware of risk of addiction on this medication, understands that he will need to follow up for a review every 3 months and his medications will be adjusted or decreased as deemed appropriate at each visit.  No history of drug or alcohol abuse.  No concerns about diversion or abuse. He denies side effects related to the medication.  He is aware that he may be called in for pill counts.  The dosing of this medication will be reviewed on a regular basis and reduced if possible..  Ongoing use of a controlled substance is necessary for this patient to have a normal quality of life           Prescriptions:       [Refilled] Norco 7.5-325 mg oral tablet [1 tab PO BID prn for pain], #60 (sixty) tablets, Refills: 0 (zero)             Charge Capture:         Primary Diagnosis:     I10  Essential (primary) hypertension           Orders:      [id number]  Office/outpatient visit; established patient, level 4  (In-House)              M06.041  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, right hand

## 2021-05-18 NOTE — PROGRESS NOTES
"Dixon Amador 1945     Office/Outpatient Visit    Visit Date: Thu, Mar 7, 2019 01:45 pm    Provider: Ilir Mireles MD (Assistant: Shayna Maravilla MA)    Location: [facility]        Electronically signed by Ilir Mireles MD on  03/07/2019 05:35:31 PM                             SUBJECTIVE:        CC:     Mr. Amador is a 73 year old White male.  Patient complains of back pain. He had two surgeries on his back several years ago after a work related injury.          HPI:     BP today is 174/81 with a HR of 75. Last visit I increased his lisinopril from 10 mg to 40 mg qd but he says [facility] never called him and so he's still on lisinopril 10 mg qd. Also on metoprolol 50 mg BID. No headache or blurry vision.     Pt has chronic low back pain. h/o DDD, he had 2 operations including a fusion that he reports prevents him from getting any additional surgeries. Lower back, more on the right side, radiates down his legs, stabbing and \"catching\" pain. Steady. Occasionally severe. Hard time walking, he uses a cane, although walking does help some. He takes aspirin.     ROS:     CONSTITUTIONAL:  Negative for fatigue and fever.      EYES:  Negative for blurred vision.      E/N/T:  Negative for diminished hearing and nasal congestion.      CARDIOVASCULAR:  Negative for chest pain and palpitations.      RESPIRATORY:  Negative for recent cough and dyspnea.      GASTROINTESTINAL:  Negative for abdominal pain, constipation, diarrhea, nausea and vomiting.      MUSCULOSKELETAL:  Positive for back pain.   Negative for arthralgias or myalgias.      NEUROLOGICAL:  Positive for brief syncope.   Negative for headaches or weakness.      PSYCHIATRIC:  Negative for anxiety, depression and sleep disturbance.          PMH/FMH/SH:     Last Reviewed on 3/07/2019 01:46 PM by Shayna Maravilla    Past Medical History:             PAST MEDICAL HISTORY         Hypertension     Pneumonia     Hypothyroidism: after radiation;     " tonsil CA - chemo and radiation - 11 years ago - now cleared     surgery for busted blood vessel         CURRENT MEDICAL PROVIDERS:    Oncologist: Dr. Dr. Pantoja in [facility]             COLORECTAL CANCER SCREENING: Up to date (colonoscopy q10y; sigmoidoscopy q5y; Cologuard q3y) was last done patient thinks it in 2014; colonoscopy with normal results; Dr. Benitez         Surgical History:         Throat surgery for CA;    Back surgery X 2 in 1987;         Family History:     Father: Cause of death was MI     Mother: Healthy     Paternal Grandfather: Cause of death was MI     Maternal Grandfather: Cause of death was MI         Social History:     Occupation: Retired (Prior occupation: [employer])     Marital Status:  ( and remarried)     Children: 1 step-child         Tobacco/Alcohol/Supplements:     Last Reviewed on 3/07/2019 01:46 PM by Shayna Maravilla    Tobacco: He has a past history of cigarette smoking; quit date:  1999.          Alcohol: Frequency: Socially         Substance Abuse History:     Last Reviewed on 3/07/2019 01:46 PM by Shayna Maravilla    None         Mental Health History:     Last Reviewed on 3/07/2019 01:46 PM by Shayna Maravilla    NEGATIVE         Communicable Diseases (eg STDs):     Last Reviewed on 3/07/2019 01:46 PM by Shayna Maravilla    Reportable health conditions; NEGATIVE             Current Problems:     Last Reviewed on 3/07/2019 01:46 PM by Shayna Maravilla    Syncope     Polycythemia vera     Acquired hypothyroidism, other specified cause     Chronic low back pain     Carcinoma of throat     Hypertensive urgency         Immunizations:     Fluzone (3 + years dose) 11/1/2017     Influenza Virus Vaccine, unspecified formulation 11/1/2018         Allergies:     Last Reviewed on 3/07/2019 01:46 PM by Shayna Maravilla      No Known Drug Allergies.     Atorvastatin Calcium: Liver function abnormalities (Adverse Reaction)        Current  Medications:     Last Reviewed on 3/07/2019 01:46 PM by Shayna Maravilla    Metoprolol Succinate 50mg Tablets, Extended Release Take 1 tablet(s) by mouth BID     Levothyroxine Sodium 0.15mg Tablet Take 1 tablet(s) by mouth daily     Lisinopril 40mg Tablet 1 tab daily         OBJECTIVE:        Vitals:         Current: 3/7/2019 1:49:59 PM    Ht:  5 ft, 9 in;  Wt: 135.6 lbs;  BMI: 20.0    T: 99 F (oral);  BP: 174/81 mm Hg (right arm, sitting);  P: 75 bpm (right arm (BP Cuff), sitting)        Exams:     PHYSICAL EXAM:     GENERAL: Vitals recorded well developed, well nourished, thin;  well groomed;  no apparent distress;     EYES: extraocular movements intact; conjunctiva and cornea are normal; PERRLA;     E/N/T: OROPHARYNX:  normal mucosa, dentition, gingiva, and posterior pharynx;     RESPIRATORY: normal respiratory rate and pattern with no distress; normal breath sounds with no rales, rhonchi, wheezes or rubs;     CARDIOVASCULAR: normal rate; rhythm is regular;  no systolic murmur; no edema;     GASTROINTESTINAL: nontender; normal bowel sounds;     MUSCULOSKELETAL: normal gait; normal overall tone     NEUROLOGIC: mental status: alert and oriented x 3;     PSYCHIATRIC:  appropriate affect and demeanor; normal speech pattern; grossly normal memory;         Lab/Test Results:             Urine temperature:  cannot be confirmed (03/07/2019),     All urine drug screen levels confirmed negative:  yes (03/07/2019),     Performed by:  ZAHIDA (03/07/2019),     Collection Time:  1436 (03/07/2019),             ASSESSMENT           401.1   I10  Benign HTN              DDx:     724.2   F41.8  Chronic low back pain              DDx:     V58.69   M54.16  Patient visit for long term (current) drug use; other              DDx:         ORDERS:         Meds Prescribed:       Refill of: Metoprolol Succinate 50mg Tablets, Extended Release Take 1 tablet(s) by mouth BID  #60 (Sixty) tablet(s) Refills: 3       Refill of: Lisinopril 40mg Tablet 1  tab daily  #30 (Thirty) tablet(s) Refills: 2       Norco (Hydrocodone/Acetaminophen) 7.5mg/325mg Tablet 1 tab PO daily prn pain  #30 (Thirty) tablet(s) Refills: 0         Radiology/Test Orders:       [id number]  Radiologic examination, spine, lumbosacral;  minimum of four views  (Send-Out)           Lab Orders:       [id number]  [facility] PHYSICAL: CMP, CBC, TSH, LIPID: [id number], [id number], [id number], [id number]  (Send-Out)         [id number]  Drug test prsmv qual dir optical obs per day  (In-House)                   PLAN:          Benign HTN Will again increase lisinopril from 10 to 40 mg qd and order metoprolol 50 mg BID. At his next visit we should look into changing this to 100 mg qd or switching succinate to tartate.     LABORATORY:  Labs ordered to be performed today include PHYSICAL PANEL; CMP, CBC, TSH, LIPID.            Prescriptions:       Refill of: Metoprolol Succinate 50mg Tablets, Extended Release Take 1 tablet(s) by mouth BID  #60 (Sixty) tablet(s) Refills: 3       Refill of: Lisinopril 40mg Tablet 1 tab daily  #30 (Thirty) tablet(s) Refills: 2           Orders:       [id number]  [facility] PHYSICAL: CMP, CBC, TSH, LIPID: [id number], [id number], [id number], [id number]  (Send-Out)            Chronic low back pain x-ray ordered for chronic, severe low back pain. Pt has responded well to norco in the past and did not have any issues with abuse. No drug seeking behavior.         RADIOLOGY:  I have ordered Lumbar/Sacral Spine X-ray to be done today.  Controlled substance documentation: Ihsan reviewed; drug screen performed and appropriate; consent is reviewed and signed and on the chart.  He is aware of risk of addiction on this medication, understands that he will need to follow up for a review every 3 months and his medications will be adjusted or decreased as deemed appropriate at each visit.  No history of drug or alcohol abuse.  No concerns about diversion or abuse. He denies side effects related to the medication.  He is aware that he may be called in  for pill counts.  The dosing of this medication will be reviewed on a regular basis and reduced if possible..  Ongoing use of a controlled substance is necessary for this patient to have a normal quality of life           Prescriptions:       Norco (Hydrocodone/Acetaminophen) 7.5mg/325mg Tablet 1 tab PO daily prn pain  #30 (Thirty) tablet(s) Refills: 0           Orders:       [id number]  Radiologic examination, spine, lumbosacral;  minimum of four views  (Send-Out)            Patient visit for long term (current) drug use; other     LABORATORY:  Labs ordered to be performed today include Drug screen.            Orders:       [id number]  Drug test prsmv qual dir optical obs per day  (In-House)               CHARGE CAPTURE           **Please note: ICD descriptions below are intended for billing purposes only and may not represent clinical diagnoses**        Primary Diagnosis:         401.1 Benign HTN            I10    Essential (primary) hypertension              Orders:          [id number]   Office/outpatient visit; established patient, level 4  (In-House)           724.2 Chronic low back pain            F41.8    Other specified anxiety disorders    V58.69 Patient visit for long term (current) drug use; other            M54.16    Radiculopathy, lumbar region              Orders:          [id number]   Drug test prsmv qual dir optical obs per day  (In-House)

## 2021-05-18 NOTE — PROGRESS NOTES
"Dixon Amador  1945     Office/Outpatient Visit    Visit Date: Wed, Nov 11, 2020 09:22 am    Provider: Ilir Mireles MD (Assistant: Hien Cavazos MA)    Location: [facility]        Electronically signed by Ilir Mireles MD on  11/11/2020 05:27:08 PM                             Subjective:        CC: NOT TAKING LEVOTHYROXINE 112MCG, HYDROXYCHLOROQUINEJoe is a 75 year old White male.  This is a follow-up visit.  check up         HPI:       BP today is 127/65 with a HR of 61. He is on losartan 100 mg qd, metoprolol 50 mg qd, HCTZ 25 mg qd, and amlodipine 10 mg qd. He checks BP every other day and its often 140/70 with a HR in the 60s.      TSH has been 0.529 -> 0.458 -> 0.05 -> 0.076 (1/9/20). Pt has hypothyroidism that developed after radiation treatment for throat cancer. He alternates levothyroxine between 125 and he's not sure what the other dose is, either 112 mcg or 137 mcg. He stays cold anyway but this is not worse.      Pt continues to have pain in both hands. These episodes of pain are severe and happen once every 2 weeks. Pain in both hands, either shoulder, low back, and ankles. Pt was scheduled to see Angeli Fry on 6/23 but he canceled this as he says, \"There's no cure for it so I didn't see any sense in going to the appointment.\" He says norco 7.5 mg and he takes this sparingly, only when pain is terrible. He is afraid to take hydroxychloroquine due to potential side effects.    ROS:     CONSTITUTIONAL:  Negative for fatigue and fever.      E/N/T:  Negative for diminished hearing and nasal congestion.      CARDIOVASCULAR:  Negative for chest pain, orthopnea and palpitations.      RESPIRATORY:  Positive for persistent cough ( typically dry ) and dyspnea ( with moderate exertion ).      GASTROINTESTINAL:  Positive for constipation ( resolved ).   Negative for abdominal pain, diarrhea, nausea or vomiting.      MUSCULOSKELETAL:  Positive for arthralgias and (left " shoulder, both hands, getting worse) back pain.   Negative for myalgias.      NEUROLOGICAL:  Negative for headaches and weakness.      PSYCHIATRIC:  Positive for insomnia.   Negative for anxiety or depression.          Past Medical History / Family History / Social History:         Last Reviewed on 11/11/2020 09:43 AM by Ilir Mireles    Past Medical History:             PAST MEDICAL HISTORY         Hypertension     Pneumonia     Hypothyroidism: after radiation;     tonsil CA - chemo and radiation - 11 years ago - now cleared     surgery for busted blood vessel         CURRENT MEDICAL PROVIDERS:    Oncologist: Dr. Dr. Pantoja in [facility]             COLORECTAL CANCER SCREENING: Up to date (colonoscopy q10y; sigmoidoscopy q5y; Cologuard q3y) was last done patient thinks it in 2014; colonoscopy with normal results; Dr. Benitez         Surgical History:         throat cancer resection in 2008;    Back surgery X 2 in 1987;         Family History:     Father: Cause of death was MI     Mother: Healthy     Paternal Grandfather: Cause of death was MI     Maternal Grandfather: Cause of death was MI         Social History:     Occupation: Retired (Prior occupation: [employer])     Marital Status:  ( and remarried)     Children: 1 step-child         Tobacco/Alcohol/Supplements:     Last Reviewed on 11/11/2020 09:43 AM by Ilir Mireles    Tobacco: He has a past history of cigarette smoking; quit date:  1999.          Alcohol: Frequency: Socially         Substance Abuse History:     Last Reviewed on 11/11/2020 09:43 AM by Ilir Mireles    None         Mental Health History:     Last Reviewed on 11/11/2020 09:43 AM by Ilir Mireles    NEGATIVE         Communicable Diseases (eg STDs):     Last Reviewed on 11/11/2020 09:43 AM by Ilir Mireles    Reportable health conditions; NEGATIVE         Current Problems:     Last Reviewed on 11/11/2020 09:43 AM by Ilir Mireles  M    Malignant neoplasm of pharynx, unspecified    Polycythemia vera    Hypothyroidism, unspecified    Other specified anxiety disorders    Syncope and collapse    Radiculopathy, lumbar region    Essential (primary) hypertension    Primary insomnia    Other specified disorders of bone, multiple sites    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    Constipation, unspecified    Shortness of breath    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, right hand    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, left hand    Encounter for screening for malignant neoplasm of prostate    Cough    Other long term (current) drug therapy        Immunizations:     Fluzone (3 + years dose) 11/1/2017    Fluad pf 9/4/2019    Influenza Virus Vaccine, unspecified formulation 11/1/2018    PNEUMOVAX 23 (Pneumococcal PPV23) 3/28/2019    influenza, high-dose, quadrivalent 9/22/2020        Allergies:     Last Reviewed on 11/11/2020 09:43 AM by Ilir Mireles    Atorvastatin Calcium: Liver function abnormalities  (Adverse Reaction)    LISINOPRIL 40MG TABLETS: Dry cough  (Adverse Reaction)        Current Medications:     Last Reviewed on 11/11/2020 09:43 AM by Ilir Mireles    levothyroxine 112 mcg oral tablet [alternate between 112 and 125 mcg tabs every other day; take 1 tablet mouth on an empty stomach once daily]    metoprolol succinate 50 mg oral Tablet, Extended Release 24 hr [take 1 tablet (50 mg) by oral route once daily]    lisinopriL 40 mg oral tablet [TAKE 1 TABLET BY MOUTH DAILY]    levothyroxine 125 mcg oral tablet [TAKE 1 TABLET BY MOUTH DAILY BEFORE BREAKFAST]    levothyroxine 137 mcg oral tablet [TAKE 1 TABLET BY MOUTH DAILY ON AN EMPTY STOMACH]    Norco 7.5-325 mg oral tablet [1 tab PO BID prn for pain]    Melatonin     traZODone 50 mg oral tablet [TAKE 1 TO 2 TABLETS BY MOUTH EVERY NIGHT AT BEDTIME AS NEEDED]    hydroCHLOROthiazide 25 mg oral tablet [TAKE 1 TABLET(25 MG) BY MOUTH EVERY DAY]     Vitamin B-12 500 mcg oral tablet [1 tab daily]    amLODIPine 10 mg oral tablet [TK 1 T PO QD]    losartan 100 mg oral tablet [take 1 tablet (100 mg) by oral route once daily]    hydroxychloroquine 200 mg oral tablet [take 1 tablet (200 mg) by oral route 2 times per day]        Objective:        Vitals:         Current: 11/11/2020 9:27:09 AM    Ht:  5 ft, 9 in;  Wt: 164.4 lbs;  BMI: 24.3T: 98.4 F (oral);  BP: 127/65 mm Hg (left arm, sitting);  P: 61 bpm (left arm (BP Cuff), sitting);  sCr: 0.92 mg/dL;  GFR: 64.48        Exams:     PHYSICAL EXAM:     GENERAL: Vitals recorded well developed, well nourished, thin;  well groomed;  no apparent distress;     EYES: PERRL, EOMI     E/N/T: OROPHARYNX:  normal mucosa, dentition, gingiva, and posterior pharynx;     NECK: range of motion is normal;     RESPIRATORY: normal respiratory rate and pattern with no distress; normal breath sounds with no rales, rhonchi, wheezes or rubs;     CARDIOVASCULAR: normal rate; rhythm is regular;  no systolic murmur; no edema;     GASTROINTESTINAL: nontender;     MUSCULOSKELETAL: normal gait; no limb or joint pain with range of motion; muscle strength: 5/5 in all major muscle groups;  normal overall tone spine: no scoliosis or other abnormal spinal curvatures; swelling PIPs bilaterally;     NEUROLOGIC: mental status: alert and oriented x 3; reflexes: knee jerks: 2+;  GROSSLY INTACT     PSYCHIATRIC:  appropriate affect and demeanor; normal speech pattern; grossly normal memory;         Assessment:         I10   Essential (primary) hypertension       E03.9   Hypothyroidism, unspecified       M06.041   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, right hand           ORDERS:         Lab Orders:       FUTURE  Future order to be done at patients convenience  (Send-Out)            [id number]  [facility] PHYSICAL: CMP, CBC, TSH, LIPID: [id number], [id number], [id number]  (Send-Out)            FUTURE  Future order to be done at patients convenience  (Send-Out)             [id number]  Free triiodothyronine T3  (Send-Out)            [id number]  FT4 - [facility] Thyroxine, free T4  (Send-Out)            [id number]  TOTTH - H T4, Total  (Send-Out)            APPTO  Appointment need  (In-House)                      Plan:         Essential (primary) hypertensionBP is well controlled, cont losartan 100 mg qd, metoprolol 50 mg qd, HCTZ 25 mg qd, and amlodipine 10 mg qd.        FOLLOW-UP: Schedule a follow-up visit in 3 months.:.      FOLLOW-UP TESTING #1: FOLLOW-UP LABORATORY:  Labs to be scheduled in the future include PHYSICAL PANEL; CMP, CBC, TSH, LIPID.   Patient to schedule in 1week.            Orders:       FUTURE  Future order to be done at patients convenience  (Send-Out)            [id number]  [facility]: CMP, CBC, TSH, LIPID: [id number], [id number], [id number], [id number]  (Send-Out)            APPTO  Appointment need  (In-House)              Hypothyroidism, unspecifiedWill check TSH, T4, and T3 later this week and refill levothyroxine at either 112 or 137 mcg/day to alternate with 125 mcg/day depending on results.         FOLLOW-UP TESTING #1: FOLLOW-UP LABORATORY:  Labs to be scheduled in the future include Free T3, Free T4, and total T4.            Orders:       FUTURE  Future order to be done at patients convenience  (Send-Out)            [id number]  Free triiodothyronine T3  (Send-Out)            [id number]  FT4 - [facility] Thyroxine, free T4  (Send-Out)            17195  [facility] - H T4, Total  (Send-Out)              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, right handFor now, cont norco 7.5 mg as needed, consider hydroxychloroquine if Sx worsen.             Patient Recommendations:        For  Essential (primary) hypertension:    Schedule a follow-up visit in 3 months.                APPOINTMENT INFORMATION:        Monday Tuesday Wednesday Thursday Friday Saturday Sunday            Time:___________________AM  PM   Date:_____________________         The following laboratory testing has been ordered:  Schedule a follow-up visit in 1 week.          For  Hypothyroidism, unspecified:            The following laboratory testing has been ordered: total T4             Charge Capture:         Primary Diagnosis:     I10  Essential (primary) hypertension           Orders:      [id number]  Office/outpatient visit; established patient, level 4  (In-House)            APPTO  Appointment need  (In-House)              E03.9  Hypothyroidism, unspecified     M06.041  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, right hand

## 2021-05-18 NOTE — PROGRESS NOTES
Dixon Amador  1945     Office/Outpatient Visit    Visit Date: Wed, Apr 8, 2020 12:48 pm    Provider: Ilir Mireles MD (Assistant: Trindiad Granados MA)    Location: [facility]        Electronically signed by Ilir Mireles MD on  04/08/2020 01:38:27 PM                             Subjective:        CC: Long is a 74 year old White male.  This is a follow-up visit.  3 months         HPI:       Pt checks BP about daily and its often 180/80 or 170/70. He is on lisinopril 40 mg qd, metoprolol 100 mg qd, HCTZ 12.5 mg qd, and amlodipine 10 mg qd. BP was about this high a little over a year ago and then it came down with increasing lisinopril and adding amlodipine and HCTZ. He denies any changes since our last visit 3 months.      Pt reports his joints are hurting worse recently. Pain in both his hands that makes it hard to use them. Worse in right knuckles (PIP). Pt has a h/o rheumatoid arthritis, this was Dx'd about 5 years ago by Beau in [facility]. He has never been treated for this due to paucity of Sx. Labs show elevated rheumatoid factor. ESR and CRP are normal. He has polyarthralgia and joint stiffness in the morning (hands, ankles, both shoulders, and lower back), this gets better throughout the day. I gave pt a steroid shot in left shoulder he reports this was a miracle cure, his shoulder pain improved dramatically.      Pt has hypothyroidism that developed after radiation treatment for throat cancer. He alternates levothyroxine between 125 and 112 mcg every other day. He stays cold anyway but this is not worse.      Pt has a chronic cough and this has been worse over the last 6 months or so. Its worse after eating and he reports his epiglottis was injured during his treatment for tonsil cancer. He is very careful to eat slowly.     ROS:     CONSTITUTIONAL:  Negative for fatigue and fever.      E/N/T:  Negative for diminished hearing and nasal congestion.      CARDIOVASCULAR:  Negative  for chest pain, orthopnea and palpitations.      RESPIRATORY:  Positive for persistent cough ( typically dry ) and dyspnea ( with moderate exertion ).      GASTROINTESTINAL:  Positive for constipation ( resolved ).   Negative for abdominal pain, diarrhea, nausea or vomiting.      MUSCULOSKELETAL:  Positive for arthralgias and (left shoulder) back pain.   Negative for myalgias.      NEUROLOGICAL:  Negative for headaches and weakness.      PSYCHIATRIC:  Positive for insomnia.   Negative for anxiety or depression.          Past Medical History / Family History / Social History:         Last Reviewed on 4/08/2020 01:32 PM by Ilir Mireles    Past Medical History:             PAST MEDICAL HISTORY         Hypertension     Pneumonia     Hypothyroidism: after radiation;     tonsil CA - chemo and radiation - 11 years ago - now cleared     surgery for busted blood vessel         CURRENT MEDICAL PROVIDERS:    Oncologist: Dr. Dr. Pantoja in [facility]             COLORECTAL CANCER SCREENING: Up to date (colonoscopy q10y; sigmoidoscopy q5y; Cologuard q3y) was last done patient thinks it in 2014; colonoscopy with normal results; Dr. Benitez         Surgical History:         throat cancer resection in 2008;    Back surgery X 2 in 1987;         Family History:     Father: Cause of death was MI     Mother: Healthy     Paternal Grandfather: Cause of death was MI     Maternal Grandfather: Cause of death was MI         Social History:     Occupation: Retired (Prior occupation: [employer])     Marital Status:  ( and remarried)     Children: 1 step-child         Tobacco/Alcohol/Supplements:     Last Reviewed on 4/08/2020 01:32 PM by Ilir Mireles    Tobacco: He has a past history of cigarette smoking; quit date:  1999.          Alcohol: Frequency: Socially         Substance Abuse History:     Last Reviewed on 4/08/2020 01:32 PM by Ilir Mireles    None         Mental Health History:      Last Reviewed on 4/08/2020 01:32 PM by Ilir Mireles    NEGATIVE         Communicable Diseases (eg STDs):     Last Reviewed on 4/08/2020 01:32 PM by Ilir Mireles    Reportable health conditions; NEGATIVE         Current Problems:     Last Reviewed on 4/08/2020 01:32 PM by Ilir Mireles    Malignant neoplasm of pharynx, unspecified    Polycythemia vera    Hypothyroidism, unspecified    Other specified anxiety disorders    Syncope and collapse    Radiculopathy, lumbar region    Essential (primary) hypertension    Primary insomnia    Other specified disorders of bone, multiple sites    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    Constipation, unspecified    Shortness of breath    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, right hand    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, left hand    Encounter for screening for malignant neoplasm of prostate        Immunizations:     Fluzone (3 + years dose) 11/1/2017    Fluad pf 9/4/2019    Influenza Virus Vaccine, unspecified formulation 11/1/2018    PNEUMOVAX 23 (Pneumococcal PPV23) 3/28/2019        Allergies:     Last Reviewed on 4/08/2020 01:32 PM by Ilir Mireles    Atorvastatin Calcium: Liver function abnormalities  (Adverse Reaction)        Current Medications:     Last Reviewed on 4/08/2020 01:32 PM by Ilir Mireles    levothyroxine 112 mcg oral tablet [alternate between 112 and 125 mcg tabs every other day; take 1 tablet mouth on an empty stomach once daily]    Melatonin     traZODone 50 mg oral tablet [TAKE 1 TO 2 TABLETS BY MOUTH EVERY NIGHT AT BEDTIME AS NEEDED]    hydroCHLOROthiazide 12.5 mg oral tablet [TAKE 1 TABLET BY MOUTH DAILY]    Vitamin B-12 500 mcg oral tablet [1 tab daily]    METOPROL SUC TAB 100MG ER     AMLODIPINE BESYLATE 10MG TABLETS  [TK 1 T PO QD]    LISINOPRIL 40MG TABLETS  [TK 1 T PO D]    Norco 7.5mg/325mg Tablet [1 tab PO daily prn pain]        Assessment:         I10   Essential (primary)  hypertension       M06.041   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, right hand       E03.2   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       R05   Cough           ORDERS:         Meds Prescribed:       [Refilled] hydroCHLOROthiazide 25 mg oral tablet [take 1 tablet (25 mg) by oral route once per day], #90 (ninety) tablets, Refills: 0 (zero)       [Refilled] Norco 7.5-325 mg oral tablet [1 tab PO daily prn pain], #30 (thirty) tablets, Refills: 0 (zero)       [New Rx] losartan 100 mg oral tablet [take 1 tablet (100 mg) by oral route once daily], #90 (ninety) tablets, Refills: 2 (two)         Procedures Ordered:       REFER  Referral to Specialist or Other Facility  (Send-Out)                      Plan:         Essential (primary) hypertensionBP is very high at home recently, etiology unclear as he has no changes aside from staying home more during COVID 19 self-isolation measures and increased pain with arthritis pain. HCTZ increased from 12.5 mg to 25 mg qd. Lisinopril 40 mg qd is changed to losartan 100 mg qd as pt has a dry cough. Cont metoprolol 100 mg qd and amlodipine 10 mg qd.    Telehealth: Verbal consent obtained for visit to occur via phone call; Staff, other than provider, present during telephone visit include Trinidad Granados; Total time spent was 24 minutes; [id number]--Jujsenwan E/M 21-30 minutes           Prescriptions:       [Refilled] hydroCHLOROthiazide 25 mg oral tablet [take 1 tablet (25 mg) by oral route once per day], #90 (ninety) tablets, Refills: 0 (zero)       [New Rx] losartan 100 mg oral tablet [take 1 tablet (100 mg) by oral route once daily], #90 (ninety) tablets, Refills: 2 (two)         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, right handArthritis pain has been worse recently, norco 7.5 mg is again prescribed as he used 30 tab over 6 months indicating usage is reasonable and sparse. He is referred to rheumatology. He declines prednisone, citing concern over  immunosuppression during COVID 19 pandemic, especially given his h/o cancer.         REFERRALS:  Referral initiated to a rheumatologist ( Dr Angeli Fry ).            Prescriptions:       [Refilled] Norco 7.5-325 mg oral tablet [1 tab PO daily prn pain], #30 (thirty) tablets, Refills: 0 (zero)           Orders:       REFER  Referral to Specialist or Other Facility  (Send-Out)              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Cont alternating levothyroxine 112 mcg and 125 mcg every other day.         CoughDry cough may be from lisinopril so this is changed to losartan. If cough persists, will get CXR.             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

## 2021-05-18 NOTE — PROGRESS NOTES
Dixon Amador  1945     Office/Outpatient Visit    Visit Date: Wed, Apr 7, 2021 10:45 am    Provider: Shayy Galaviz N.P. (Assistant: Trinidad Granados MA)    Location: [facility]        Electronically signed by Shayy Galaviz N.P. on  04/08/2021 02:53:43 PM                             Subjective:        CC: Long is a 75 year old White male.  This is a follow-up visit.  has been out of zetia and omeprazole         HPI:           Long presents in follow up from hospital admission. He was admitted to the hospital on 3/21/21 and discharged on 3/23/21  [facility]  He was diagnosed with left sided weakness, clinical stroke.  The following lab tests were done: electrolyte panel ( K  3.5 ).  The following radiology tests were done: MRI brain, CT brain  (severe stenosis).  Has been on statin in the past caused acute liver failure - This was about 5 years  ago - He will be required to take Zetia for his cholesterol and preventative medication but reports that his insurance will not cover.  We will need to call for this.  He is concerned that the neck problems that he has is possibly from this.  He has not had an evaluation in the past by his previous provider for his neck pain, but wishes to seek a specialist for this .  He was given hydrocodone in the past by his previous PCP, but no recommendations for long term management were given at the time.He does have an appt with the stroke specialist next month and has recently spoke with the neurologist from the hospital  - no changes           Complaint of hypokalemia..  Did have some hypokalemia in hospital, treated with oral and will need follow up tesing on this           Complaint of dysphagia, unspecified..  During his hospitalization, Mr. Amador was evaluated for dysphagia and found to be aspirating on every consistancy of food and was recommended that he get a feeding tube to avoid aspiration pneumonia - he declined and has had speech therapy in the  past, but declines this as well.  He does not wish to pursue anything further regarding his swallowing issues     ROS:     CONSTITUTIONAL:  Negative for chills, fatigue and fever.      CARDIOVASCULAR:  Negative for chest pain and pedal edema.      RESPIRATORY:  Negative for recent cough and dyspnea.      GASTROINTESTINAL:  Negative for abdominal pain, constipation, diarrhea, heartburn, nausea and vomiting.      GENITOURINARY:  Negative for dysuria and change in urine stream.      MUSCULOSKELETAL:  Positive for back pain ( neck pain ).   Negative for arthralgias or myalgias.      INTEGUMENTARY:  Negative for pruritis and rash.      NEUROLOGICAL:  Negative for dizziness, fainting, headaches and paresthesias.      ENDOCRINE:  Negative for hair loss, polydipsia and polyphagia.      ALLERGIC/IMMUNOLOGIC:  Negative for seasonal allergies.      PSYCHIATRIC:  Negative for anxiety, depression and suicidal thoughts.          Past Medical History / Family History / Social History:         Last Reviewed on 4/08/2021 02:53 PM by Shayy Galaviz    Past Medical History:             PAST MEDICAL HISTORY         Hypertension     Pneumonia     Hypothyroidism: after radiation;     tonsil CA - chemo and radiation - 11 years ago - now cleared     surgery for busted blood vessel         CURRENT MEDICAL PROVIDERS:    Oncologist: Dr. Dr. Pantoja in [facility]             COLORECTAL CANCER SCREENING: Up to date (colonoscopy q10y; sigmoidoscopy q5y; Cologuard q3y) was last done patient thinks it in 2014; colonoscopy with normal results; Dr. Benitez         Surgical History:         throat cancer resection in 2008;    Back surgery X 2 in 1987;         Family History:     Father: Cause of death was MI     Mother: Healthy     Paternal Grandfather: Cause of death was MI     Maternal Grandfather: Cause of death was MI         Social History:     Occupation: Retired (Prior occupation: [employer])     Marital  Status:  ( and remarried)     Children: 1 step-child         Tobacco/Alcohol/Supplements:     Last Reviewed on 4/07/2021 11:04 AM by Shayy Galaviz    Tobacco: He has a past history of cigarette smoking; quit date:  1999.          Alcohol: Frequency: Socially         Substance Abuse History:     Last Reviewed on 1/15/2021 05:46 PM by Shayy Galaviz    None         Mental Health History:     Last Reviewed on 1/15/2021 05:46 PM by Shayy Galaviz    NEGATIVE         Communicable Diseases (eg STDs):     Last Reviewed on 1/15/2021 05:46 PM by Shayy Galaviz    Reportable health conditions; NEGATIVE         Current Problems:     Last Reviewed on 4/08/2021 02:53 PM by Shayy Galaviz    Malignant neoplasm of pharynx, unspecified    Polycythemia vera    Hypothyroidism, unspecified    Other specified anxiety disorders    Syncope and collapse    Radiculopathy, lumbar region    Essential (primary) hypertension    Primary insomnia    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    Other specified disorders of bone, multiple sites    Constipation, unspecified    Shortness of breath    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, right hand    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, left hand    Encounter for screening for malignant neoplasm of prostate    Cough    Other long term (current) drug therapy    Encounter for screening for depression    Encounter for immunization    Encounter for general adult medical examination without abnormal findings    Encounter for follow-up examination after completed treatment for conditions other than malignant neoplasm    Cervicalgia    Hypokalemia    Cerebral infarction due to unspecified occlusion or stenosis of unspecified cerebral artery    Dysphagia, unspecified        Immunizations:     influenza, high-dose, quadrivalent 9/22/2020    Pneumococcal conjugate PCV 13 (PREVNAR 13) 1/11/2021    Fluzone (3 + years dose) 11/1/2017     Fluad pf 9/4/2019    Influenza Virus Vaccine, unspecified formulation 11/1/2018    PNEUMOVAX 23 (Pneumococcal PPV23) 3/28/2019        Allergies:     Last Reviewed on 4/07/2021 10:50 AM by Trinidad Granados    Atorvastatin Calcium: Liver function abnormalities  (Adverse Reaction)    LISINOPRIL 40MG TABLETS: Dry cough  (Adverse Reaction)    codeine:          Current Medications:     Last Reviewed on 4/07/2021 10:50 AM by Trinidad Granados    omeprazole 40 mg oral capsule,delayed release (enteric coated) [take 1 capsule (40 mg) by oral route 1 time per day ]    levothyroxine 137 mcg oral tablet [TAKE 1 TABLET BY MOUTH DAILY ON AN EMPTY STOMACH]    Melatonin     traZODone 50 mg oral tablet [TAKE 1 TO 2 TABLETS BY MOUTH EVERY NIGHT AT BEDTIME AS NEEDED]    Vitamin B-12 500 mcg oral tablet [1 tab daily]    amLODIPine 10 mg oral tablet [TK 1 T PO QD]    losartan 100 mg oral tablet [take 1 tablet (100 mg) by oral route once daily]    ASPIRIN 81MG CHEWABLE TABLETS  [CHEW AND SWALLOW 1 TABLET EVERY DAY]    HYDROCHLOROTHIAZIDE 12.5MG CAPSULES  [TAKE 1 CAPSULE BY MOUTH DAILY]    METOPROLOL TARTRATE 25MG TABLETS  [TAKE 1 TABLET BY MOUTH TWICE DAILY]    FIRST-OMEPRA YADIRA 2MG/ML         Objective:        Vitals:         Current: 4/7/2021 10:51:53 AM    Ht:  5 ft, 9 in;  Wt: 168.2 lbs;  BMI: 24.8T: 97.6 F (temporal);  BP: 107/55 mm Hg (left arm, sitting);  P: 72 bpm (left arm (BP Cuff), sitting);  sCr: 1.32 mg/dL;  GFR: 45.38        Exams:     PHYSICAL EXAM:     GENERAL: Vitals recorded well developed, well nourished;  no apparent distress;     EYES: left lid lag;     NECK: range of motion is normal;     RESPIRATORY: normal appearance and symmetric expansion of chest wall; normal respiratory rate and pattern with no distress; normal breath sounds with no rales, rhonchi, wheezes or rubs;     CARDIOVASCULAR: normal rate; rhythm is regular;  no edema;     GASTROINTESTINAL: nontender; normal bowel sounds; no organomegaly;     LYMPHATIC: no  enlargement of cervical or facial nodes; no supraclavicular nodes;     MUSCULOSKELETAL: normal gait; normal range of motion of all major muscle groups; pain with range of motion in: neck forward flexion and extension;  muscle strength: left side with slightly decreased strenght, some gait disturbance;     NEUROLOGIC: mental status: alert and oriented x 3;     PSYCHIATRIC: appropriate affect and demeanor; normal speech pattern; normal thought and perception;         Assessment:         I63.50   Cerebral infarction due to unspecified occlusion or stenosis of unspecified cerebral artery       E87.6   Hypokalemia       E03.2   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       M54.2   Cervicalgia       R13.10   Dysphagia, unspecified           ORDERS:         Meds Prescribed:       [Refilled] rivaroxaban 20 mg oral tablet [take 1 tablet (20 mg) by oral route once daily with the evening meal], #90 (ninety) tablets, Refills: 1 (one)       [Refilled] ezetimibe 10 mg oral tablet [take 1 tablet (10 mg) by oral route once daily], #90 (ninety) tablets, Refills: 1 (one)         Radiology/Test Orders:       [id number]  Radiologic examination, spine, cervical; minimum of four views  (Send-Out)              Lab Orders:       [id number]  TSH - [facility] TSH  (Send-Out)            [id number]  COMP - [facility] Comp. Metabolic Panel  (Send-Out)                      Plan:         Cerebral infarction due to unspecified occlusion or stenosis of unspecified cerebral artery        RECOMMENDATIONS given include: He will follow up with stroke center as recommended/scheduled  and follow up in our office in 6 weeks .  He declines PT at this time.  We will find out what needs to be done on the Zetia for refills - He has acute liver failure with the statins and will need to take Zetia.            Prescriptions:       [Refilled] rivaroxaban 20 mg oral tablet [take 1 tablet (20 mg) by oral route once daily with the evening meal], #90 (ninety) tablets,  Refills: 1 (one)       [Refilled] ezetimibe 10 mg oral tablet [take 1 tablet (10 mg) by oral route once daily], #90 (ninety) tablets, Refills: 1 (one)         Hypokalemia    LABORATORY:  Labs ordered to be performed today include Comprehensive metabolic panel.            Orders:       [id number]  COMP - [facility] Comp. Metabolic Panel  (Send-Out)              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He will remain off the alternating doses and take only the 137mcg daily.  We will get baseline today and follow up in 4 weeks.     LABORATORY:  Labs ordered to be performed today include TSH.            Orders:       [id number]  TSH - [facility] TSH  (Send-Out)              CervicalgiaDo  not feel that the pain is from the stenosis - think this is a result of his RA/chronic long term back pain  - will get xray and consider MRI/ referral for Pain management vs neurosurgery         RADIOLOGY:  I have ordered a C-Spine x-ray series to be done today.            Orders:       [id number]  Radiologic examination, spine, cervical; minimum of four views  (Send-Out)              Dysphagia, unspecifiedI have discussed with Mr. Amador the risks associated with dysphagia and risk of aspiration pneumonia - this will result in aspiration pneumonia and often results in death.  He is aware of the risks of not proceeding with feeding tube and declines speech therapy at this time.  Will continue to monitor            Patient Recommendations:        For  Cerebral infarction due to unspecified occlusion or stenosis of unspecified cerebral artery:    I also recommend He will follow up with stroke center as recommended/scheduled  and follow up in our office in 6 weeks .  He declines PT at this time.  We will find out what needs to be done on the Zetia for refills - He has acute liver failure with the statins and will need to take Zetia.              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

## 2021-05-18 NOTE — PROGRESS NOTES
Dixon Amador 1945     Office/Outpatient Visit    Visit Date: Thu, Sep 26, 2019 02:11 pm    Provider: Ilir Mireles MD (Assistant: Corine Small MA)    Location: [facility]        Electronically signed by Ilir Mireles MD on  09/26/2019 08:34:40 PM                             SUBJECTIVE:        CC:     Mr. Amador is a 73 year old White male.  This is a follow-up visit.  check up         HPI: Pt reports he is doing well.     Levothyroxine 137 mcg/day. TSH has been lower end of normal in March and July 2019.     BP today is 125/63 with a HR of 60. He is on lisinopril 40 mg qd, metoprolol 100 mg qd, HCTZ 12.5 mg qd, and amlodipine 5 mg qd.     Pt has shortness of breath if walking on level ground for 10 minutes or piddling in the garage for 10 minutes. No orthopnea, no swelling. No change in Sx since last visit. He had labs for oncologist Dr. Pantoja and BNP was elevated. ECHO 8/22/19: Normal LV wall motion, EF 50%, mildly concentric LVH, mild aortic valve sclerosis, mild mitral regurg.     He has some left shoulder pain for a year, comes and goes. Pain is distal shoulder and down the arm. Pain is worse with abduction. He can forward flexion and extension are OK.     Pt has pain in left shoulder and all of his PIP joints, worse on right. Fingers are stiff in the morning and gets better throughout the day.     ROS:     CONSTITUTIONAL:  Negative for fatigue and fever.      E/N/T:  Negative for diminished hearing and nasal congestion.      CARDIOVASCULAR:  Negative for chest pain, orthopnea and palpitations.      RESPIRATORY:  Positive for dyspnea ( with moderate exertion ).   Negative for recent cough.      GASTROINTESTINAL:  Positive for constipation ( resolved ).   Negative for abdominal pain, diarrhea, nausea or vomiting.      MUSCULOSKELETAL:  Positive for arthralgias and (left shoulder) back pain.   Negative for myalgias.      NEUROLOGICAL:  Negative for headaches and weakness.       PSYCHIATRIC:  Positive for insomnia.   Negative for anxiety or depression.          PMH/FMH/SH:     Last Reviewed on 9/26/2019 08:31 PM by Ilir Mireles    Past Medical History:             PAST MEDICAL HISTORY         Hypertension     Pneumonia     Hypothyroidism: after radiation;     tonsil CA - chemo and radiation - 11 years ago - now cleared     surgery for busted blood vessel         CURRENT MEDICAL PROVIDERS:    Oncologist: Dr. Dr. Pantoja in [facility]             COLORECTAL CANCER SCREENING: Up to date (colonoscopy q10y; sigmoidoscopy q5y; Cologuard q3y) was last done patient thinks it in 2014; colonoscopy with normal results; Dr. Benitez         Surgical History:         throat cancer resection in 2008;    Back surgery X 2 in 1987;         Family History:     Father: Cause of death was MI     Mother: Healthy     Paternal Grandfather: Cause of death was MI     Maternal Grandfather: Cause of death was MI         Social History:     Occupation: Retired (Prior occupation: [employer])     Marital Status:  ( and remarried)     Children: 1 step-child         Tobacco/Alcohol/Supplements:     Last Reviewed on 9/26/2019 08:31 PM by Ilir Mireles    Tobacco: He has a past history of cigarette smoking; quit date:  1999.          Alcohol: Frequency: Socially         Substance Abuse History:     Last Reviewed on 9/26/2019 08:31 PM by Ilir Mireles    None         Mental Health History:     Last Reviewed on 9/26/2019 08:31 PM by Ilir Mireles    NEGATIVE         Communicable Diseases (eg STDs):     Last Reviewed on 9/26/2019 08:31 PM by Ilir Mireles    Reportable health conditions; NEGATIVE             Current Problems:     Last Reviewed on 9/26/2019 08:31 PM by Ilir Mireles    Polyarthralgia     Constipation     Acquired hypothyroidism, other iatrogenic cause     Osteopenia     Patient visit for long term (current) drug use; other     Benign HTN      Syncope     Polycythemia vera     Acquired hypothyroidism, other specified cause     Chronic low back pain     Carcinoma of throat     Hypertensive urgency     Shoulder pain     Shortness of breath     Insomnia         Immunizations:     Fluzone (3 + years dose) 11/1/2017     Fluad pf 9/4/2019     Influenza Virus Vaccine, unspecified formulation 11/1/2018     PNEUMOVAX 23 (Pneumococcal PPV23) 3/28/2019         Allergies:     Last Reviewed on 9/26/2019 08:31 PM by Ilir Mireles      No Known Drug Allergies.     Atorvastatin Calcium: Liver function abnormalities (Adverse Reaction)        Current Medications:     Last Reviewed on 9/26/2019 08:31 PM by lIir Mireles    Hydrochlorothiazide (HCTZ) 12.5mg Tablet Take 1 tablet(s) by mouth daily     Levothyroxine Sodium 0.137mg Tablet Take 1 tablet by mouth daily on an empty stomach     Amlodipine  5mg Tablet Take 1 tablet daily for Hypertension     Lisinopril 40mg Tablet 1 tab daily     Metoprolol Succinate 100mg Tablets, Extended Release Take 1 tablet by mouth daily     Senna 8.6mg Tablet 1 tab daily     Trazodone HCl 50mg Tablet 1 - 2 @ QHS PRN     Melatonin         OBJECTIVE:        Vitals:         Current: 9/26/2019 2:16:46 PM    Ht:  5 ft, 9 in;  Wt: 129.4 lbs;  BMI: 19.1    T: 98.4 F (oral);  BP: 125/63 mm Hg (left arm, sitting);  P: 60 bpm (left arm (BP Cuff), sitting);  sCr: 0.91 mg/dL;  GFR: 60.63        Exams:     PHYSICAL EXAM:     GENERAL: Vitals recorded well developed, well nourished, thin;  well groomed;  no apparent distress;     E/N/T: OROPHARYNX:  normal mucosa, dentition, gingiva, and posterior pharynx;     RESPIRATORY: normal respiratory rate and pattern with no distress; normal breath sounds with no rales, rhonchi, wheezes or rubs;     CARDIOVASCULAR: normal rate; rhythm is regular;  no systolic murmur; no edema;     GASTROINTESTINAL: nontender; normal bowel sounds; no masses;     MUSCULOSKELETAL: normal gait; pain with range of motion in: left  shoulder abduction;  muscle strength: 5/5 in all major muscle groups;  normal overall tone swelling PIPs bilaterally;     NEUROLOGIC: mental status: alert and oriented x 3;     PSYCHIATRIC:  appropriate affect and demeanor; normal speech pattern; grossly normal memory;         Lab/Test Results:         LABORATORY RESULTS: PFTs performed by tls         Procedures:     Shortness of breath         Nebulizer: Medication: Albuterol Post-treatment Pulse:  58; O2 Sat: 100 Lot# [id number]  Expiration: 10/2020 Administered by: tls             ASSESSMENT           244.3   E03.2  Acquired hypothyroidism, other iatrogenic cause              DDx:     401.1   I10  Benign HTN              DDx:     786.09   R06.02  Shortness of breath              DDx:     719.41   M25.512  Shoulder pain              DDx:     719.49   M25.512   M79.644   M79.645  Polyarthralgia              DDx:         ORDERS:         Radiology/Test Orders:       [id number]  Left Radiologic exam, shoulder; comp, 2 views  (Send-Out)           Lab Orders:       [id number]  RAPII - [facility] Arthritis Profile  (Send-Out)         [id number]  BDCBC - [facility] CBC with 3 part diff  (Send-Out)         43535  COMP - [facility] Comp. Metabolic Panel  (Send-Out)         18317  TSH - [facility] TSH  (Send-Out)         APPTO  Appointment need  (In-House)           Procedures Ordered:       REFER  Referral to Specialist or Other Facility  (Send-Out)         63748  Bronchodilation responsiveness, [place]irometry as in [address], pre- and post bronchodilator administration  (In-House)           Other Orders:         Albuterol, inhale soln, FDA-apprvd final, non-compounded, admin thru DME, unit dose 1 mg (x2.5)                 PLAN:          Acquired hypothyroidism, other iatrogenic cause Cont levothyroxine and will check TSH.          Benign HTN BP very elevated earlier this year and is now normal with medication adjustment. Cont lisinopril 40 mg qd, metoprolol 100 mg qd, HCTZ 12.5 mg qd, and amlodipine 5 mg qd.      LABORATORY:  Labs ordered to be performed today include CBC, Comprehensive metabolic panel, and TSH.            Orders:       [id number]  BDCBC - [facility] CBC with 3 part diff  (Send-Out)         46388  COMP - [facility] Comp. Metabolic Panel  (Send-Out)         96039  TSH - [facility] TSH  (Send-Out)            Shortness of breath PFTs performed in clinic and pt referred to cardiology to assess for heart failure.         TESTS/PROCEDURES:  Will proceed with Pre and Post PFT with bronchodilator administration. to be performed/scheduled now.      REFERRALS:  Referral initiated to a cardiologist ( Dr. Dixon Vela, [facility] ).            Orders:       REFER  Referral to Specialist or Other Facility  (Send-Out)         81133  Bronchodilation responsiveness, [place]rom[place] in [address], pre- and post bronchodilator administration  (In-House)                     Albuterol, inhale soln, FDA-apprvd final, non-compounded, admin thru DME, unit dose 1 mg (x2.5)          Shoulder pain Pt will RTC in 2 weeks for steroid shot.         RADIOLOGY:  I have ordered Shoulder x-ray: left shoulder to be done today.      FOLLOW-UP: Schedule a follow-up appointment in 2 weeks.:.            Orders:       [id number]  Left Radiologic exam, shoulder; comp, 2 views  (Send-Out)         APPTO  Appointment need  (In-House)            Polyarthralgia Polyarthralgia with Pipo nodes bilaterally is concerning for RA. Labs ordered to assess.     LABORATORY:  Labs ordered to be performed today include Arthritis Profile.            Orders:       [id number]  [facility] Arthritis Profile  (Send-Out)               Patient Recommendations:        For  Shoulder pain:     Schedule a follow-up visit in 2 weeks.                APPOINTMENT INFORMATION:        Monday Tuesday Wednesday Thursday Friday Saturday Sunday            Time:___________________AM  PM   Date:_____________________         For  Polyarthralgia:     I also recommend ^.              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              Orders:          52515   Office/outpatient visit; established patient, level 4  (In-House)           401.1 Benign HTN            I10    Essential (primary) hypertension    786.09 Shortness of breath            R06.02    Shortness of breath              Orders:          18777   Bronchodilation responsiveness[place] in [address], pre- and post bronchodilator administration  (In-House)                                           Albuterol, inhale soln, FDA-apprvd final, non-compounded, admin thru DME, unit dose 1 mg (x2.5)         719.41 Shoulder pain            M25.512    Pain in left shoulder              Orders:          APPTO   Appointment need  (In-House)           719.49 Polyarthralgia            M25.512    Pain in left shoulder           M79.644    Pain in right finger(s)           M79.645    Pain in left finger(s)

## 2021-05-18 NOTE — PROGRESS NOTES
Dixon Amador  1945     Office/Outpatient Visit    Visit Date: Thu, May 21, 2020 08:26 am    Provider: Ilir Mireles MD (Assistant: Angie Yan MA)    Location: [facility]        Electronically signed by Ilir Mireles MD on  05/21/2020 01:03:34 PM                             Subjective:        CC: Long is a 74 year old White male.  follow up. Arthritis pain. phone [phone]         HPI:       Pain worse in left hand and to lesser extent in right hand and ankles, ankles are also swollen. He has an apt on 6/23/20 with Dr. Fry.     Pt reports his joints are hurting worse recently. Pain in both his hands that makes it hard to use them. Worse in right knuckles (PIP). Pt has a h/o rheumatoid arthritis, this was Dx'd about 5 years ago by Beau in [facility]. He has never been treated for this due to paucity of Sx. Labs show elevated rheumatoid factor. ESR and CRP are normal. He has polyarthralgia and joint stiffness in the morning (hands, ankles, both shoulders, and lower back), this gets better throughout the day. I gave pt a steroid shot in left shoulder he reports this was a miracle cure, his shoulder pain improved dramatically.      Pt checks BP about every 3-4 days and its been 137/81; 163/86; and 147/84. HR is often in the 60s. It was 180/80 or 170/70 prior to our visit on 4/8/20. At our last visit lisinopril 40 mg was changed to losartan 100 mg qd and HCTZ 12.5 mg qd was increased to 25 mg qd. He is also on metoprolol 100 mg qd and amlodipine 10 mg qd.    ROS:     CONSTITUTIONAL:  Negative for fatigue and fever.      E/N/T:  Negative for diminished hearing and nasal congestion.      CARDIOVASCULAR:  Negative for chest pain, orthopnea and palpitations.      RESPIRATORY:  Positive for persistent cough ( typically dry ) and dyspnea ( with moderate exertion ).      GASTROINTESTINAL:  Positive for constipation ( resolved ).   Negative for abdominal pain, diarrhea, nausea or vomiting.       MUSCULOSKELETAL:  Positive for arthralgias and (left shoulder, both hands, getting worse) back pain.   Negative for myalgias.      NEUROLOGICAL:  Negative for headaches and weakness.      PSYCHIATRIC:  Positive for insomnia.   Negative for anxiety or depression.          Past Medical History / Family History / Social History:         Last Reviewed on 5/21/2020 01:02 PM by Ilir Mireles    Past Medical History:             PAST MEDICAL HISTORY         Hypertension     Pneumonia     Hypothyroidism: after radiation;     tonsil CA - chemo and radiation - 11 years ago - now cleared     surgery for busted blood vessel         CURRENT MEDICAL PROVIDERS:    Oncologist: Dr. Dr. Pantoja in [facility]             COLORECTAL CANCER SCREENING: Up to date (colonoscopy q10y; sigmoidoscopy q5y; Cologuard q3y) was last done patient thinks it in 2014; colonoscopy with normal results; Dr. Benitez         Surgical History:         throat cancer resection in 2008;    Back surgery X 2 in 1987;         Family History:     Father: Cause of death was MI     Mother: Healthy     Paternal Grandfather: Cause of death was MI     Maternal Grandfather: Cause of death was MI         Social History:     Occupation: Retired (Prior occupation: [employer])     Marital Status:  ( and remarried)     Children: 1 step-child         Tobacco/Alcohol/Supplements:     Last Reviewed on 5/21/2020 01:02 PM by Ilir Mireles    Tobacco: He has a past history of cigarette smoking; quit date:  1999.          Alcohol: Frequency: Socially         Substance Abuse History:     Last Reviewed on 5/21/2020 01:02 PM by Ilir Mireles    None         Mental Health History:     Last Reviewed on 5/21/2020 01:02 PM by Ilir Mireles    NEGATIVE         Communicable Diseases (eg STDs):     Last Reviewed on 5/21/2020 01:02 PM by Ilir Mireles    Reportable health conditions; NEGATIVE         Current Problems:     Last  Reviewed on 5/21/2020 01:02 PM by Ilir Mireles    Malignant neoplasm of pharynx, unspecified    Polycythemia vera    Hypothyroidism, unspecified    Other specified anxiety disorders    Syncope and collapse    Radiculopathy, lumbar region    Essential (primary) hypertension    Primary insomnia    Other specified disorders of bone, multiple sites    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    Constipation, unspecified    Shortness of breath    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, right hand    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, left hand    Encounter for screening for malignant neoplasm of prostate    Cough    Other long term (current) drug therapy        Immunizations:     Fluzone (3 + years dose) 11/1/2017    Fluad pf 9/4/2019    Influenza Virus Vaccine, unspecified formulation 11/1/2018    PNEUMOVAX 23 (Pneumococcal PPV23) 3/28/2019        Allergies:     Last Reviewed on 5/21/2020 01:02 PM by Ilir Mireles    Atorvastatin Calcium: Liver function abnormalities  (Adverse Reaction)    LISINOPRIL 40MG TABLETS: Dry cough  (Adverse Reaction)        Current Medications:     Last Reviewed on 5/21/2020 01:02 PM by Ilir Mireles    levothyroxine 112 mcg oral tablet [alternate between 112 and 125 mcg tabs every other day; take 1 tablet mouth on an empty stomach once daily]    Norco 7.5-325 mg oral tablet [1 tab PO daily prn pain]    Melatonin     traZODone 50 mg oral tablet [TAKE 1 TO 2 TABLETS BY MOUTH EVERY NIGHT AT BEDTIME AS NEEDED]    hydroCHLOROthiazide 25 mg oral tablet [take 1 tablet (25 mg) by oral route once per day]    Vitamin B-12 500 mcg oral tablet [1 tab daily]    METOPROL SUC TAB 100MG ER     AMLODIPINE BESYLATE 10MG TABLETS  [TK 1 T PO QD]    losartan 100 mg oral tablet [take 1 tablet (100 mg) by oral route once daily]        Assessment:         M06.042   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, left hand       I10   Essential (primary)  hypertension       Z79.899   Other long term (current) drug therapy           ORDERS:         Meds Prescribed:       [New Rx] hydroxychloroquine 200 mg oral tablet [take 1 tablet (200 mg) by oral route 2 times per day], #30 (thirty) tablets, Refills: 2 (two)       [Refilled] Norco 7.5-325 mg oral tablet [1 tab PO daily prn pain], #30 (thirty) tablets, Refills: 0 (zero)         Lab Orders:       [id number]  Drug test prsmv qual dir optical obs per day  (In-House)                      Plan:         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, left handPt has worsening arthritis in hands and ankles in setting of rheumatoid arthritis. He has an apt with Angeli Metha on 6/23 so plaquenil 200 mg qd is prescribed today. Pt advised to schedule an apt with optometry for vision check. Since plaquenil may be on back order he is given a refill of norco 7.5 mg qd for joint pain. No concern for abuse or diversion.    Telehealth: Verbal consent obtained for visit to occur via phone call; Staff, other than provider, present during telephone visit include Angie Yan; Total time spent was 21 minutes; [id number]--Lsneszyso E/M 11-20 minutes Controlled substance documentation: Ihsan reviewed; prior drug screen consistent; consent is reviewed and signed and on the chart.  He is aware of risk of addiction on this medication, understands that he will need to follow up for a review every 3 months and his medications will be adjusted or decreased as deemed appropriate at each visit.  No history of drug or alcohol abuse.  No concerns about diversion or abuse. He denies side effects related to the medication.  He is aware that he may be called in for pill counts.  The dosing of this medication will be reviewed on a regular basis and reduced if possible..  Ongoing use of a controlled substance is necessary for this patient to have a normal quality of life           Prescriptions:       [New Rx] hydroxychloroquine 200 mg oral tablet [take 1 tablet  (200 mg) by oral route 2 times per day], #30 (thirty) tablets, Refills: 2 (two)       [Refilled] Norco 7.5-325 mg oral tablet [1 tab PO daily prn pain], #30 (thirty) tablets, Refills: 0 (zero)         Essential (primary) hypertensionBP has improved and we may need to increase losartan to BID at next visit. For now, cont losartan 100 mg qd, metoprolol 100 mg qd, HCTZ 25 mg qd, and amlodipine 10 mg qd.        Other long term (current) drug therapy    LABORATORY:  Labs ordered to be performed today include Drug screen.            Orders:       [id number]  Drug test prsmv qual dir optical obs per day  (In-House)                  Charge Capture:         Primary Diagnosis:     M06.042  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, left hand           Orders:      [id number]  Phys/QHP telephone evaluation 11-20 minutes  (In-House)              I10  Essential (primary) hypertension     Z79.899  Other long term (current) drug therapy           Orders:      [id number]  Drug test prsmv qual dir optical obs per day  (In-House)

## 2021-05-18 NOTE — PROGRESS NOTES
Dixon Amador 1945     Office/Outpatient Visit    Visit Date: Thu, Mar 28, 2019 02:49 pm    Provider: Ilir Mireles MD (Assistant: Trinidad Granados MA)    Location: [facility]        Electronically signed by Ilir Mireles MD on  03/28/2019 06:06:24 PM                             SUBJECTIVE:        HPI:         Mr. Amador is here for a Medicare wellness visit.  ADVANCE DIRECTIVES (Please update in PM): Living will at [facility]; [place] [facility] health checkup, the required HRA questions are integrated within this visit note. Family medical history and individual medical/surgical history were reviewed and updated.  A current height, weight, BMI, blood pressure, and pulse were recorded in the vitals section of the note and have been reviewed. Patient's medications, including supplements, were recorded in the chart and reviewed.  Current providers and suppliers were reviewed and updated.          Self-Assessment of Health: He rates his health as fair. He rates his confidence of being able to control/manage most of his health problems as very confident. His physical/emotional health has limited his social activites not at all.  A review of cognitive impairment was performed, including ability to drive a car, manage finances, and any memory changes, and was found to be negative.  A review of functional ability, including bathing, dressing, walking, and urine/bowel continence as well as level of safety was performed and was found to be negative.  Falls Risk: Has not had any falls or only one fall without injury in the past year.  He denies having trouble hearing the TV/radio when others do not, having to strain to hear or understand conversations and wearing hearing aid(s).  Concerning home safety, he reports that at home he DOES have adequate lighting, a skid resistant shower/tub, grab bars in the bath, functioning smoke alarms and absence of throw rugs.          Immunization  Status: ** Has not received pneumococcal vaccination; ** Has not received Prevnar 13 vaccination; Age>60, no shingles vaccination; pt unsure of last tetanus; Physical Activity: He exercises for at least 20 minutes 3 or more days/week.; Type of diet patient normally eats is described as well-balanced with fruits and vegetables Tobacco: Past history of cigarette smoking, but has quit.  Preventative Health updated today.          PHQ-9 Depression Screening: Completed form scanned and in chart; Total Score 3 Alcohol Consumption Screening: Completed form scanned and in chart; Total Score 0     BP is still very high today. Pt is on metoprolol 50 mg qd and lisinopril 40 mg qd. He checks it at home and its usually high there too.     x-rayof lumbar spine showed osteopenia, has never had a DEXA scan     Pt reports he's restless at night, wakes up a lot.     ROS:     CONSTITUTIONAL:  Negative for fatigue and fever.      EYES:  Negative for blurred vision.      E/N/T:  Negative for diminished hearing and nasal congestion.      CARDIOVASCULAR:  Negative for chest pain and palpitations.      RESPIRATORY:  Negative for recent cough and dyspnea.      GASTROINTESTINAL:  Negative for abdominal pain, constipation, diarrhea, nausea and vomiting.      MUSCULOSKELETAL:  Positive for back pain.   Negative for arthralgias or myalgias.      NEUROLOGICAL:  Negative for headaches and weakness.      PSYCHIATRIC:  Positive for insomnia.   Negative for anxiety or depression.          PMH/FMH/SH:     Last Reviewed on 3/28/2019 06:03 PM by Ilir Mireles    Past Medical History:             PAST MEDICAL HISTORY         Hypertension     Pneumonia     Hypothyroidism: after radiation;     tonsil CA - chemo and radiation - 11 years ago - now cleared     surgery for busted blood vessel         CURRENT MEDICAL PROVIDERS:    Oncologist: Dr. Dr. Pantoja in [facility]             COLORECTAL CANCER SCREENING: Up to date  (colonoscopy q10y; sigmoidoscopy q5y; Cologuard q3y) was last done patient thinks it in 2014; colonoscopy with normal results; Dr. Benitez         Surgical History:         Throat surgery for CA;    Back surgery X 2 in 1987;         Family History:     Father: Cause of death was MI     Mother: Healthy     Paternal Grandfather: Cause of death was MI     Maternal Grandfather: Cause of death was MI         Social History:     Occupation: Retired (Prior occupation: [employer])     Marital Status:  ( and remarried)     Children: 1 step-child         Tobacco/Alcohol/Supplements:     Last Reviewed on 3/28/2019 06:03 PM by Ilir Mireles    Tobacco: He has a past history of cigarette smoking; quit date:  1999.          Alcohol: Frequency: Socially         Substance Abuse History:     Last Reviewed on 3/28/2019 06:03 PM by Ilir Mireles    None         Mental Health History:     Last Reviewed on 3/28/2019 06:03 PM by Iilr Mireles    NEGATIVE         Communicable Diseases (eg STDs):     Last Reviewed on 3/28/2019 06:03 PM by Ilir Mireles    Reportable health conditions; NEGATIVE             Current Problems:     Last Reviewed on 3/28/2019 06:03 PM by Ilir Mireles    Osteopenia     Patient visit for long term (current) drug use; other     Benign HTN     Syncope     Polycythemia vera     Acquired hypothyroidism, other specified cause     Chronic low back pain     Carcinoma of throat     Hypertensive urgency     Insomnia     Screening for cardiovascular conditions     Screening for depression         Immunizations:     Fluzone (3 + years dose) 11/1/2017     Influenza Virus Vaccine, unspecified formulation 11/1/2018     PNEUMOVAX 23 (Pneumococcal PPV23) 3/28/2019         Allergies:     Last Reviewed on 3/28/2019 06:03 PM by Ilir Mireles      No Known Drug Allergies.     Atorvastatin Calcium: Liver function abnormalities (Adverse Reaction)        Current Medications:     Last Reviewed on  3/28/2019 06:03 PM by Ilir Mireles    Metoprolol Succinate 50mg Tablets, Extended Release Take 1 tablet(s) by mouth BID     Levothyroxine Sodium 0.15mg Tablet Take 1 tablet(s) by mouth daily     Lisinopril 40mg Tablet 1 tab daily     Norco 7.5mg/325mg Tablet 1 tab PO daily prn pain         OBJECTIVE:        Vitals:         Current: 3/28/2019 3:01:25 PM    Ht:  5 ft, 9 in;  Wt: 138.4 lbs;  BMI: 20.4    T: 98.6 F (oral);  BP: 185/89 mm Hg (left arm, sitting);  P: 67 bpm (left arm (BP Cuff), sitting);  sCr: 0.96 mg/dL;  GFR: 59.14    VA: 20/40 OD, 20/70 OS (near, without correction)        Repeat:     3:06:26 PM     BP:   162/76mm Hg (right arm, sitting)         Exams:     PHYSICAL EXAM:     GENERAL: Vitals recorded well developed, well nourished, thin;  well groomed;  no apparent distress;     E/N/T: OROPHARYNX:  normal mucosa, dentition, gingiva, and posterior pharynx;     RESPIRATORY: normal respiratory rate and pattern with no distress; normal breath sounds with no rales, rhonchi, wheezes or rubs;     CARDIOVASCULAR: normal rate; rhythm is regular;  no systolic murmur; no edema;     GASTROINTESTINAL: nontender; normal bowel sounds;     MUSCULOSKELETAL: normal gait; normal overall tone     NEUROLOGIC: mental status: alert and oriented x 3;     PSYCHIATRIC:  appropriate affect and demeanor; normal speech pattern; grossly normal memory;         ASSESSMENT           V70.0   Z00.00  Health checkup              DDx:     V79.0   Z13.89  Screening for depression              DDx:     401.1   I10  Benign HTN              DDx:     V03.82   Z23  Pneumovax administration              DDx:     733.90   M89.8X0  Osteopenia              DDx:     V81.2   Z13.6  Screening for cardiovascular conditions              DDx:     307.41   F51.01  Insomnia              DDx:         ORDERS:         Meds Prescribed:       Amlodipine  5mg Tablet Take 1 tablet daily for Hypertension  #30 (Thirty) tablet(s) Refills: 2       Melatonin 5mg  Tablet Take 1 tablet(s) by mouth at bedtime  #30 (Thirty) tablet(s) Refills: 2         Radiology/Test Orders:       [id number]  DXA, bone density study, 1 or more sites; axial skeleton (eg hips, pelvis, spine)  (Send-Out)         37471   abdominal aorta real time screen study AAA  (Send-Out)           Procedures Ordered:       [id number]  PNEUMOVAX 23  (In-House)           Other Orders:         Depression screen negative  (In-House)         1101F  Pt screen for fall risk; document no falls in past year or only 1 fall w/o injury in past year (MEERA)  (In-House)           Negative EtOH screen  (In-House)           Administration of pneumococcal vaccine (x1)                 PLAN:          Health checkup Pt is overall in good health. His main health concerns are chronic back pain, severe HTN, and osteopenia. He is UTD on most preventative health screenings, colonoscopy records requested.          Screening for depression     MIPS PHQ-9 Depression Screening Completed form scanned and in chart; Total Score 3 Negative alcohol screen           Orders:         Depression screen negative  (In-House)         1101F  Pt screen for fall risk; document no falls in past year or only 1 fall w/o injury in past year (MEERA)  (In-House)           Negative EtOH screen  (In-House)            Benign HTN Amlodipine 5 mg is added to lisinopril 40 mg and metoprolol 50 mg qd. BP remains very high today.           Prescriptions:       Amlodipine  5mg Tablet Take 1 tablet daily for Hypertension  #30 (Thirty) tablet(s) Refills: 2          Pneumovax administration           Orders:       [id number]  PNEUMOVAX 23  (In-House)                     Administration of pneumococcal vaccine (x1)          Osteopenia         RADIOLOGY:  I have ordered Dexa Scan to be done today.            Orders:       [id number]  DXA, bone density study, 1 or more sites; axial skeleton (eg hips, pelvis, spine)  (Send-Out)            Screening for cardiovascular  conditions         RADIOLOGY:  I have ordered Aorta Screening Ultrasound Medicare to be done today.            Orders:       [id number]   abdominal aorta real time screen study AAA  (Send-Out)            Insomnia           Prescriptions:       Melatonin 5mg Tablet Take 1 tablet(s) by mouth at bedtime  #30 (Thirty) tablet(s) Refills: 2             CHARGE CAPTURE           **Please note: ICD descriptions below are intended for billing purposes only and may not represent clinical diagnoses**        Primary Diagnosis:         V70.0 Health checkup            Z00.00    Encounter for general adult medical examination without abnormal findings              Orders:          [id number]   Preventive medicine, established patient, age 65+ years  (In-House)           V79.0 Screening for depression            Z13.89    Encounter for screening for other disorder              Orders:             Depression screen negative  (In-House)             1101F   Pt screen for fall risk; document no falls in past year or only 1 fall w/o injury in past year (MEERA)  (In-House)                Negative EtOH screen  (In-House)           401.1 Benign HTN            I10    Essential (primary) hypertension    V03.82 Pneumovax administration            Z23    Encounter for immunization              Orders:          [id number]   PNEUMOVAX 23  (In-House)                                           Administration of pneumococcal vaccine (x1)         733.90 Osteopenia            M89.8X0    Other specified disorders of bone, multiple sites    V81.2 Screening for cardiovascular conditions            Z13.6    Encounter for screening for cardiovascular disorders    307.41 Insomnia            F51.01    Primary insomnia

## 2021-05-18 NOTE — PROGRESS NOTES
Dixon Amador 1945     Office/Outpatient Visit    Visit Date: Mon, Aug 19, 2019 02:23 pm    Provider: Ilir Mireles MD (Assistant: Trinidad Granados MA)    Location: [facility]        Electronically signed by Ilir Mireles MD on  08/20/2019 07:16:13 AM                             SUBJECTIVE:        CC:     Mr. Amador is a 73 year old White male.  This is a follow-up visit.  one month         HPI:     Pt has hypothyoidism s/p surgical resection, radiation, and chemo for throat cancer in 2008. Pt needs refill of levothyroxine, which he is taking appropriately.     BP today is 173/75 with a HR of 55. He is on lisnopril 40 mg qd, metoprolol 50 mg qd, and amlodipine 5 mg qd. He checks BP seldom but its usually high. No headache or blurry vision.     Pt reports shortness of breath if walking on level ground for 10 minutes. He gets tired if piddling in the garage for 10 minutes. No orthopnea, no swelling. He had labs for oncologist Dr. Pantoja and BNP was elevated.     ROS:     CONSTITUTIONAL:  Negative for fatigue and fever.      E/N/T:  Negative for diminished hearing and nasal congestion.      CARDIOVASCULAR:  Negative for chest pain, orthopnea and palpitations.      RESPIRATORY:  Positive for dyspnea ( with moderate exertion ).   Negative for recent cough.      GASTROINTESTINAL:  Positive for constipation ( resolved ).   Negative for abdominal pain, diarrhea, nausea or vomiting.      MUSCULOSKELETAL:  Positive for back pain.   Negative for arthralgias or myalgias.      NEUROLOGICAL:  Negative for headaches and weakness.      PSYCHIATRIC:  Positive for insomnia.   Negative for anxiety or depression.          PMH/FMH/SH:     Last Reviewed on 8/20/2019 07:12 AM by Ilir Mireles    Past Medical History:             PAST MEDICAL HISTORY         Hypertension     Pneumonia     Hypothyroidism: after radiation;     tonsil CA - chemo and radiation - 11 years ago - now cleared     surgery for busted blood  vessel         CURRENT MEDICAL PROVIDERS:    Oncologist: Dr. Dr. Pantoja in [facility]             COLORECTAL CANCER SCREENING: Up to date (colonoscopy q10y; sigmoidoscopy q5y; Cologuard q3y) was last done patient thinks it in 2014; colonoscopy with normal results; Dr. Benitez         Surgical History:         throat cancer resection in 2008;    Back surgery X 2 in 1987;         Family History:     Father: Cause of death was MI     Mother: Healthy     Paternal Grandfather: Cause of death was MI     Maternal Grandfather: Cause of death was MI         Social History:     Occupation: Retired (Prior occupation: [employer])     Marital Status:  ( and remarried)     Children: 1 step-child         Tobacco/Alcohol/Supplements:     Last Reviewed on 8/20/2019 07:12 AM by Ilir Mireles    Tobacco: He has a past history of cigarette smoking; quit date:  1999.          Alcohol: Frequency: Socially         Substance Abuse History:     Last Reviewed on 8/20/2019 07:12 AM by Ilir Mireles    None         Mental Health History:     Last Reviewed on 8/20/2019 07:12 AM by Ilir Mireles    NEGATIVE         Communicable Diseases (eg STDs):     Last Reviewed on 8/20/2019 07:12 AM by Ilir Mireles    Reportable health conditions; NEGATIVE             Current Problems:     Last Reviewed on 8/20/2019 07:12 AM by Ilir Mireles    Constipation     Acquired hypothyroidism, other iatrogenic cause     Osteopenia     Patient visit for long term (current) drug use; other     Benign HTN     Syncope     Polycythemia vera     Acquired hypothyroidism, other specified cause     Chronic low back pain     Carcinoma of throat     Hypertensive urgency     Shortness of breath     Insomnia         Immunizations:     Fluzone (3 + years dose) 11/1/2017     Influenza Virus Vaccine, unspecified formulation 11/1/2018     PNEUMOVAX 23 (Pneumococcal PPV23) 3/28/2019         Allergies:     Last Reviewed on  8/20/2019 07:12 AM by Ilir Mireles      No Known Drug Allergies.     Atorvastatin Calcium: Liver function abnormalities (Adverse Reaction)        Current Medications:     Last Reviewed on 8/20/2019 07:12 AM by Ilir Mireles    Levothyroxine Sodium 0.15mg Tablet Take 1 tablet(s) by mouth daily     Amlodipine  5mg Tablet Take 1 tablet daily for Hypertension     Lisinopril 40mg Tablet 1 tab daily     Metoprolol Succinate 100mg Tablets, Extended Release Take 1 tablet by mouth daily     Senna 8.6mg Tablet 1 tab daily     Trazodone HCl 50mg Tablet 1 - 2 @ [facility] PRN     Melatonin         OBJECTIVE:        Vitals:         Current: 8/19/2019 2:29:22 PM    Ht:  5 ft, 9 in;  Wt: 130.6 lbs;  BMI: 19.3    T: 98.2 F (oral);  BP: 173/75 mm Hg (left arm, sitting);  P: 55 bpm (left arm (BP Cuff), sitting);  sCr: 0.91 mg/dL;  GFR: 60.87        Repeat:     2:55:08 PM     BP:   151/70mm Hg (left arm, sitting)         Exams:     PHYSICAL EXAM:     GENERAL: Vitals recorded well developed, well nourished, thin;  well groomed;  no apparent distress;     E/N/T: OROPHARYNX:  normal mucosa, dentition, gingiva, and posterior pharynx;     RESPIRATORY: normal respiratory rate and pattern with no distress; normal breath sounds with no rales, rhonchi, wheezes or rubs;     CARDIOVASCULAR: normal rate; rhythm is regular;  no systolic murmur; no edema;     GASTROINTESTINAL: nontender; normal bowel sounds; no masses;     MUSCULOSKELETAL: normal gait; muscle strength: 5/5 in all major muscle groups;  normal overall tone     NEUROLOGIC: mental status: alert and oriented x 3;     PSYCHIATRIC:  appropriate affect and demeanor; normal speech pattern; grossly normal memory;         ASSESSMENT           244.3   E03.2  Acquired hypothyroidism, other iatrogenic cause              DDx:     401.1   I10  Benign HTN              DDx:     786.09   R06.02  Shortness of breath              DDx:         ORDERS:         Meds Prescribed:       Hydrochlorothiazide  (HCTZ) 12.5mg Tablet Take 1 tablet(s) by mouth daily  #30 (Thirty) tablet(s) Refills: 2       Refill of: Levothyroxine Sodium 0.137mg Tablet Take 1 tablet by mouth daily on an empty stomach  #90 (Ninety) tablet(s) Refills: 2         Radiology/Test Orders:       [id number]  Echocardiography, transthoracic, real-time w image (2D), w M-mode, w spectral & color flow Doppler  (Send-Out)           Lab Orders:       APPTO  Appointment need  (In-House)                   PLAN:          Acquired hypothyroidism, other iatrogenic cause Levothyroxine reduced from 150 to 137 mcg/day 2/2 TSH either low or on lower end of normal.         FOLLOW-UP: Schedule a follow-up appointment in 6 weeks..            Prescriptions:       Refill of: Levothyroxine Sodium 0.137mg Tablet Take 1 tablet by mouth daily on an empty stomach  #90 (Ninety) tablet(s) Refills: 2           Orders:       APPTO  Appointment need  (In-House)            Benign HTN HCTZ 12.5 mg qd added to lisnopril 40 mg qd, metoprolol 50 mg qd, and amlodipine 5 mg qd. Pt had old bottle of lisinopril 10 mg so its possible he was taking 10 mg instead of 40 to explain his HTN today. Also, metoprolol bottle still said 50 m BID instead of 100 mg qd so its unclear what dosage he is  actually taking there either but HR is normal/low. Pt counseled to continue bringing medication to each visit.           Prescriptions:       Hydrochlorothiazide (HCTZ) 12.5mg Tablet Take 1 tablet(s) by mouth daily  #30 (Thirty) tablet(s) Refills: 2          Shortness of breath ECHO ordered for shortness of breath with exertion in setting of long standing HTN.         TESTS/PROCEDURES:  Will proceed with ECHO to be performed/scheduled now.            Orders:       [id number]  Echocardiography, transthoracic, real-time w image (2D), w M-mode, w spectral & color flow Doppler  (Send-Out)               Patient Recommendations:        For  Acquired hypothyroidism, other iatrogenic cause:     Schedule a follow-up visit  in 6 weeks.                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

## 2021-06-05 NOTE — PROGRESS NOTES
"Dixon Amador  1945     Office/Outpatient Visit    Visit Date: Mon, May 17, 2021 09:59 am    Provider: Shayy Galaviz N.P. (Assistant: Corine Small MA)    Location: [facility]        Electronically signed by Shayy Galaviz N.P. on  05/17/2021 01:43:07 PM                             Subjective:        CC: Long is a 75 year old White male.  This is a follow-up visit.  \"I had a stroke about a month ago\"  check up on BP / arthritis pt states he is not taking omeprazole         HPI: 5/27/21          Patient to be evaluated for essential (primary) hypertension.  This was first diagnosed several years ago.  Compliance with treatment has been good.  He is tolerating the medication well without side effects.  He did not bring his blood pressure diary, but says that pressures have been okay.  blood pressure has been ok  no concerns           Concerning cervicalgia, the location of discomfort is posterior.  The pain is characterized as severe.  Initial onset was several months ago.  There was no obvious precipitating event or injury.  Medical history is pertinent for rheumatoid arthritis and significant chronic changes in the Cspine noted on recent xray from 4/2021  Neurosurgery consult in progress for recommednations surgery vs pain management.  We discussed risks of sugery, although he states if this will help with his pain and he is a candidate , he wants the surgery.  He does not have an MRI.  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  most recent labs with elevation in TSH  Has recently started taking on empty stomach.  Feels like he may not be as fatigued as in the past but due for repeat testing today           Complaint of cerebral infarction due to unspecified occlusion or stenosis of unspecified cerebral artery..  Scheduled to follow up with neuro/ " stroke on 5/27 - no new complaints.  Still not able to get lauren ezetimibe approved from insurance company     ROS:     CONSTITUTIONAL:  Negative for chills, fatigue and fever.      CARDIOVASCULAR:  Negative for chest pain and pedal edema.      RESPIRATORY:  Negative for recent cough and dyspnea.      GASTROINTESTINAL:  Negative for abdominal pain, constipation, diarrhea, heartburn, nausea and vomiting.      GENITOURINARY:  Negative for dysuria and change in urine stream.      MUSCULOSKELETAL:  Positive for arthralgias and joint stiffness (neck / diffuse).   Negative for myalgias.      NEUROLOGICAL:  Positive for paresthesia ( both arms and hands, but the right is worse than the left ).   Negative for dizziness, fainting or headaches.      ENDOCRINE:  Negative for hair loss, polydipsia and polyphagia.      ALLERGIC/IMMUNOLOGIC:  Negative for seasonal allergies.      PSYCHIATRIC:  Negative for anxiety, depression and suicidal thoughts.          Past Medical History / Family History / Social History:         Last Reviewed on 5/17/2021 01:42 PM by Shayy Galaviz    Past Medical History:             PAST MEDICAL HISTORY         Hypertension     Pneumonia     Hypothyroidism: after radiation;     tonsil CA - chemo and radiation - 11 years ago - now cleared     surgery for busted blood vessel         CURRENT MEDICAL PROVIDERS:    Oncologist: Dr. Dr. Pantoja in [facility]             COLORECTAL CANCER SCREENING: Up to date (colonoscopy q10y; sigmoidoscopy q5y; Cologuard q3y) was last done patient thinks it in 2014; colonoscopy with normal results; Dr. Benitez         Surgical History:         throat cancer resection in 2008;    Back surgery X 2 in 1987;         Family History:     Father: Cause of death was MI     Mother: Healthy     Paternal Grandfather: Cause of death was MI     Maternal Grandfather: Cause of death was MI         Social History:     Occupation: Retired (Prior occupation:  [employer]     Marital Status:  ( and remarried)     Children: 1 step-child         Tobacco/Alcohol/Supplements:     Last Reviewed on 5/17/2021 10:05 AM by Corine Small    Tobacco: He has a past history of cigarette smoking; quit date:  1999.          Alcohol: Frequency: Socially         Substance Abuse History:     Last Reviewed on 1/15/2021 05:46 PM by Shayy Galaviz    None         Mental Health History:     Last Reviewed on 1/15/2021 05:46 PM by Shayy Galaviz    NEGATIVE         Communicable Diseases (eg STDs):     Last Reviewed on 1/15/2021 05:46 PM by Shayy Galaviz    Reportable health conditions; NEGATIVE         Current Problems:     Last Reviewed on 5/17/2021 01:42 PM by Shayy Galaviz    Malignant neoplasm of pharynx, unspecified    Polycythemia vera    Hypothyroidism, unspecified    Other specified anxiety disorders    Syncope and collapse    Essential (primary) hypertension    Radiculopathy, lumbar region    Other specified disorders of bone, multiple sites    Primary insomnia    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    Constipation, unspecified    Shortness of breath    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, right hand    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, left hand    Encounter for screening for malignant neoplasm of prostate    Cough    Other long term (current) drug therapy    Encounter for screening for depression    Encounter for immunization    Encounter for general adult medical examination without abnormal findings    Encounter for follow-up examination after completed treatment for conditions other than malignant neoplasm    Cervicalgia    Hypokalemia    Cerebral infarction due to unspecified occlusion or stenosis of unspecified cerebral artery    Dysphagia, unspecified    Cervical root disorders, not elsewhere classified        Immunizations:     influenza, high-dose, quadrivalent 9/22/2020    Pneumococcal  conjugate PCV 13 (PREVNAR 13) 1/11/2021    Fluzone (3 + years dose) 11/1/2017    Fluad pf 9/4/2019    Influenza Virus Vaccine, unspecified formulation 11/1/2018    PNEUMOVAX 23 (Pneumococcal PPV23) 3/28/2019        Allergies:     Last Reviewed on 5/17/2021 10:05 AM by Corine Small    Atorvastatin Calcium: Liver function abnormalities  (Adverse Reaction)    LISINOPRIL 40MG TABLETS: Dry cough  (Adverse Reaction)    codeine:          Current Medications:     Last Reviewed on 5/17/2021 10:05 AM by Corine Small    omeprazole 40 mg oral capsule,delayed release (enteric coated) [take 1 capsule (40 mg) by oral route 1 time per day ]    levothyroxine 137 mcg oral tablet [TAKE 1 TABLET BY MOUTH DAILY ON AN EMPTY STOMACH]    Melatonin     traZODone 50 mg oral tablet [TAKE 1 TO 2 TABLETS BY MOUTH EVERY NIGHT AT BEDTIME AS NEEDED]    Vitamin B-12 500 mcg oral tablet [1 tab daily]    amLODIPine 10 mg oral tablet [TK 1 T PO QD]    losartan 100 mg oral tablet [take 1 tablet (100 mg) by oral route once daily]    hydroCHLOROthiazide 12.5 mg oral capsule [TAKE 1 CAPSULE BY MOUTH DAILY]    ASPIRIN 81MG CHEWABLE TABLETS  [CHEW AND SWALLOW 1 TABLET EVERY DAY]    METOPROLOL TARTRATE 25MG TABLETS  [TAKE 1 TABLET BY MOUTH TWICE DAILY]    FIRST-OMEPRA YADIRA 2MG/ML     rivaroxaban 20 mg oral tablet [take 1 tablet (20 mg) by oral route once daily with the evening meal]    ezetimibe 10 mg oral tablet [take 1 tablet (10 mg) by oral route once daily]        Objective:        Vitals:         Current: 5/17/2021 10:02:20 AM    Ht:  5 ft, 9 in;  Wt: 167.6 lbs;  BMI: 24.7T: 97.5 F (temporal);  BP: 120/64 mm Hg (left arm, sitting);  P: 70 bpm (left arm (BP Cuff), sitting);  sCr: 1.2 mg/dL;  GFR: 49.84        Exams:     PHYSICAL EXAM:     GENERAL: Vitals recorded well developed, well nourished;  no apparent distress;     NECK: range of motion is normal;     RESPIRATORY: normal appearance and symmetric expansion of chest wall; normal respiratory rate and  pattern with no distress; normal breath sounds with no rales, rhonchi, wheezes or rubs;     CARDIOVASCULAR: normal rate; rhythm is regular;  no edema;     LYMPHATIC: no enlargement of cervical or facial nodes; no supraclavicular nodes;     MUSCULOSKELETAL: normal gait; normal range of motion of all major muscle groups; pain with range of motion in: neck forward flexion, extension, lateral flexion, and rotation;     NEUROLOGIC: mental status: alert and oriented x 3;     PSYCHIATRIC: appropriate affect and demeanor; normal speech pattern; normal thought and perception;         Assessment:         I10   Essential (primary) hypertension       M54.2   Cervicalgia       D45   Polycythemia vera       E03.2   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 Follow up in 3 months         CervicalgiaWill get the referral to Neurosurgery refaxed, get an MRI for recommendations based on results of this and consider pain management        Polycythemia verafollow up labs today    LABORATORY:  Labs ordered to be performed today include CBC W/O DIFF.            Orders:       [id number]  BDCB2 - [facility] CBC w/o diff  (Send-Out)              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changes based on pending lab results     LABORATORY:  Labs ordered to be performed today include TSH.            Orders:       [id number]  TSH - [facility] TSH  (Send-Out)              Cervical root  disorders, not elsewhere classifiedWill get MRI and will have him consult with neurosurgery for recommendations for treatment  Pain management vs surgery.  I have discussed that he is a high risk patient and pain management may be the best option , but he would like to pursue surgery should he be a candidate and if this will improve is quality of life         FOLLOW-UP TESTING #1:    RADIOLOGY:  I have ordered MRI cervical spine w/o contrast to be done today.            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

## 2021-06-24 ENCOUNTER — TRANSCRIBE ORDERS (OUTPATIENT)
Dept: ADMINISTRATIVE | Facility: HOSPITAL | Age: 76
End: 2021-06-24

## 2021-06-24 DIAGNOSIS — Z85.819 HISTORY OF THROAT CANCER: ICD-10-CM

## 2021-06-24 DIAGNOSIS — M06.9 RHEUMATOID ARTHRITIS, INVOLVING UNSPECIFIED SITE, UNSPECIFIED WHETHER RHEUMATOID FACTOR PRESENT (HCC): ICD-10-CM

## 2021-06-24 DIAGNOSIS — R51.9 NONINTRACTABLE HEADACHE, UNSPECIFIED CHRONICITY PATTERN, UNSPECIFIED HEADACHE TYPE: Primary | ICD-10-CM

## 2021-06-25 ENCOUNTER — TELEPHONE (OUTPATIENT)
Dept: FAMILY MEDICINE CLINIC | Age: 76
End: 2021-06-25

## 2021-06-25 NOTE — TELEPHONE ENCOUNTER
Caller: Dixon Amador    Relationship: Self    Best call back number: [phone]    What medications are you currently taking:   No current outpatient medications on file prior to visit.     No current facility-administered medications on file prior to visit.        When did you start taking these medications: FOUR MONTHS    Which medication are you concerned about: XARELTO    Who prescribed you this medication: ANCA ESPINO    What are your concerns: THE PATIENT STATED HE NEEDS TO SEE AN ORAL SURGEON REGARDING BROKEN TEETH. THE SURGEON TOLD HIM THEY CANNOT PERFORM THE SURGERY WHILE HE IS ON XARELTO OR SUBSITUTED, BUT HE STATED HE NEEDS TO BE ON IT FOR HIS BLOOD CLOTS. THE PATIENT WOULD LIKE A CALL BACK TO DISCUSS HIS OPTIONS ASAP. HE WOULD ALSO LIKE A CALL BACK TO DISCUSS HIS NECK X-RAYS THAT SHOULD BE SENT TO HIS ARTHRITIS DOCTOR AND A REFERRAL FOR PAIN THERAPY.    How long have you been taking these medications: FOUR MONTHS    How long have you had these concerns: 06/25/2021

## 2021-07-01 VITALS
HEART RATE: 76 BPM | WEIGHT: 137.8 LBS | SYSTOLIC BLOOD PRESSURE: 126 MMHG | HEIGHT: 69 IN | BODY MASS INDEX: 20.41 KG/M2 | TEMPERATURE: 97.8 F | DIASTOLIC BLOOD PRESSURE: 67 MMHG

## 2021-07-01 VITALS
WEIGHT: 129.4 LBS | HEART RATE: 60 BPM | DIASTOLIC BLOOD PRESSURE: 63 MMHG | TEMPERATURE: 98.4 F | BODY MASS INDEX: 19.16 KG/M2 | HEIGHT: 69 IN | SYSTOLIC BLOOD PRESSURE: 125 MMHG

## 2021-07-01 VITALS
WEIGHT: 132.8 LBS | TEMPERATURE: 97.8 F | BODY MASS INDEX: 19.67 KG/M2 | DIASTOLIC BLOOD PRESSURE: 65 MMHG | HEART RATE: 59 BPM | HEIGHT: 69 IN | SYSTOLIC BLOOD PRESSURE: 127 MMHG

## 2021-07-01 VITALS
HEART RATE: 62 BPM | WEIGHT: 131 LBS | TEMPERATURE: 98.2 F | SYSTOLIC BLOOD PRESSURE: 144 MMHG | DIASTOLIC BLOOD PRESSURE: 68 MMHG | HEIGHT: 69 IN | BODY MASS INDEX: 19.4 KG/M2

## 2021-07-01 VITALS
DIASTOLIC BLOOD PRESSURE: 63 MMHG | SYSTOLIC BLOOD PRESSURE: 140 MMHG | TEMPERATURE: 98.3 F | WEIGHT: 126.8 LBS | BODY MASS INDEX: 18.78 KG/M2 | HEIGHT: 69 IN | HEART RATE: 56 BPM

## 2021-07-01 VITALS
WEIGHT: 135.6 LBS | SYSTOLIC BLOOD PRESSURE: 174 MMHG | TEMPERATURE: 99 F | HEIGHT: 69 IN | DIASTOLIC BLOOD PRESSURE: 81 MMHG | BODY MASS INDEX: 20.08 KG/M2 | HEART RATE: 75 BPM

## 2021-07-01 VITALS
SYSTOLIC BLOOD PRESSURE: 151 MMHG | BODY MASS INDEX: 19.34 KG/M2 | TEMPERATURE: 98.2 F | DIASTOLIC BLOOD PRESSURE: 70 MMHG | HEIGHT: 69 IN | HEART RATE: 55 BPM | WEIGHT: 130.6 LBS

## 2021-07-01 VITALS
HEART RATE: 66 BPM | DIASTOLIC BLOOD PRESSURE: 96 MMHG | SYSTOLIC BLOOD PRESSURE: 194 MMHG | WEIGHT: 137.4 LBS | HEIGHT: 69 IN | TEMPERATURE: 98.2 F | BODY MASS INDEX: 20.35 KG/M2

## 2021-07-01 VITALS
BODY MASS INDEX: 20.5 KG/M2 | HEIGHT: 69 IN | TEMPERATURE: 98.6 F | SYSTOLIC BLOOD PRESSURE: 162 MMHG | DIASTOLIC BLOOD PRESSURE: 76 MMHG | HEART RATE: 67 BPM | WEIGHT: 138.4 LBS

## 2021-07-02 VITALS
WEIGHT: 172.6 LBS | SYSTOLIC BLOOD PRESSURE: 147 MMHG | HEIGHT: 69 IN | TEMPERATURE: 97.3 F | BODY MASS INDEX: 25.56 KG/M2 | HEART RATE: 75 BPM | DIASTOLIC BLOOD PRESSURE: 60 MMHG

## 2021-07-02 VITALS
SYSTOLIC BLOOD PRESSURE: 120 MMHG | DIASTOLIC BLOOD PRESSURE: 64 MMHG | HEIGHT: 69 IN | TEMPERATURE: 97.5 F | HEART RATE: 70 BPM | WEIGHT: 167.6 LBS | BODY MASS INDEX: 24.82 KG/M2

## 2021-07-02 VITALS
WEIGHT: 163.8 LBS | SYSTOLIC BLOOD PRESSURE: 148 MMHG | BODY MASS INDEX: 24.26 KG/M2 | DIASTOLIC BLOOD PRESSURE: 70 MMHG | HEART RATE: 60 BPM | TEMPERATURE: 97.6 F | HEIGHT: 69 IN

## 2021-07-02 VITALS
BODY MASS INDEX: 24.91 KG/M2 | WEIGHT: 168.2 LBS | HEART RATE: 72 BPM | SYSTOLIC BLOOD PRESSURE: 107 MMHG | TEMPERATURE: 97.6 F | DIASTOLIC BLOOD PRESSURE: 55 MMHG | HEIGHT: 69 IN

## 2021-07-02 VITALS — BODY MASS INDEX: 20.23 KG/M2 | HEIGHT: 69 IN | TEMPERATURE: 97.2 F

## 2021-07-02 VITALS
TEMPERATURE: 98.4 F | HEIGHT: 69 IN | DIASTOLIC BLOOD PRESSURE: 68 MMHG | BODY MASS INDEX: 20.29 KG/M2 | SYSTOLIC BLOOD PRESSURE: 142 MMHG | WEIGHT: 137 LBS | HEART RATE: 61 BPM

## 2021-07-02 VITALS
WEIGHT: 158.8 LBS | DIASTOLIC BLOOD PRESSURE: 60 MMHG | TEMPERATURE: 97.5 F | HEART RATE: 61 BPM | HEIGHT: 69 IN | BODY MASS INDEX: 23.52 KG/M2 | SYSTOLIC BLOOD PRESSURE: 130 MMHG

## 2021-07-02 VITALS
BODY MASS INDEX: 24.35 KG/M2 | DIASTOLIC BLOOD PRESSURE: 65 MMHG | WEIGHT: 164.4 LBS | HEIGHT: 69 IN | HEART RATE: 61 BPM | TEMPERATURE: 98.4 F | SYSTOLIC BLOOD PRESSURE: 127 MMHG

## 2021-07-06 PROBLEM — IMO0002 ULCERATIVE LESION: Status: ACTIVE | Noted: 2021-07-06

## 2021-07-06 PROBLEM — K21.9 ESOPHAGEAL REFLUX: Status: ACTIVE | Noted: 2021-07-06

## 2021-07-06 PROBLEM — M19.90 ARTHRITIS: Status: ACTIVE | Noted: 2021-07-06

## 2021-07-07 ENCOUNTER — OFFICE VISIT (OUTPATIENT)
Dept: FAMILY MEDICINE CLINIC | Age: 76
End: 2021-07-07

## 2021-07-07 VITALS
WEIGHT: 161.4 LBS | DIASTOLIC BLOOD PRESSURE: 61 MMHG | SYSTOLIC BLOOD PRESSURE: 108 MMHG | HEART RATE: 68 BPM | BODY MASS INDEX: 23.91 KG/M2 | HEIGHT: 69 IN | TEMPERATURE: 97.6 F

## 2021-07-07 DIAGNOSIS — G54.2 CERVICAL ROOT DISORDERS, NOT ELSEWHERE CLASSIFIED: ICD-10-CM

## 2021-07-07 DIAGNOSIS — R13.10 DYSPHAGIA, UNSPECIFIED TYPE: ICD-10-CM

## 2021-07-07 DIAGNOSIS — R05.9 COUGH: ICD-10-CM

## 2021-07-07 DIAGNOSIS — C09.9 TONSIL CANCER (HCC): ICD-10-CM

## 2021-07-07 DIAGNOSIS — D45 POLYCYTHEMIA VERA (HCC): ICD-10-CM

## 2021-07-07 DIAGNOSIS — I63.50 CEREBRAL INFARCTION DUE TO UNSPECIFIED OCCLUSION OR STENOSIS OF UNSPECIFIED CEREBRAL ARTERY (HCC): Primary | ICD-10-CM

## 2021-07-07 PROCEDURE — 99214 OFFICE O/P EST MOD 30 MIN: CPT | Performed by: NURSE PRACTITIONER

## 2021-07-07 RX ORDER — LISINOPRIL 2.5 MG/1
1 TABLET ORAL DAILY
COMMUNITY
End: 2021-10-05 | Stop reason: ALTCHOICE

## 2021-07-07 RX ORDER — AMLODIPINE BESYLATE 10 MG/1
10 TABLET ORAL DAILY
COMMUNITY
Start: 2021-06-26 | End: 2021-08-12

## 2021-07-07 RX ORDER — PHENOL 1.4 %
AEROSOL, SPRAY (ML) MUCOUS MEMBRANE NIGHTLY
COMMUNITY
Start: 2021-03-22

## 2021-07-07 RX ORDER — LEVOTHYROXINE SODIUM 137 UG/1
137 TABLET ORAL DAILY
COMMUNITY
Start: 2021-06-26 | End: 2021-08-31

## 2021-07-07 RX ORDER — ASPIRIN 81 MG/1
1 TABLET, CHEWABLE ORAL DAILY
COMMUNITY
Start: 2021-03-23 | End: 2021-10-20 | Stop reason: ALTCHOICE

## 2021-07-07 RX ORDER — TRAZODONE HYDROCHLORIDE 50 MG/1
50-100 TABLET ORAL NIGHTLY PRN
COMMUNITY
Start: 2021-06-27 | End: 2021-08-12

## 2021-07-07 RX ORDER — OMEPRAZOLE 20 MG/1
20 CAPSULE, DELAYED RELEASE ORAL DAILY
Qty: 14 CAPSULE | Refills: 0 | Status: SHIPPED | OUTPATIENT
Start: 2021-07-07 | End: 2021-10-05

## 2021-07-07 RX ORDER — HYDROCHLOROTHIAZIDE 12.5 MG/1
1 CAPSULE, GELATIN COATED ORAL DAILY
COMMUNITY
Start: 2021-07-03 | End: 2021-08-05

## 2021-07-07 RX ORDER — LOSARTAN POTASSIUM 100 MG/1
1 TABLET ORAL DAILY
COMMUNITY
Start: 2021-06-26 | End: 2021-10-04

## 2021-07-07 NOTE — PROGRESS NOTES
"Chief Complaint  Back Pain; having teeth surgery, wants to know if he can still take med; and Cough    Subjective          Dixon Amador presents to [facility]  1 month ago, had to have teeth pulled - [place] oral surgery  Dr. Porter pulled 3 teeth, had to be sent to [facility] ER secondary to bleeding.  Currently taking Xarelto for history of stroke / hypercoag state.  Needs more teeth removed but needs approval to be off blood thinners    Currently under the care of neurosurgery - pending bone scan from neurosurgery scheduled next week.  Requesting what next step may be.  He is unsure who his neurosurgeon is at this time.  He does have a history of RA - under the care of    Review of Systems   Constitutional: Negative for fatigue and fever.   HENT: Positive for trouble swallowing.    Respiratory: Positive for cough. Negative for shortness of breath.    Cardiovascular: Negative for chest pain.   Neurological: Positive for numbness (bilateral upper extremities ).   Psychiatric/Behavioral: Negative for dysphoric mood. The patient is not nervous/anxious.          Health Maintenance Due   Topic Date Due   • COLORECTAL CANCER SCREENING  Never done   • ZOSTER VACCINE (1 of 2) Never done   • TDAP/TD VACCINES (2 - Tdap) 05/21/2013   • HEPATITIS C SCREENING  Never done   • ANNUAL WELLNESS VISIT  Never done        Objective     Vital Signs:   /61 (BP Location: Left arm, Patient Position: Sitting)   Pulse 68   Temp 97.6 °F (36.4 °C) (Oral)   Ht 175.3 cm (69\")   Wt 73.2 kg (161 lb 6.4 oz)   BMI 23.83 kg/m²       Physical Exam  Vitals reviewed.   Constitutional:       Appearance: Normal appearance.   HENT:      Head: Normocephalic.   Eyes:      Pupils: Pupils are equal, round, and reactive to light.   Cardiovascular:      Rate and Rhythm: Normal rate and regular rhythm.      Heart sounds: No murmur heard.     Pulmonary:      Effort: Pulmonary effort is normal.      Breath sounds: " Examination of the right-lower field reveals rhonchi. Examination of the left-lower field reveals rhonchi. Rhonchi present.   Musculoskeletal:         General: Normal range of motion.   Neurological:      Mental Status: He is alert.   Psychiatric:         Mood and Affect: Mood normal.         Behavior: Behavior normal.          Result Review :{Labs  Result Review  Imaging  Med Tab  Media :23}                      Assessment and Plan    Diagnoses and all orders for this visit:    1. Cerebral infarction due to unspecified occlusion or stenosis of unspecified cerebral artery (CMS/HCC) (Primary)  Comments:  We will need to reach out to neuro to find out if he will be able to stop his xarelto/ have alternative treatment to allow for his dental extraction     2. Cervical root disorders, not elsewhere classified  Comments:  Follow up with neurosurgery as recommended     3. Polycythemia vera (CMS/HCC)  Comments:  may need to discuss with hematology     4. Tonsil cancer (CMS/HCC)  Comments:  consider the dysphagia as cause of the cough    5. Dysphagia, unspecified type  Comments:  consider as cause of cough.      6. Cough  Comments:  I have recommended that he start back on PPI for 2 weeks then consider allergy medication as well.  CXR if not improved or if fever/ SOA develops  Orders:  -     omeprazole (priLOSEC) 20 MG capsule; Take 1 capsule by mouth Daily for 14 days.  Dispense: 14 capsule; Refill: 0            Follow Up    No follow-ups on file.      Patient was given instructions and counseling regarding his condition or for health maintenance advice. Please see specific information pulled into the AVS if appropriate.

## 2021-07-12 ENCOUNTER — HOSPITAL ENCOUNTER (OUTPATIENT)
Dept: NUCLEAR MEDICINE | Facility: HOSPITAL | Age: 76
Discharge: HOME | End: 2021-07-12

## 2021-07-12 ENCOUNTER — HOSPITAL ENCOUNTER (OUTPATIENT)
Dept: GENERAL RADIOLOGY | Facility: HOSPITAL | Age: 76
Discharge: HOME | End: 2021-07-12

## 2021-07-12 ENCOUNTER — TRANSCRIBE ORDERS (OUTPATIENT)
Dept: ADMINISTRATIVE | Facility: HOSPITAL | Age: 76
End: 2021-07-12

## 2021-07-12 DIAGNOSIS — M54.9 BACK PAIN, UNSPECIFIED BACK LOCATION, UNSPECIFIED BACK PAIN LATERALITY, UNSPECIFIED CHRONICITY: ICD-10-CM

## 2021-07-12 DIAGNOSIS — M06.9 RHEUMATOID ARTHRITIS, INVOLVING UNSPECIFIED SITE, UNSPECIFIED WHETHER RHEUMATOID FACTOR PRESENT (HCC): ICD-10-CM

## 2021-07-12 DIAGNOSIS — R51.9 NONINTRACTABLE HEADACHE, UNSPECIFIED CHRONICITY PATTERN, UNSPECIFIED HEADACHE TYPE: ICD-10-CM

## 2021-07-12 DIAGNOSIS — Z85.819 HISTORY OF THROAT CANCER: ICD-10-CM

## 2021-07-12 DIAGNOSIS — M54.9 BACK PAIN, UNSPECIFIED BACK LOCATION, UNSPECIFIED BACK PAIN LATERALITY, UNSPECIFIED CHRONICITY: Primary | ICD-10-CM

## 2021-07-12 PROCEDURE — 72070 X-RAY EXAM THORAC SPINE 2VWS: CPT

## 2021-07-12 PROCEDURE — 72040 X-RAY EXAM NECK SPINE 2-3 VW: CPT

## 2021-07-12 PROCEDURE — 78306 BONE IMAGING WHOLE BODY: CPT

## 2021-07-12 PROCEDURE — A9503 TC99M MEDRONATE: HCPCS | Performed by: INTERNAL MEDICINE

## 2021-07-12 PROCEDURE — 0 TECHNETIUM MEDRONATE KIT: Performed by: INTERNAL MEDICINE

## 2021-07-12 RX ORDER — TC 99M MEDRONATE 20 MG/10ML
23.5 INJECTION, POWDER, LYOPHILIZED, FOR SOLUTION INTRAVENOUS
Status: COMPLETED | OUTPATIENT
Start: 2021-07-12 | End: 2021-07-12

## 2021-07-12 RX ADMIN — TC 99M MEDRONATE 23.5 MILLICURIE: 20 INJECTION, POWDER, LYOPHILIZED, FOR SOLUTION INTRAVENOUS at 09:20

## 2021-08-05 RX ORDER — HYDROCHLOROTHIAZIDE 12.5 MG/1
CAPSULE, GELATIN COATED ORAL
Qty: 90 CAPSULE | Refills: 0 | Status: SHIPPED | OUTPATIENT
Start: 2021-08-05 | End: 2021-10-28

## 2021-08-11 ENCOUNTER — OFFICE VISIT (OUTPATIENT)
Dept: FAMILY MEDICINE CLINIC | Age: 76
End: 2021-08-11

## 2021-08-11 VITALS
BODY MASS INDEX: 24.2 KG/M2 | WEIGHT: 163.4 LBS | HEIGHT: 69 IN | SYSTOLIC BLOOD PRESSURE: 142 MMHG | TEMPERATURE: 97.3 F | DIASTOLIC BLOOD PRESSURE: 88 MMHG | HEART RATE: 79 BPM

## 2021-08-11 DIAGNOSIS — G54.2 CERVICAL ROOT DISORDERS, NOT ELSEWHERE CLASSIFIED: ICD-10-CM

## 2021-08-11 DIAGNOSIS — A69.1 TRENCH MOUTH: ICD-10-CM

## 2021-08-11 DIAGNOSIS — Z79.899 OTHER LONG TERM (CURRENT) DRUG THERAPY: ICD-10-CM

## 2021-08-11 DIAGNOSIS — M06.041: Primary | ICD-10-CM

## 2021-08-11 DIAGNOSIS — I63.50 CEREBRAL INFARCTION DUE TO UNSPECIFIED OCCLUSION OR STENOSIS OF UNSPECIFIED CEREBRAL ARTERY (HCC): ICD-10-CM

## 2021-08-11 PROCEDURE — 99214 OFFICE O/P EST MOD 30 MIN: CPT | Performed by: NURSE PRACTITIONER

## 2021-08-11 NOTE — PROGRESS NOTES
Chief Complaint  Dixon Amador presents to [facility] for Neck Pain    Subjective          Regarding  Neck pain - still having problems  Pain is persistent.  Prior mri with extensive chronic findings.  He does have RA as well compounding the arthritic component .  He has an extensive list of comorbid conditions limiting treatment options.  He would like a referral to pain management.      He is also needing clarification on how to move forward with the teeth extraction and being on Xarelto.  He was not able to reach out to Neurology for their recommendation.          Review of Systems   Constitutional: Negative for fatigue and fever.   HENT: Negative for ear pain and sore throat.    Eyes: Negative for blurred vision.   Respiratory: Negative for cough and shortness of breath.    Cardiovascular: Negative for chest pain, palpitations and leg swelling.   Gastrointestinal: Negative for abdominal pain, constipation, diarrhea, nausea and vomiting.   Musculoskeletal: Positive for neck pain and neck stiffness. Negative for arthralgias and myalgias.   Skin: Positive for bruise (diffuse secondary to medication ). Negative for rash.   Neurological: Negative for dizziness, weakness and headache.   Psychiatric/Behavioral: Negative for sleep disturbance and depressed mood.         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    • Hypothyroidism, unspecified     AFTER RADIATION   • Malignant  neoplasm of pharynx, unspecified (CMS/HCC)    • Other long term (current) drug therapy    • Other specified anxiety disorders    • Other specified disorders of bone, multiple sites    • Pneumonia    • Polycythemia vera (CMS/HCC)    • Primary insomnia    • Radiculopathy, lumbar region    • 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, left hand (CMS/HCC)    • 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, right hand (CMS/HCC)    • Shortness of breath    • Syncope and collapse    • Tonsil cancer (CMS/HCC)     CHEMO AND RADIATION; OVER 11 YRS AGO; NOW CLEARD     Current Outpatient Medications   Medication Sig Dispense Refill   • aspirin 81 MG chewable tablet Chew 1 tablet Daily.     • hydroCHLOROthiazide (MICROZIDE) 12.5 MG capsule TAKE 1 CAPSULE BY MOUTH EVERY DAY 90 capsule 0   • levothyroxine (SYNTHROID, LEVOTHROID) 137 MCG tablet Take 137 mcg by mouth Daily. on an empty stomach     • lisinopril (PRINIVIL,ZESTRIL) 2.5 MG tablet Take 1 tablet by mouth Daily.     • losartan (COZAAR) 100 MG tablet Take 1 tablet by mouth Daily.     • Melatonin 10 MG tablet Take  by mouth Every Night.     • metoprolol tartrate (LOPRESSOR) 25 MG tablet Take 25 mg by mouth 2 (Two) Times a Day.     • rivaroxaban (Xarelto) 20 MG tablet Take 20 mg by mouth Every Night.     • amLODIPine (NORVASC) 10 MG tablet TAKE 1 TABLET BY MOUTH EVERY DAY 90 tablet 1   • traZODone (DESYREL) 50 MG tablet TAKE 1 TO 2 TABLETS BY MOUTH EVERY NIGHT AT BEDTIME AS NEEDED 180 tablet 1     No current facility-administered medications for this visit.     Past Surgical History:   Procedure Laterality Date   • BACK SURGERY  1987    x2   • OTHER SURGICAL HISTORY  2008    Throat cancer resection      Social History     Tobacco Use   • Smoking status: Former Smoker     Packs/day: 1.00     Years: 25.00     Pack years: 25.00     Types: Cigarettes     Quit date:      Years since quittin.6   • Smokeless tobacco: Former User     Quit date:    Substance Use  "Topics   • Alcohol use: Yes     Comment: socially   • Drug use: Never     Family History   Problem Relation Age of Onset   • No Known Problems Mother    • Heart attack Father    • Heart attack Maternal Grandfather    • Heart attack Paternal Grandfather      Health Maintenance Due   Topic Date Due   • COLORECTAL CANCER SCREENING  Never done   • ZOSTER VACCINE (1 of 2) Never done   • TDAP/TD VACCINES (2 - Tdap) 05/21/2013   • HEPATITIS C SCREENING  Never done   • ANNUAL WELLNESS VISIT  Never done      Immunization History   Administered Date(s) Administered   • COVID-19 (MODERNA) 02/24/2021, 04/02/2021   • Fluzone High Dose =>65 Years ([employer] ONLY) 09/04/2020   • Influenza TIV (IM) 11/01/2017   • Influenza, Unspecified 09/22/2020   • Pneumococcal Conjugate 13-Valent (PCV13) 01/11/2021   • Pneumococcal Polysaccharide (PPSV23) 11/13/2009, 03/28/2019   • Td 05/21/2003        Objective     Vitals:    08/11/21 0905   BP: 142/88   BP Location: Left arm   Patient Position: Sitting   Pulse: 79   Temp: 97.3 °F (36.3 °C)   TempSrc: Oral   Weight: 74.1 kg (163 lb 6.4 oz)   Height: 175.3 cm (69.02\")     Body mass index is 24.12 kg/m².     Physical Exam  Vitals reviewed.   Constitutional:       Appearance: Normal appearance.   HENT:      Head: Normocephalic.   Eyes:      Pupils: Pupils are equal, round, and reactive to light.   Cardiovascular:      Rate and Rhythm: Normal rate and regular rhythm.      Heart sounds: No murmur heard.     Pulmonary:      Effort: Pulmonary effort is normal.      Breath sounds: Normal breath sounds.   Musculoskeletal:      Cervical back: Rigidity present. Pain with movement present. Decreased range of motion.   Neurological:      Mental Status: He is alert.   Psychiatric:         Mood and Affect: Mood normal.         Behavior: Behavior normal.           Result Review :                           Assessment and Plan      Diagnoses and all orders for this visit:    1. Rheumatoid arthritis without " rheumatoid factor, right hand (CMS/HCC) (Primary)  Comments:  referral for recommendations  Orders:  -     Ambulatory Referral to Pain Management    2. Cervical root disorders, not elsewhere classified  Comments:  referral for further treatment recommendations    Orders:  -     Ambulatory Referral to Pain Management    3. Trench mouth  Comments:  Will get with neurology to determine alternative options for treatment while tooth extraction in process    4. Cerebral infarction due to unspecified occlusion or stenosis of unspecified cerebral artery (CMS/[facility])  Comments:  Must remain on Xarelto until discussion with neuro as to alternative treatment during tooth extraction    5. Other long term (current) drug therapy  Comments:  high risk medication for surgery - guidance pending               Follow Up     No follow-ups on file.    Patient was given instructions and counseling regarding his condition or for health maintenance advice. Please see specific information pulled into the AVS if appropriate.

## 2021-08-12 RX ORDER — TRAZODONE HYDROCHLORIDE 50 MG/1
TABLET ORAL
Qty: 180 TABLET | Refills: 1 | Status: SHIPPED | OUTPATIENT
Start: 2021-08-12 | End: 2022-03-04 | Stop reason: SDUPTHER

## 2021-08-12 RX ORDER — AMLODIPINE BESYLATE 10 MG/1
TABLET ORAL
Qty: 90 TABLET | Refills: 1 | Status: SHIPPED | OUTPATIENT
Start: 2021-08-12 | End: 2022-03-04 | Stop reason: SDUPTHER

## 2021-08-27 ENCOUNTER — PRIOR AUTHORIZATION (OUTPATIENT)
Dept: FAMILY MEDICINE CLINIC | Age: 76
End: 2021-08-27

## 2021-08-27 DIAGNOSIS — E78.5 HYPERLIPIDEMIA, UNSPECIFIED HYPERLIPIDEMIA TYPE: Primary | ICD-10-CM

## 2021-08-27 RX ORDER — EZETIMIBE 10 MG/1
10 TABLET ORAL DAILY
COMMUNITY
End: 2021-08-27 | Stop reason: SDUPTHER

## 2021-08-27 NOTE — TELEPHONE ENCOUNTER
PA approved for Ezetimibe 10mg tablet for 30 days instead of 90 days.    Ref # - [id number]    8-25-21/8-    Will you please send in. Aydee to pend./al

## 2021-08-29 RX ORDER — EZETIMIBE 10 MG/1
10 TABLET ORAL DAILY
Qty: 30 TABLET | Refills: 11 | Status: SHIPPED | OUTPATIENT
Start: 2021-08-29 | End: 2022-09-20

## 2021-08-31 RX ORDER — LEVOTHYROXINE SODIUM 137 UG/1
TABLET ORAL
Qty: 90 TABLET | Refills: 0 | Status: SHIPPED | OUTPATIENT
Start: 2021-08-31 | End: 2021-11-29

## 2021-09-16 ENCOUNTER — TELEPHONE (OUTPATIENT)
Dept: FAMILY MEDICINE CLINIC | Age: 76
End: 2021-09-16

## 2021-09-16 NOTE — TELEPHONE ENCOUNTER
Caller: Dixon Amador    Relationship: Self    Best call back number: [phone]    What medications are you currently taking:   Current Outpatient Medications on File Prior to Visit   Medication Sig Dispense Refill   • amLODIPine (NORVASC) 10 MG tablet TAKE 1 TABLET BY MOUTH EVERY DAY 90 tablet 1   • aspirin 81 MG chewable tablet Chew 1 tablet Daily.     • ezetimibe (ZETIA) 10 MG tablet Take 1 tablet by mouth Daily for 30 days. 30 tablet 11   • hydroCHLOROthiazide (MICROZIDE) 12.5 MG capsule TAKE 1 CAPSULE BY MOUTH EVERY DAY 90 capsule 0   • levothyroxine (SYNTHROID, LEVOTHROID) 137 MCG tablet TAKE 1 TABLET BY MOUTH DAILY ON AN EMPTY STOMACH 90 tablet 0   • lisinopril (PRINIVIL,ZESTRIL) 2.5 MG tablet Take 1 tablet by mouth Daily.     • losartan (COZAAR) 100 MG tablet Take 1 tablet by mouth Daily.     • Melatonin 10 MG tablet Take  by mouth Every Night.     • metoprolol tartrate (LOPRESSOR) 25 MG tablet Take 25 mg by mouth 2 (Two) Times a Day.     • rivaroxaban (Xarelto) 20 MG tablet Take 20 mg by mouth Every Night.     • traZODone (DESYREL) 50 MG tablet TAKE 1 TO 2 TABLETS BY MOUTH EVERY NIGHT AT BEDTIME AS NEEDED 180 tablet 1     No current facility-administered medications on file prior to visit.      Which medication are you concerned about: BLOOD THINNER    What are your concerns: DENTAL OFFICE NEEDS TO HAVE PATIENT OFF OF THIS MEDICATION, NEEDING TO KNOW HOW LONG HE CAN BE OFF OF MEDICATION FOR EMERGENCY DENTAL SURGERY     DR ROWE  [phone]

## 2021-09-20 ENCOUNTER — TELEPHONE (OUTPATIENT)
Dept: FAMILY MEDICINE CLINIC | Age: 76
End: 2021-09-20

## 2021-09-21 NOTE — TELEPHONE ENCOUNTER
Form complete  He will need to stop the blood thinner the day before and the day of - resume taking morning of the day after

## 2021-09-23 ENCOUNTER — TELEPHONE (OUTPATIENT)
Dept: FAMILY MEDICINE CLINIC | Age: 76
End: 2021-09-23

## 2021-09-23 NOTE — TELEPHONE ENCOUNTER
Provider: ANCA ESPINO  Caller: GIANLUCA ESTEVEZ  Relationship to Patient: SELF  Phone Number: [phone]   Reason for Call: FORM FOR AFFORDABLE DENTIST WAS SENT TO WRONG FAX NUMBER. PLEASE RESEND TO CORRECT FAX # [phone]  IF ANY QUESTIONS, THEIR PHONE # [phone]

## 2021-10-04 DIAGNOSIS — R05.9 COUGH: ICD-10-CM

## 2021-10-04 NOTE — TELEPHONE ENCOUNTER
Rx Refill Note  Requested Prescriptions     Pending Prescriptions Disp Refills   • losartan (COZAAR) 100 MG tablet [Pharmacy Med Name: LOSARTAN 100MG TABLETS] 90 tablet      Sig: TAKE 1 TABLET(100 MG) BY MOUTH EVERY DAY   • omeprazole (priLOSEC) 20 MG capsule [Pharmacy Med Name: OMEPRAZOLE 20MG CAPSULES] 14 capsule 0     Sig: TAKE 1 CAPSULE BY MOUTH DAILY FOR 14 DAYS      Last office visit with prescribing clinician: 8/11/2021      Next office visit with prescribing clinician: Visit date not found       {TIP  Please add Last Relevant Lab Date if appropriate:   Lab Results   Component Value Date    BUN 21 04/07/2021    CREATININE 1.20 04/07/2021    BCR 18 04/07/2021    K 4.5 04/07/2021    CO2 25 04/07/2021    CALCIUM 9.8 04/07/2021    ALBUMIN 4.1 04/07/2021    LABIL2 1.3 (L) 04/07/2021    AST 47 04/07/2021    ALT 73 (H) 04/07/2021     WBC   Date Value Ref Range Status   05/17/2021 6.30 4.80 - 10.80 10*3/uL Final     RBC   Date Value Ref Range Status   05/17/2021 4.77 4.70 - 6.10 10*6/uL Final     Hemoglobin   Date Value Ref Range Status   09/26/2019 13.7 (L) 14.0 - 18.0 g/dL Final     Hgb   Date Value Ref Range Status   05/17/2021 14.60 14.00 - 18.00 g/dL Final     Hematocrit   Date Value Ref Range Status   05/17/2021 43.8 42.0 - 52.0 % Final     MCV   Date Value Ref Range Status   05/17/2021 91.8 80.0 - 96.0 fL Final     MCH   Date Value Ref Range Status   05/17/2021 30.6 27.0 - 31.0 pg Final     MCHC   Date Value Ref Range Status   05/17/2021 33.3 33.0 - 37.0 Final     RDW   Date Value Ref Range Status   05/17/2021 13.0 11.5 - 14.5 % Final     RDW-SD   Date Value Ref Range Status   11/12/2020 45.8 NA Final     MPV   Date Value Ref Range Status   05/17/2021 10.3 7.4 - 10.4 fL Final     Comment:     Testing performed by:  [facility]  CLIA#[id number]  [address]  [place] [address]       Platelets   Date Value Ref Range Status   05/17/2021 227.00 130.00 - 400.00 10*3/uL  Final     Neutrophil Rel %   Date Value Ref Range Status   09/26/2019 58.9 30.0 - 85.0 % Final     Lymphocyte Rel %   Date Value Ref Range Status   09/26/2019 21.7 20.0 - 45.0 % Final     Monocyte Rel %   Date Value Ref Range Status   09/26/2019 8.2 3.0 - 10.0 % Final     Eosinophil Rel %   Date Value Ref Range Status   09/26/2019 10.3 (H) 0.0 - 7.0 % Final     Basophil Rel %   Date Value Ref Range Status   09/26/2019 0.7 0.0 - 3.0 % Final     Neutrophils Absolute   Date Value Ref Range Status   09/26/2019 3.61 2.00 - 8.00 10*3/uL Final     Lymphocytes Absolute   Date Value Ref Range Status   09/26/2019 1.33 1.00 - 5.00 10*3/uL Final     Monocytes Absolute   Date Value Ref Range Status   11/12/2020 0.50 0.20 - 1.20 10*3/uL Final     Eosinophils Absolute   Date Value Ref Range Status   11/12/2020 0.49 0.00 - 0.70 10*3/uL Final     Basophils Absolute   Date Value Ref Range Status   11/12/2020 0.03 0.00 - 0.20 10*3/uL Final     NRBC   Date Value Ref Range Status   09/26/2019 0.00 0.0 - 0.7 % Final        {TIP  Is Refill Pharmacy correct?YES  Connie Barber  10/04/21, 14:38 EDT

## 2021-10-05 RX ORDER — OMEPRAZOLE 20 MG/1
20 CAPSULE, DELAYED RELEASE ORAL DAILY
Qty: 30 CAPSULE | Refills: 0 | Status: SHIPPED | OUTPATIENT
Start: 2021-10-05 | End: 2021-11-04

## 2021-10-05 RX ORDER — LOSARTAN POTASSIUM 100 MG/1
TABLET ORAL
Qty: 90 TABLET | Refills: 1 | Status: SHIPPED | OUTPATIENT
Start: 2021-10-05 | End: 2022-03-04 | Stop reason: SDUPTHER

## 2021-10-20 RX ORDER — RIVAROXABAN 20 MG/1
TABLET, FILM COATED ORAL
Qty: 90 TABLET | Refills: 1 | Status: SHIPPED | OUTPATIENT
Start: 2021-10-20 | End: 2022-03-04 | Stop reason: SDUPTHER

## 2021-10-28 RX ORDER — HYDROCHLOROTHIAZIDE 12.5 MG/1
CAPSULE, GELATIN COATED ORAL
Qty: 90 CAPSULE | Refills: 0 | Status: SHIPPED | OUTPATIENT
Start: 2021-10-28 | End: 2022-03-04 | Stop reason: SDUPTHER

## 2021-11-29 RX ORDER — LEVOTHYROXINE SODIUM 137 UG/1
TABLET ORAL
Qty: 90 TABLET | Refills: 0 | Status: SHIPPED | OUTPATIENT
Start: 2021-11-29 | End: 2022-03-02 | Stop reason: SDUPTHER

## 2022-03-01 RX ORDER — LEVOTHYROXINE SODIUM 137 UG/1
TABLET ORAL
Qty: 90 TABLET | Refills: 0 | OUTPATIENT
Start: 2022-03-01

## 2022-03-01 NOTE — TELEPHONE ENCOUNTER
Attempted to call pt regarding refill, number in chart not valid, med denied with note to call the office to schedule an appt, will refill med when appt is made

## 2022-03-02 ENCOUNTER — TELEPHONE (OUTPATIENT)
Dept: FAMILY MEDICINE CLINIC | Age: 77
End: 2022-03-02

## 2022-03-02 DIAGNOSIS — E03.8 OTHER SPECIFIED HYPOTHYROIDISM: Primary | ICD-10-CM

## 2022-03-02 RX ORDER — LEVOTHYROXINE SODIUM 137 UG/1
137 TABLET ORAL DAILY
Qty: 30 TABLET | Refills: 0 | Status: SHIPPED | OUTPATIENT
Start: 2022-03-02 | End: 2022-03-04

## 2022-03-02 NOTE — TELEPHONE ENCOUNTER
Caller: Dixon Amador    Relationship: Self    Best call back number: [phone]    Requested Prescriptions:   Requested Prescriptions     Pending Prescriptions Disp Refills   • levothyroxine (SYNTHROID, LEVOTHROID) 137 MCG tablet 90 tablet 0     Sig: Take 1 tablet by mouth Daily. on an empty stomach        Pharmacy where request should be sent: [id number] [place] [address] [place] [facility] [place] [address] [facility] - [phone] [facility] [phone] FX     Additional details provided by patient: PATIENT IS TOTALLY OUT OF MEDICATION, HE DOES HAVE AN APPOINTMENT FOR Friday, BUT NEEDS MEDICATION BEFORE THEN.     Does the patient have less than a 3 day supply:  [x] Yes  [] No    Vlad FUNEZ Rep   03/02/22 08:30 EST

## 2022-03-04 ENCOUNTER — OFFICE VISIT (OUTPATIENT)
Dept: FAMILY MEDICINE CLINIC | Age: 77
End: 2022-03-04

## 2022-03-04 VITALS
DIASTOLIC BLOOD PRESSURE: 73 MMHG | TEMPERATURE: 98.3 F | BODY MASS INDEX: 23.61 KG/M2 | HEART RATE: 64 BPM | WEIGHT: 159.4 LBS | HEIGHT: 69 IN | SYSTOLIC BLOOD PRESSURE: 146 MMHG | OXYGEN SATURATION: 96 %

## 2022-03-04 DIAGNOSIS — E78.5 HYPERLIPIDEMIA, UNSPECIFIED HYPERLIPIDEMIA TYPE: ICD-10-CM

## 2022-03-04 DIAGNOSIS — F51.01 PRIMARY INSOMNIA: ICD-10-CM

## 2022-03-04 DIAGNOSIS — I63.50 CEREBRAL INFARCTION DUE TO UNSPECIFIED OCCLUSION OR STENOSIS OF UNSPECIFIED CEREBRAL ARTERY: ICD-10-CM

## 2022-03-04 DIAGNOSIS — G54.2 CERVICAL ROOT DISORDERS, NOT ELSEWHERE CLASSIFIED: Primary | ICD-10-CM

## 2022-03-04 DIAGNOSIS — I10 ESSENTIAL (PRIMARY) HYPERTENSION: ICD-10-CM

## 2022-03-04 PROCEDURE — 99214 OFFICE O/P EST MOD 30 MIN: CPT | Performed by: NURSE PRACTITIONER

## 2022-03-04 RX ORDER — RIVAROXABAN 20 MG/1
20 TABLET, FILM COATED ORAL
Qty: 90 TABLET | Refills: 1 | Status: SHIPPED | OUTPATIENT
Start: 2022-03-04 | End: 2022-12-27

## 2022-03-04 RX ORDER — LOSARTAN POTASSIUM 100 MG/1
100 TABLET ORAL DAILY
Qty: 90 TABLET | Refills: 1 | Status: SHIPPED | OUTPATIENT
Start: 2022-03-04 | End: 2022-10-31

## 2022-03-04 RX ORDER — AMLODIPINE BESYLATE 10 MG/1
10 TABLET ORAL DAILY
Qty: 90 TABLET | Refills: 1 | Status: SHIPPED | OUTPATIENT
Start: 2022-03-04 | End: 2022-10-31

## 2022-03-04 RX ORDER — POLYETHYLENE GLYCOL 1000
POWDER (GRAM) MISCELLANEOUS AS NEEDED
COMMUNITY
End: 2022-10-04 | Stop reason: SDUPTHER

## 2022-03-04 RX ORDER — HYDROCHLOROTHIAZIDE 12.5 MG/1
12.5 CAPSULE, GELATIN COATED ORAL DAILY
Qty: 90 CAPSULE | Refills: 1 | Status: SHIPPED | OUTPATIENT
Start: 2022-03-04 | End: 2022-03-30

## 2022-03-04 RX ORDER — LEVOTHYROXINE SODIUM 137 UG/1
137 TABLET ORAL DAILY
Qty: 90 TABLET | Refills: 1 | Status: SHIPPED | OUTPATIENT
Start: 2022-03-04 | End: 2022-12-06

## 2022-03-04 RX ORDER — HYDROCODONE BITARTRATE AND ACETAMINOPHEN 5; 325 MG/1; MG/1
1 TABLET ORAL AS NEEDED
COMMUNITY

## 2022-03-04 RX ORDER — TRAZODONE HYDROCHLORIDE 50 MG/1
50-100 TABLET ORAL NIGHTLY PRN
Qty: 180 TABLET | Refills: 1 | Status: SHIPPED | OUTPATIENT
Start: 2022-03-04

## 2022-03-04 NOTE — PROGRESS NOTES
Chief Complaint  Dixon Amador presents to [facility] for Hypothyroidism (med management ) and Hypertension    Subjective          Dixon presents today for follow up on Hypertension.  Reported as not well controlled.  Cardiac symptoms fatigue.  Current medication / treatment includes Amlodipine, losartan, Metoprolol, HCTZ   Cardiovascular risk factors: advanced age (older than 55 for men, 65 for women), dyslipidemia, hypertension and male gender    Feels like may have had another TIA last week but symptoms have resolved with just slight tingling in his right foot but getting better.  He is following up with vascular PRN and was told would probably experience occasional TIAs.  No lingering symptoms.  Taking metoprolol and xarelto as recommended daily      Dixon presents for follow up on hypothyroidism. Denies symptoms of thyroid dysfunction at this time.  Reports compliance with medication including :Levothyroxoine 137  He did miss a few days due to running out.        Last Lab Results       Component                Value               Date                       TSH                      0.349               05/17/2021                    Review of Systems      Allergies   Allergen Reactions   • Atorvastatin Unknown - Low Severity   • Codeine Unknown - Low Severity     causes delium  causes delium        Past Medical History:   Diagnosis Date   • Cerebral infarction due to unspecified occlusion or stenosis of unspecified cerebral artery (HCC)    • Cervical root disorders, not elsewhere classified    • Cervicalgia    • Constipation, unspecified    • Cough    • Dysphagia, unspecified    • Essential (primary) hypertension    • Hypokalemia    • 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    • Hypothyroidism, unspecified     AFTER RADIATION   • Malignant neoplasm of pharynx, unspecified (HCC)    • Other long term (current) drug therapy    • Other specified anxiety  disorders    • Other specified disorders of bone, multiple sites    • Pneumonia    • Polycythemia vera (HCC)    • Primary insomnia    • Radiculopathy, lumbar region    • 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, left hand (HCC)    • 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, right hand (HCC)    • Shortness of breath    • Syncope and collapse    • Tonsil cancer (HCC)     CHEMO AND RADIATION; OVER 11 YRS AGO; NOW CLEARD     Current Outpatient Medications   Medication Sig Dispense Refill   • amLODIPine (NORVASC) 10 MG tablet Take 1 tablet by mouth Daily. 90 tablet 1   • hydroCHLOROthiazide (MICROZIDE) 12.5 MG capsule Take 1 capsule by mouth Daily. 90 capsule 1   • HYDROcodone-acetaminophen (NORCO) 5-325 MG per tablet As Needed.     • losartan (COZAAR) 100 MG tablet Take 1 tablet by mouth Daily. 90 tablet 1   • Melatonin 10 MG tablet Take  by mouth Every Night.     • metoprolol tartrate (LOPRESSOR) 25 MG tablet Take 1 tablet by mouth 2 (Two) Times a Day. 180 tablet 1   • Polyethylene Glycol 1000 powder As Needed.     • traZODone (DESYREL) 50 MG tablet Take 1-2 tablets by mouth At Night As Needed for Sleep. 180 tablet 1   • Xarelto 20 MG tablet Take 1 tablet by mouth Daily With Dinner. 90 tablet 1   • ezetimibe (ZETIA) 10 MG tablet Take 1 tablet by mouth Daily for 30 days. 30 tablet 11   • levothyroxine (SYNTHROID, LEVOTHROID) 137 MCG tablet TAKE 1 TABLET BY MOUTH DAILY ON AN EMPTY STOMACH 90 tablet 1     No current facility-administered medications for this visit.     Past Surgical History:   Procedure Laterality Date   • BACK SURGERY  1987    x2   • OTHER SURGICAL HISTORY  2008    Throat cancer resection      Social History     Tobacco Use   • Smoking status: Former Smoker     Packs/day: 1.00     Years: 25.00     Pack years: 25.00     Types: Cigarettes     Quit date:      Years since quittin.1   • Smokeless tobacco: Former User     Quit date:    Vaping Use   • Vaping Use: Never used   Substance  "Use Topics   • Alcohol use: Not Currently   • Drug use: Never     Family History   Problem Relation Age of Onset   • No Known Problems Mother    • Heart attack Father    • Heart attack Maternal Grandfather    • Heart attack Paternal Grandfather      Health Maintenance Due   Topic Date Due   • ZOSTER VACCINE (1 of 2) Never done   • TDAP/TD VACCINES (2 - Tdap) 05/21/2013   • HEPATITIS C SCREENING  Never done   • ANNUAL WELLNESS VISIT  Never done   • COVID-19 Vaccine (3 - Booster for Moderna series) 09/02/2021      Immunization History   Administered Date(s) Administered   • COVID-19 (MODERNA) 1st, 2nd, 3rd Dose Only 02/24/2021, 04/02/2021   • Fluzone High Dose =>65 Years ([employer] ONLY) 09/04/2020   • Influenza TIV (IM) 11/01/2017   • Influenza, Unspecified 09/22/2020   • Pneumococcal Conjugate 13-Valent (PCV13) 01/11/2021   • Pneumococcal Polysaccharide (PPSV23) 11/13/2009, 03/28/2019   • Td 05/21/2003        Objective     Vitals:    03/04/22 1016   BP: 146/73   BP Location: Right arm   Patient Position: Sitting   Cuff Size: Adult   Pulse: 64   Temp: 98.3 °F (36.8 °C)   TempSrc: Oral   SpO2: 96%   Weight: 72.3 kg (159 lb 6.4 oz)   Height: 175.3 cm (69.02\")     Body mass index is 23.53 kg/m².     Physical Exam  Vitals reviewed.   Constitutional:       Appearance: Normal appearance.   HENT:      Head: Normocephalic.   Eyes:      Pupils: Pupils are equal, round, and reactive to light.   Cardiovascular:      Rate and Rhythm: Normal rate and regular rhythm.      Heart sounds: No murmur heard.      Pulmonary:      Effort: Pulmonary effort is normal.      Breath sounds: Normal breath sounds.   Musculoskeletal:         General: Normal range of motion.   Neurological:      Mental Status: He is alert.   Psychiatric:         Mood and Affect: Mood normal.         Behavior: Behavior normal.           Result Review :                               Assessment and Plan      Diagnoses and all orders for this visit:    1. Cervical root " disorders, not elsewhere classified (Primary)  Comments:  continue to follow up with pain management as recommended     2. Hyperlipidemia, unspecified hyperlipidemia type    3. Primary insomnia  Assessment & Plan:  Continue current treatment with trazodone    Orders:  -     traZODone (DESYREL) 50 MG tablet; Take 1-2 tablets by mouth At Night As Needed for Sleep.  Dispense: 180 tablet; Refill: 1    4. Cerebral infarction due to unspecified occlusion or stenosis of unspecified cerebral artery (HCC)  Assessment & Plan:  If symptoms continue or worsen, to ER  - continue current treatment     Orders:  -     metoprolol tartrate (LOPRESSOR) 25 MG tablet; Take 1 tablet by mouth 2 (Two) Times a Day.  Dispense: 180 tablet; Refill: 1  -     Xarelto 20 MG tablet; Take 1 tablet by mouth Daily With Dinner.  Dispense: 90 tablet; Refill: 1    5. Essential (primary) hypertension  Assessment & Plan:  Hypertension is unchanged.  Continue current treatment regimen.  Blood pressure will be reassessed in 4 weeks.  Consider medication adjustment if BP continues to remain elevated    Orders:  -     amLODIPine (NORVASC) 10 MG tablet; Take 1 tablet by mouth Daily.  Dispense: 90 tablet; Refill: 1  -     hydroCHLOROthiazide (MICROZIDE) 12.5 MG capsule; Take 1 capsule by mouth Daily.  Dispense: 90 capsule; Refill: 1  -     losartan (COZAAR) 100 MG tablet; Take 1 tablet by mouth Daily.  Dispense: 90 tablet; Refill: 1  -     metoprolol tartrate (LOPRESSOR) 25 MG tablet; Take 1 tablet by mouth 2 (Two) Times a Day.  Dispense: 180 tablet; Refill: 1            Follow Up     Return in about 4 weeks (around 4/1/2022) for Annual physical.

## 2022-03-04 NOTE — ASSESSMENT & PLAN NOTE
Hypertension is unchanged.  Continue current treatment regimen.  Blood pressure will be reassessed in 4 weeks.  Consider medication adjustment if BP continues to remain elevated

## 2022-03-29 DIAGNOSIS — I10 ESSENTIAL (PRIMARY) HYPERTENSION: ICD-10-CM

## 2022-03-30 RX ORDER — HYDROCHLOROTHIAZIDE 12.5 MG/1
CAPSULE, GELATIN COATED ORAL
Qty: 90 CAPSULE | Refills: 0 | Status: SHIPPED | OUTPATIENT
Start: 2022-03-30 | End: 2022-10-24

## 2022-04-05 ENCOUNTER — TELEPHONE (OUTPATIENT)
Dept: FAMILY MEDICINE CLINIC | Age: 77
End: 2022-04-05

## 2022-04-05 NOTE — TELEPHONE ENCOUNTER
Caller: MARCELLO WITH [employer]     Relationship: SURGERY CLEARANCE     Best call back number: [phone]    What is the best time to reach you: ANY     Who are you requesting to speak with (clinical staff, provider,  specific staff member): CLINICAL         What was the call regarding: SURGERY CLEARANCE UPDATE NEEDED. CALLER WANTED TO KNOW IF IT WAS OKAY FOR PATIENT TO HOLD OFF ON TAKING Xarelto 20 MG tablet  2 DAYS PRIOR TO HIS IN OFFICE PROCEDURE  ON 04/20/2022. CALLER STATES THAT A MEDICAL CLEARANCE WAS FAXED OVER ON 03/29/2022    Do you require a callback: YES

## 2022-04-12 NOTE — TELEPHONE ENCOUNTER
I have the form and it does not state how long they will be leaving him off the Xarelto, only that he is to stop 2 days prior.  I need to know how long they are wanting him off the medication for the procedure he will be having

## 2022-04-15 ENCOUNTER — LAB (OUTPATIENT)
Dept: LAB | Facility: HOSPITAL | Age: 77
End: 2022-04-15

## 2022-04-15 ENCOUNTER — OFFICE VISIT (OUTPATIENT)
Dept: FAMILY MEDICINE CLINIC | Age: 77
End: 2022-04-15

## 2022-04-15 VITALS
DIASTOLIC BLOOD PRESSURE: 73 MMHG | BODY MASS INDEX: 23.25 KG/M2 | SYSTOLIC BLOOD PRESSURE: 154 MMHG | WEIGHT: 157 LBS | HEIGHT: 69 IN | HEART RATE: 69 BPM

## 2022-04-15 DIAGNOSIS — M19.90 ARTHRITIS: ICD-10-CM

## 2022-04-15 DIAGNOSIS — H57.89 DISCHARGE OF EYE, LEFT: ICD-10-CM

## 2022-04-15 DIAGNOSIS — H61.21 IMPACTED CERUMEN OF RIGHT EAR: ICD-10-CM

## 2022-04-15 DIAGNOSIS — Z13.6 SCREENING FOR CARDIOVASCULAR CONDITION: ICD-10-CM

## 2022-04-15 DIAGNOSIS — E03.8 OTHER SPECIFIED HYPOTHYROIDISM: ICD-10-CM

## 2022-04-15 DIAGNOSIS — I10 ESSENTIAL (PRIMARY) HYPERTENSION: ICD-10-CM

## 2022-04-15 DIAGNOSIS — Z11.59 SCREENING FOR VIRAL DISEASE: ICD-10-CM

## 2022-04-15 DIAGNOSIS — Z00.00 MEDICARE ANNUAL WELLNESS VISIT, SUBSEQUENT: Primary | ICD-10-CM

## 2022-04-15 LAB
ALBUMIN SERPL-MCNC: 4.7 G/DL (ref 3.5–5.2)
ALBUMIN/GLOB SERPL: 1.7 G/DL
ALP SERPL-CCNC: 135 U/L (ref 39–117)
ALT SERPL W P-5'-P-CCNC: 29 U/L (ref 1–41)
ANION GAP SERPL CALCULATED.3IONS-SCNC: 10.3 MMOL/L (ref 5–15)
AST SERPL-CCNC: 33 U/L (ref 1–40)
BILIRUB SERPL-MCNC: 0.4 MG/DL (ref 0–1.2)
BUN SERPL-MCNC: 26 MG/DL (ref 8–23)
BUN/CREAT SERPL: 19 (ref 7–25)
CALCIUM SPEC-SCNC: 10 MG/DL (ref 8.6–10.5)
CHLORIDE SERPL-SCNC: 100 MMOL/L (ref 98–107)
CHOLEST SERPL-MCNC: 155 MG/DL (ref 0–200)
CO2 SERPL-SCNC: 27.7 MMOL/L (ref 22–29)
CREAT SERPL-MCNC: 1.37 MG/DL (ref 0.76–1.27)
EGFRCR SERPLBLD CKD-EPI 2021: 53.5 ML/MIN/1.73
GLOBULIN UR ELPH-MCNC: 2.7 GM/DL
GLUCOSE SERPL-MCNC: 115 MG/DL (ref 65–99)
HCV AB SER DONR QL: NORMAL
HDLC SERPL-MCNC: 54 MG/DL (ref 40–60)
LDLC SERPL CALC-MCNC: 85 MG/DL (ref 0–100)
LDLC/HDLC SERPL: 1.55 {RATIO}
POTASSIUM SERPL-SCNC: 4.6 MMOL/L (ref 3.5–5.2)
PROT SERPL-MCNC: 7.4 G/DL (ref 6–8.5)
SODIUM SERPL-SCNC: 138 MMOL/L (ref 136–145)
TRIGL SERPL-MCNC: 86 MG/DL (ref 0–150)
TSH SERPL DL<=0.05 MIU/L-ACNC: 5.6 UIU/ML (ref 0.27–4.2)
VLDLC SERPL-MCNC: 16 MG/DL (ref 5–40)

## 2022-04-15 PROCEDURE — 36415 COLL VENOUS BLD VENIPUNCTURE: CPT

## 2022-04-15 PROCEDURE — 86803 HEPATITIS C AB TEST: CPT

## 2022-04-15 PROCEDURE — 96160 PT-FOCUSED HLTH RISK ASSMT: CPT | Performed by: NURSE PRACTITIONER

## 2022-04-15 PROCEDURE — 1170F FXNL STATUS ASSESSED: CPT | Performed by: NURSE PRACTITIONER

## 2022-04-15 PROCEDURE — 80061 LIPID PANEL: CPT

## 2022-04-15 PROCEDURE — 80053 COMPREHEN METABOLIC PANEL: CPT

## 2022-04-15 PROCEDURE — 84443 ASSAY THYROID STIM HORMONE: CPT

## 2022-04-15 PROCEDURE — G0439 PPPS, SUBSEQ VISIT: HCPCS | Performed by: NURSE PRACTITIONER

## 2022-04-15 PROCEDURE — 1159F MED LIST DOCD IN RCRD: CPT | Performed by: NURSE PRACTITIONER

## 2022-04-15 PROCEDURE — 69209 REMOVE IMPACTED EAR WAX UNI: CPT | Performed by: NURSE PRACTITIONER

## 2022-04-15 RX ORDER — ERYTHROMYCIN 5 MG/G
OINTMENT OPHTHALMIC
COMMUNITY
Start: 2022-03-28 | End: 2022-10-04 | Stop reason: ALTCHOICE

## 2022-04-15 NOTE — PROGRESS NOTES
The ABCs of the Annual Wellness Visit  Subsequent Medicare Wellness Visit    Chief Complaint   Patient presents with   • Medicare Wellness-subsequent      Subjective    History of Present Illness:  Dixon Amador is a 76 y.o. male who presents for a Subsequent Medicare Wellness Visit.    The following portions of the patient's history were reviewed and   updated as appropriate: allergies, current medications, past family history, past medical history, past social history, past surgical history and problem list.    Compared to one year ago, the patient feels his physical   health is the same.    Compared to one year ago, the patient feels his mental   health is the same.    Recent Hospitalizations:  He was not admitted to the hospital during the last year.       Current Medical Providers:  Patient Care Team:  Shayy Galaviz APRN as PCP - General (Nurse Practitioner)  Ronnell Pantoja MD as Consulting Physician (Hematology and Oncology)  Sarath Kamara MD as Consulting Physician (Neurology)  Cathleen Oviedo APRN as Nurse Practitioner (Dermatology)  Dixon Vela MD as Consulting Physician (Cardiology)  Andrae Yee MD as Consulting Physician (Otolaryngology)    Outpatient Medications Prior to Visit   Medication Sig Dispense Refill   • amLODIPine (NORVASC) 10 MG tablet Take 1 tablet by mouth Daily. 90 tablet 1   • erythromycin (ROMYCIN) 5 MG/GM ophthalmic ointment      • hydroCHLOROthiazide (MICROZIDE) 12.5 MG capsule TAKE 1 CAPSULE BY MOUTH EVERY DAY 90 capsule 0   • HYDROcodone-acetaminophen (NORCO) 5-325 MG per tablet As Needed.     • levothyroxine (SYNTHROID, LEVOTHROID) 137 MCG tablet TAKE 1 TABLET BY MOUTH DAILY ON AN EMPTY STOMACH 90 tablet 1   • losartan (COZAAR) 100 MG tablet Take 1 tablet by mouth Daily. 90 tablet 1   • Melatonin 10 MG tablet Take  by mouth Every Night.     • metoprolol tartrate (LOPRESSOR) 25 MG tablet Take 1 tablet by mouth 2 (Two) Times a Day. 180 tablet 1   • Polyethylene  Glycol 1000 powder As Needed.     • traZODone (DESYREL) 50 MG tablet Take 1-2 tablets by mouth At Night As Needed for Sleep. 180 tablet 1   • Xarelto 20 MG tablet Take 1 tablet by mouth Daily With Dinner. 90 tablet 1   • ezetimibe (ZETIA) 10 MG tablet Take 1 tablet by mouth Daily for 30 days. 30 tablet 11     No facility-administered medications prior to visit.       Opioid medication/s are on active medication list.  and I have evaluated his active treatment plan and pain score trends (see table).  There were no vitals filed for this visit.  I have reviewed the chart for potential of high risk medication and harmful drug interactions in the elderly.            Aspirin is not on active medication list.  Aspirin use is indicated based on review of current medical condition/s. Pros and cons of this therapy have been discussed with this patient. Benefits of this medication outweigh potential harm.  Patient has been instructed to start taking this medication..    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  ACP discussion was held with the patient during this visit. Patient has an advance directive in EMR which is still valid.  "    Review of Systems   Constitutional: Negative for appetite change, chills, fatigue and fever.   HENT: Positive for hearing loss and trouble swallowing (chronic, post-throat cancer). Negative for congestion.    Eyes: Positive for discharge (left eye, clear watery discharge, having biopsy done later this month). Negative for visual disturbance.   Respiratory: Negative for cough and shortness of breath.    Cardiovascular: Negative for chest pain and leg swelling.   Gastrointestinal: Positive for constipation (chronic, takes Miralax and stool softener prn). Negative for abdominal pain, diarrhea, nausea and vomiting.   Genitourinary: Negative for difficulty urinating.   Musculoskeletal: Positive for back pain (sees pain mgmt, takes Norco).   Skin: Negative for rash and wound.   Allergic/Immunologic: Negative for environmental allergies.   Neurological: Negative for dizziness, syncope, weakness and numbness.   Psychiatric/Behavioral: Negative for dysphoric mood and sleep disturbance. The patient is not nervous/anxious.         Objective    Vitals:    04/15/22 0922   BP: 154/73   BP Location: Left arm   Patient Position: Sitting   Pulse: 69   Weight: 71.2 kg (157 lb)   Height: 175.3 cm (69.02\")     BMI Readings from Last 1 Encounters:   04/15/22 23.17 kg/m²   BMI is within normal parameters. No follow-up required.    Does the patient have evidence of cognitive impairment? No    Physical Exam  Vitals reviewed.   Constitutional:       Appearance: Normal appearance.   HENT:      Head: Normocephalic.      Right Ear: There is impacted cerumen.      Left Ear: Tympanic membrane and ear canal normal. There is no impacted cerumen.      Nose: Nose normal.      Mouth/Throat:      Mouth: Mucous membranes are moist.      Pharynx: Posterior oropharyngeal erythema present.   Eyes:      General:         Right eye: No discharge.         Left eye: Discharge (clear, watery) present.     Conjunctiva/sclera: Conjunctivae normal.      " Pupils: Pupils are equal, round, and reactive to light.   Cardiovascular:      Rate and Rhythm: Normal rate and regular rhythm.      Pulses: Normal pulses.      Heart sounds: Normal heart sounds. No murmur heard.  Pulmonary:      Effort: Pulmonary effort is normal.      Breath sounds: Normal breath sounds.   Abdominal:      General: Bowel sounds are normal.      Palpations: Abdomen is soft.   Musculoskeletal:         General: Normal range of motion.      Cervical back: Normal range of motion and neck supple. No tenderness.   Lymphadenopathy:      Cervical: No cervical adenopathy.   Skin:     General: Skin is warm and dry.      Capillary Refill: Capillary refill takes less than 2 seconds.   Neurological:      Mental Status: He is alert and oriented to person, place, and time.      Sensory: Sensation is intact.      Motor: Weakness (slight residual left-sided weakness) present.      Coordination: Coordination is intact.      Gait: Gait is intact.   Psychiatric:         Mood and Affect: Mood normal.         Behavior: Behavior normal.       Lab Results   Component Value Date    TRIG 86 04/15/2022    HDL 54 04/15/2022    LDL 85 04/15/2022    VLDL 16 04/15/2022            HEALTH RISK ASSESSMENT    Smoking Status:  Social History     Tobacco Use   Smoking Status Former Smoker   • Packs/day: 1.00   • Years: 25.00   • Pack years: 25.00   • Types: Cigarettes   • Quit date:    • Years since quittin.3   Smokeless Tobacco Former User   • Quit date:      Alcohol Consumption:  Social History     Substance and Sexual Activity   Alcohol Use Not Currently     Fall Risk Screen:    [facility] Fall Risk Assessment has not been completed.    Depression Screening:  PHQ-2/PHQ-9 Depression Screening 4/15/2022   Retired PHQ-9 Total Score -   Retired Total Score -   Little Interest or Pleasure in Doing Things 0-->not at all   Feeling Down, Depressed or Hopeless 0-->not at all   PHQ-9: Brief Depression Severity Measure Score 0        Health Habits and Functional and Cognitive Screening:  Functional & Cognitive Status 4/15/2022   Do you have difficulty preparing food and eating? No   Do you have difficulty bathing yourself, getting dressed or grooming yourself? No   Do you have difficulty using the toilet? No   Do you have difficulty moving around from place to place? No   Do you have trouble with steps or getting out of a bed or a chair? No   Current Diet Well Balanced Diet   Dental Exam Up to date   Eye Exam Up to date   Exercise (times per week) 7 times per week   Current Exercises Include Walking   Do you need help using the phone?  No   Are you deaf or do you have serious difficulty hearing?  No   Do you need help with transportation? No   Do you need help shopping? No   Do you need help preparing meals?  No   Do you need help with housework?  No   Do you need help with laundry? No   Do you need help taking your medications? No   Do you need help managing money? No   Do you ever drive or ride in a car without wearing a seat belt? No   Have you felt unusual stress, anger or loneliness in the last month? No   Who do you live with? Spouse   If you need help, do you have trouble finding someone available to you? No   Do you have difficulty concentrating, remembering or making decisions? No       Age-appropriate Screening Schedule:  Refer to the list below for future screening recommendations based on patient's age, sex and/or medical conditions. Orders for these recommended tests are listed in the plan section. The patient has been provided with a written plan.    Health Maintenance   Topic Date Due   • ZOSTER VACCINE (1 of 2) Never done   • TDAP/TD VACCINES (2 - Tdap) 05/21/2013   • INFLUENZA VACCINE  08/01/2022   • LIPID PANEL  04/15/2023              Assessment/Plan   CMS Preventative Services Quick Reference  Risk Factors Identified During Encounter  Dementia/Memory   The above risks/problems have been discussed with the  patient.  Follow up actions/plans if indicated are seen below in the Assessment/Plan Section.  Pertinent information has been shared with the patient in the After Visit Summary.    Diagnoses and all orders for this visit:    1. Medicare annual wellness visit, subsequent (Primary)  Comments:  discussed recommendations for screenings, annual wellness recommended, follow up in 6 months for chronic conditions     2. Essential (primary) hypertension  Comments:  Has not taken antihypertensives today, instructed to take when he returns home and check BP at home later today    3. Other specified hypothyroidism  Comments:  Checking TSH today, continue current treatment with levothyroxine  Orders:  -     TSH; Future    4. Arthritis  Comments:  Continue f/u with pain management as directed    5. Discharge of eye, left  Comments:  Biopsy later this month, stop Xarelto 3 days prior, can resume 24 hrs post-op      6. Impacted cerumen of right ear  Comments:  Treated in office  Orders:  -     Cerumen Removal    7. Screening for cardiovascular condition  -     Comprehensive metabolic panel; Future  -     Lipid panel; Future    8. Screening for viral disease  -     Hepatitis C antibody; Future        Follow Up:   No follow-ups on file.     An After Visit Summary and PPPS were made available to the patient.

## 2022-04-15 NOTE — PROGRESS NOTES
Ear Cerumen Removal    Date/Time: 4/15/2022 10:45 AM  Performed by: Alyssa Bryan MA  Authorized by: Shayy Galaviz APRN     Anesthesia:  Local Anesthetic: none  Ceruminolytics applied: Ceruminolytics applied prior to the procedure.  Location details: right ear  Patient tolerance: patient tolerated the procedure well with no immediate complications  Procedure type: irrigation   Sedation:  Patient sedated: no

## 2022-04-15 NOTE — PROGRESS NOTES
Chief Complaint  Dixon Amador presents to [facility] for Medicare Wellness-subsequent    Subjective     {Problem List  Visit Diagnosis   Encounters  Notes  Medications  Labs  Result Review Imaging  Media :23}                     Review of Systems      Allergies   Allergen Reactions   • Atorvastatin Unknown - Low Severity   • Codeine Unknown - Low Severity     causes delium  causes delium        Past Medical History:   Diagnosis Date   • Cerebral infarction due to unspecified occlusion or stenosis of unspecified cerebral artery (HCC)    • Cervical root disorders, not elsewhere classified    • Cervicalgia    • Constipation, unspecified    • Cough    • Dysphagia, unspecified    • Essential (primary) hypertension    • Hypokalemia    • 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    • Hypothyroidism, unspecified     AFTER RADIATION   • Malignant neoplasm of pharynx, unspecified (HCC)    • Other long term (current) drug therapy    • Other specified anxiety disorders    • Other specified disorders of bone, multiple sites    • Pneumonia    • Polycythemia vera (HCC)    • Primary insomnia    • Radiculopathy, lumbar region    • 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, left hand (HCC)    • 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, right hand (HCC)    • Shortness of breath    • Syncope and collapse    • Tonsil cancer (HCC)     CHEMO AND RADIATION; OVER 11 YRS AGO; NOW CLEARD     Current Outpatient Medications   Medication Sig Dispense Refill   • amLODIPine (NORVASC) 10 MG tablet Take 1 tablet by mouth Daily. 90 tablet 1   • erythromycin (ROMYCIN) 5 MG/GM ophthalmic ointment      • hydroCHLOROthiazide (MICROZIDE) 12.5 MG capsule TAKE 1 CAPSULE BY MOUTH EVERY DAY 90 capsule 0   • HYDROcodone-acetaminophen (NORCO) 5-325 MG per tablet As Needed.     • levothyroxine (SYNTHROID, LEVOTHROID) 137 MCG tablet TAKE 1 TABLET BY MOUTH DAILY ON AN EMPTY STOMACH 90  tablet 1   • losartan (COZAAR) 100 MG tablet Take 1 tablet by mouth Daily. 90 tablet 1   • Melatonin 10 MG tablet Take  by mouth Every Night.     • metoprolol tartrate (LOPRESSOR) 25 MG tablet Take 1 tablet by mouth 2 (Two) Times a Day. 180 tablet 1   • Polyethylene Glycol 1000 powder As Needed.     • traZODone (DESYREL) 50 MG tablet Take 1-2 tablets by mouth At Night As Needed for Sleep. 180 tablet 1   • Xarelto 20 MG tablet Take 1 tablet by mouth Daily With Dinner. 90 tablet 1   • ezetimibe (ZETIA) 10 MG tablet Take 1 tablet by mouth Daily for 30 days. 30 tablet 11     No current facility-administered medications for this visit.     Past Surgical History:   Procedure Laterality Date   • BACK SURGERY  1987    x2   • OTHER SURGICAL HISTORY      Throat cancer resection      Social History     Tobacco Use   • Smoking status: Former Smoker     Packs/day: 1.00     Years: 25.00     Pack years: 25.00     Types: Cigarettes     Quit date:      Years since quittin.3   • Smokeless tobacco: Former User     Quit date:    Vaping Use   • Vaping Use: Never used   Substance Use Topics   • Alcohol use: Not Currently   • Drug use: Never     Family History   Problem Relation Age of Onset   • No Known Problems Mother    • Heart attack Father    • Heart attack Maternal Grandfather    • Heart attack Paternal Grandfather      Health Maintenance Due   Topic Date Due   • ZOSTER VACCINE (1 of 2) Never done   • TDAP/TD VACCINES (2 - Tdap) 2013   • HEPATITIS C SCREENING  Never done   • LIPID PANEL  2022      Immunization History   Administered Date(s) Administered   • COVID-19 (MODERNA) 1st, 2nd, 3rd Dose Only 2021, 2021, 2021   • Fluzone High Dose =>65 Years (Vaxcare ONLY) 2020   • Fluzone High-Dose 65+yrs 2020, 2021   • Influenza TIV (IM) 2017   • Influenza, Unspecified 2020   • Pneumococcal Conjugate 13-Valent (PCV13) 2021   • Pneumococcal Polysaccharide  "(PPSV23) 11/13/2009, 03/28/2019   • Td 05/21/2003        Objective     Vitals:    04/15/22 0922   BP: 154/73   BP Location: Left arm   Patient Position: Sitting   Pulse: 69   Weight: 71.2 kg (157 lb)   Height: 175.3 cm (69.02\")     Body mass index is 23.17 kg/m².     Physical Exam      Result Review :{Labs  Result Review  Imaging  Med Tab  Media :23}     {The following data was reviewed by (Optional):89625}        {Ambulatory Labs (Optional):75597}    {Data reviewed (Optional):71949:::1}              Assessment and Plan {CC Problem List  Visit Diagnosis  ROS  Review (Popup)  Health Maintenance  Quality  BestPractice  Medications  SmartSets  SnapShot Encounters  Media :23}     There are no diagnoses linked to this encounter.      {Time Spent (Optional):90346}    Follow Up {Instructions Charge Capture  Follow-up Communications :23}    No follow-ups on file.             "

## 2022-05-08 ENCOUNTER — APPOINTMENT (OUTPATIENT)
Dept: CARDIOLOGY | Facility: HOSPITAL | Age: 77
End: 2022-05-08

## 2022-05-08 ENCOUNTER — HOSPITAL ENCOUNTER (EMERGENCY)
Facility: HOSPITAL | Age: 77
Discharge: HOME | End: 2022-05-08
Attending: EMERGENCY MEDICINE | Admitting: EMERGENCY MEDICINE

## 2022-05-08 VITALS
RESPIRATION RATE: 18 BRPM | OXYGEN SATURATION: 96 % | WEIGHT: 157.85 LBS | BODY MASS INDEX: 23.92 KG/M2 | DIASTOLIC BLOOD PRESSURE: 74 MMHG | HEIGHT: 68 IN | TEMPERATURE: 97.9 F | SYSTOLIC BLOOD PRESSURE: 143 MMHG | HEART RATE: 78 BPM

## 2022-05-08 DIAGNOSIS — R60.0 EDEMA OF RIGHT LOWER LEG: Primary | ICD-10-CM

## 2022-05-08 DIAGNOSIS — M71.21 BAKER'S CYST OF KNEE, RIGHT: ICD-10-CM

## 2022-05-08 LAB
BH CV LOWER VASCULAR LEFT COMMON FEMORAL AUGMENT: NORMAL
BH CV LOWER VASCULAR LEFT COMMON FEMORAL COMPETENT: NORMAL
BH CV LOWER VASCULAR LEFT COMMON FEMORAL COMPRESS: NORMAL
BH CV LOWER VASCULAR LEFT COMMON FEMORAL PHASIC: NORMAL
BH CV LOWER VASCULAR LEFT COMMON FEMORAL SPONT: NORMAL
BH CV LOWER VASCULAR RIGHT COMMON FEMORAL AUGMENT: NORMAL
BH CV LOWER VASCULAR RIGHT COMMON FEMORAL COMPETENT: NORMAL
BH CV LOWER VASCULAR RIGHT COMMON FEMORAL COMPRESS: NORMAL
BH CV LOWER VASCULAR RIGHT COMMON FEMORAL PHASIC: NORMAL
BH CV LOWER VASCULAR RIGHT COMMON FEMORAL SPONT: NORMAL
BH CV LOWER VASCULAR RIGHT DISTAL FEMORAL COMPRESS: NORMAL
BH CV LOWER VASCULAR RIGHT GASTRONEMIUS COMPRESS: NORMAL
BH CV LOWER VASCULAR RIGHT GREATER SAPH AK COMPRESS: NORMAL
BH CV LOWER VASCULAR RIGHT GREATER SAPH BK COMPRESS: NORMAL
BH CV LOWER VASCULAR RIGHT LESSER SAPH COMPRESS: NORMAL
BH CV LOWER VASCULAR RIGHT MID FEMORAL AUGMENT: NORMAL
BH CV LOWER VASCULAR RIGHT MID FEMORAL COMPETENT: NORMAL
BH CV LOWER VASCULAR RIGHT MID FEMORAL COMPRESS: NORMAL
BH CV LOWER VASCULAR RIGHT MID FEMORAL PHASIC: NORMAL
BH CV LOWER VASCULAR RIGHT MID FEMORAL SPONT: NORMAL
BH CV LOWER VASCULAR RIGHT PERONEAL COMPRESS: NORMAL
BH CV LOWER VASCULAR RIGHT POPLITEAL AUGMENT: NORMAL
BH CV LOWER VASCULAR RIGHT POPLITEAL COMPETENT: NORMAL
BH CV LOWER VASCULAR RIGHT POPLITEAL COMPRESS: NORMAL
BH CV LOWER VASCULAR RIGHT POPLITEAL PHASIC: NORMAL
BH CV LOWER VASCULAR RIGHT POPLITEAL SPONT: NORMAL
BH CV LOWER VASCULAR RIGHT POSTERIOR TIBIAL COMPRESS: NORMAL
BH CV LOWER VASCULAR RIGHT PROXIMAL FEMORAL COMPRESS: NORMAL
BH CV LOWER VASCULAR RIGHT SAPHENOFEMORAL JUNCTION COMPRESS: NORMAL
HOLD SPECIMEN: NORMAL
HOLD SPECIMEN: NORMAL
MAXIMAL PREDICTED HEART RATE: 144 BPM
STRESS TARGET HR: 122 BPM
WHOLE BLOOD HOLD SPECIMEN: NORMAL
WHOLE BLOOD HOLD SPECIMEN: NORMAL

## 2022-05-08 PROCEDURE — 93971 EXTREMITY STUDY: CPT | Performed by: SURGERY

## 2022-05-08 PROCEDURE — 99283 EMERGENCY DEPT VISIT LOW MDM: CPT

## 2022-05-08 PROCEDURE — 93971 EXTREMITY STUDY: CPT

## 2022-05-08 RX ORDER — SODIUM CHLORIDE 0.9 % (FLUSH) 0.9 %
10 SYRINGE (ML) INJECTION AS NEEDED
Status: DISCONTINUED | OUTPATIENT
Start: 2022-05-08 | End: 2022-05-08 | Stop reason: HOSPADM

## 2022-05-08 NOTE — DISCHARGE INSTRUCTIONS
No blood clots or DVTs were found but you obviously have some swelling in the leg and it looks like you have a area of swelling behind the right knee concerning for Baker's cyst, for which you can keep the leg elevated and call your orthopedic doctor Monday for a follow-up appointment to be seen.      You can continue taking your Xarelto as previously prescribed.

## 2022-05-08 NOTE — SIGNIFICANT NOTE
The patient demonstrated no evidence of a DVT in his right leg on today's scan; however, it does appear he has a Baker's cyst/hematoma in his right pop fossa and posterior prox calf region.  His right leg began to swell last evening and thought it may be due to his arthritis.  He's been taking Xarelto for six years.  The report was called to PERFECTO Banegas.

## 2022-05-08 NOTE — ED PROVIDER NOTES
Time: 07:20 EDT  Arrived by: car  Chief Complaint: leg swelling  History provided by: patient  History is limited by: N/A     History of Present Illness:  Patient is a 76 y.o. year old male who presents to the emergency department with RIGHT LEG SWELLING which began yesterday around 2200. He states this morning the swelling was worse. He denies any pain or swelling to his upper leg. He denies any recent injuries.     He denies any chest pain, shortness of breath or fever. He denies hemoptysis.     Hx of DVT and is on Xarelto. He states he missed 4 days of Xarelto for a procedure a few weeks ago.          Leg Swelling  Location:  R leg  Severity:  Moderate  Duration:  1 day  Associated symptoms: no abdominal pain, no chest pain, no cough, no diarrhea, no fever, no headaches, no nausea, no rash, no shortness of breath and no vomiting            Patient Care Team  Primary Care Provider: Shayy Galaviz APRN    Past Medical History:     Allergies   Allergen Reactions   • Atorvastatin Unknown - Low Severity   • Codeine Unknown - Low Severity     causes delium  causes delium       Past Medical History:   Diagnosis Date   • Cerebral infarction due to unspecified occlusion or stenosis of unspecified cerebral artery (HCC)    • Cervical root disorders, not elsewhere classified    • Cervicalgia    • Constipation, unspecified    • Cough    • Dysphagia, unspecified    • Essential (primary) hypertension    • Hypokalemia    • 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    • Hypothyroidism, unspecified     AFTER RADIATION   • Malignant neoplasm of pharynx, unspecified ([facility])    • Other long term (current) drug therapy    • Other specified anxiety disorders    • Other specified disorders of bone, multiple sites    • Pneumonia    • Polycythemia vera ([facility])    • Primary insomnia    • Radiculopathy, lumbar region    • 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, left hand ([facility])    • Rheumatoid arthritis without  rheumatoid factor, right hand (HCC)    • Shortness of breath    • Syncope and collapse    • Tonsil cancer (HCC)     CHEMO AND RADIATION; OVER 11 YRS AGO; NOW CLEARD     Past Surgical History:   Procedure Laterality Date   • BACK SURGERY  1987    x2   • OTHER SURGICAL HISTORY  2008    Throat cancer resection     Family History   Problem Relation Age of Onset   • No Known Problems Mother    • Heart attack Father    • Heart attack Maternal Grandfather    • Heart attack Paternal Grandfather        Home Medications:  Prior to Admission medications    Medication Sig Start Date End Date Taking? Authorizing Provider   Xarelto 20 MG tablet Take 1 tablet by mouth Daily With Dinner. 3/4/22  Yes Shayy Galaviz APRN   amLODIPine (NORVASC) 10 MG tablet Take 1 tablet by mouth Daily. 3/4/22   Shayy Galaviz APRN   erythromycin (ROMYCIN) 5 MG/GM ophthalmic ointment  3/28/22   Linda Montero MD   ezetimibe (ZETIA) 10 MG tablet Take 1 tablet by mouth Daily for 30 days. 8/29/21 9/28/21  Shayy Galaviz APRN   hydroCHLOROthiazide (MICROZIDE) 12.5 MG capsule TAKE 1 CAPSULE BY MOUTH EVERY DAY 3/30/22   Shayy Galaviz APRN   HYDROcodone-acetaminophen (NORCO) 5-325 MG per tablet As Needed.    Linda Montero MD   levothyroxine (SYNTHROID, LEVOTHROID) 137 MCG tablet TAKE 1 TABLET BY MOUTH DAILY ON AN EMPTY STOMACH 3/4/22   Shayy Galaviz APRN   losartan (COZAAR) 100 MG tablet Take 1 tablet by mouth Daily. 3/4/22   Shayy Galaviz APRN   Melatonin 10 MG tablet Take  by mouth Every Night. 3/22/21   Linda Montero MD   metoprolol tartrate (LOPRESSOR) 25 MG tablet Take 1 tablet by mouth 2 (Two) Times a Day. 3/4/22   Shayy Galaviz APRN   Polyethylene Glycol 1000 powder As Needed.    Linda Montero MD   traZODone (DESYREL) 50 MG tablet Take 1-2 tablets by mouth At Night As Needed for Sleep. 3/4/22   Shayy Galaviz APRN        Social History:   Social History     Tobacco Use   • Smoking status: Former  "Smoker     Packs/day: 1.00     Years: 25.00     Pack years: 25.00     Types: Cigarettes     Quit date:      Years since quittin.3   • Smokeless tobacco: Former User     Quit date:    Vaping Use   • Vaping Use: Never used   Substance Use Topics   • Alcohol use: Not Currently   • Drug use: Never        Review of Systems:  Review of Systems   Constitutional: Negative for chills and fever.   HENT: Negative for ear discharge.    Eyes: Negative for photophobia.   Respiratory: Negative for cough and shortness of breath.    Cardiovascular: Positive for leg swelling. Negative for chest pain.   Gastrointestinal: Negative for abdominal pain, diarrhea, nausea and vomiting.   Genitourinary: Negative for dysuria.   Musculoskeletal: Negative for back pain and neck pain.   Skin: Negative for rash.   Neurological: Negative for headaches.        Physical Exam:  /74   Pulse 78   Temp 97.9 °F (36.6 °C) (Oral)   Resp 18   Ht 172.7 cm (68\")   Wt 71.6 kg (157 lb 13.6 oz)   SpO2 96%   BMI 24.00 kg/m²     Physical Exam     General: Awake alert and in no obvious distress.     HEENT: Head normocephalic atraumatic, eyes PERRLA EOMI, nose normal, oropharynx normal.    Neck: Supple full range of motion, no meningismus, no lymphadenopathy    Heart: Regular rate and rhythm, no murmurs or rubs, 2+ radial pulses bilaterally    Lungs: Clear to auscultation bilaterally without wheezes or crackles, no respiratory distress    Abdomen: Soft, nontender, nondistended, no rebound or guarding    Skin: Right lower extremity: mildly erythematous from the knee down. Warm, dry, no rash    Musculoskeletal: Right lower extremity: 2+ pitting edema from the right knee down with good pulses and sensation. Normal range of motion    Neurologic: Oriented x3, no motor deficits no sensory deficits    Psychiatric: Mood appears stable, no psychosis          Medications in the Emergency Department:  Medications - No data to display     Labs  Lab " Results (last 24 hours)     ** No results found for the last 24 hours. **           Imaging:  Duplex Venous Lower Extremity - Right CAR    Result Date: 5/8/2022  · Normal right lower extremity venous duplex scan. · Right popliteal fossa fluid collection. 5 x 2 cm right popliteal/posterior calf mixed density nonvascular fluid collection of uncertain etiology. Clinical and/or alternative imaging correlation would be appropriate.        Procedures:  Procedures      Progress                            Medical Decision Making:  MDM       This patient is a pleasant 76-year-old male with previous history of DVT now presenting with right lower leg swelling and minimal discomfort since yesterday and denies any injury.    He states he did miss 4 or more days or so of his Xarelto anticoagulant because of procedure the other week.    I considered DVT the most likely thing to cause sudden onset of swelling and discomfort in a patient who missed her anticoagulant so we ordered a Doppler ultrasound today which actually came back negative for DVT but positive for some edema and probable Baker's cyst versus hematoma posterior to the knee.    He has normal pulses and no sign of infection I think can be safely discharged home with referral to orthopedics for Baker's cyst and instructions to keep leg elevated and follow-up with his doctor and resume his anticoagulation.    Final diagnoses:   Edema of right lower leg   Baker's cyst of knee, right        Disposition:  ED Disposition     ED Disposition   Discharge    Condition   Stable    Comment   --             Documentation assistance provided by Hortencia Nicolas acting as scribe for Dr. Juancarlos Green. Information recorded by the scribe was done at my direction and has been verified and validated by me.              Hortencia Nicolas  05/08/22 0780       Juancarlos Green MD  05/08/22 6106

## 2022-05-09 ENCOUNTER — TELEPHONE (OUTPATIENT)
Dept: ORTHOPEDIC SURGERY | Facility: CLINIC | Age: 77
End: 2022-05-09

## 2022-05-09 ENCOUNTER — OFFICE VISIT (OUTPATIENT)
Dept: FAMILY MEDICINE CLINIC | Age: 77
End: 2022-05-09

## 2022-05-09 VITALS
SYSTOLIC BLOOD PRESSURE: 123 MMHG | OXYGEN SATURATION: 100 % | WEIGHT: 158 LBS | HEART RATE: 98 BPM | DIASTOLIC BLOOD PRESSURE: 54 MMHG | HEIGHT: 68 IN | BODY MASS INDEX: 23.95 KG/M2

## 2022-05-09 DIAGNOSIS — M71.21 BAKER CYST, RIGHT: Primary | ICD-10-CM

## 2022-05-09 PROCEDURE — 99213 OFFICE O/P EST LOW 20 MIN: CPT | Performed by: NURSE PRACTITIONER

## 2022-05-09 RX ORDER — EZETIMIBE 10 MG/1
10 TABLET ORAL DAILY
COMMUNITY
End: 2022-09-20 | Stop reason: SDUPTHER

## 2022-05-09 NOTE — TELEPHONE ENCOUNTER
RT LEG CAGLE'S CYST/ULTRASOUND ER [facility] 5-8/NO XRAY/MEDICARE/NO PERV SURG/PT REQ ASAP APPT/[phone]

## 2022-05-09 NOTE — PROGRESS NOTES
"Assessment and Plan    Diagnoses and all orders for this visit:    1. Baker cyst, right (Primary)  Comments:  instructed to keep leg elevated , we will try to get referral appt sooner  Orders:  -     Ambulatory Referral to Orthopedic Surgery        Follow Up   Return if symptoms worsen or fail to improve.    Chief Complaint  Dixon Amador presents to [facility] for Cyst (Pt states he has a bakers cyst right calf. Was told he needs to see a surgeon, called surgeon in [place], soonest he can get in is 2 weeks. ) and Eye Problem (States he was told he has cancer in his left eye. Will be having radiation in a couple weeks.- dx by a Dr. De La Fuente, part of [facility].)    Subjective          Bakers cyst right leg- Has an appt with Dr. Moreno on 5/23  Wants to be seen earlier  The pain is rated 4/10 right now pain pressure in his lower leg- Went to ER and diagnosed with ruptured bakers cyst  (negative for DVT)    Left eye was diagnosed with melanoma   Will be going to get radiation in 1 week for treatment  Will require 4 treatments per patient report.                Review of Systems    Objective     Vitals:    05/09/22 1508   BP: 123/54   Pulse: 98   SpO2: 100%   Weight: 71.7 kg (158 lb)   Height: 172.7 cm (67.99\")     Body mass index is 24.03 kg/m².     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    • Hypothyroidism, unspecified     AFTER " RADIATION   • Malignant neoplasm of pharynx, unspecified (HCC)    • Other long term (current) drug therapy    • Other specified anxiety disorders    • Other specified disorders of bone, multiple sites    • Pneumonia    • Polycythemia vera (HCC)    • Primary insomnia    • Radiculopathy, lumbar region    • 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, left hand (HCC)    • 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, right hand (HCC)    • Shortness of breath    • Syncope and collapse    • Tonsil cancer (HCC)     CHEMO AND RADIATION; OVER 11 YRS AGO; NOW CLEARD     Current Outpatient Medications   Medication Sig Dispense Refill   • amLODIPine (NORVASC) 10 MG tablet Take 1 tablet by mouth Daily. 90 tablet 1   • erythromycin (ROMYCIN) 5 MG/GM ophthalmic ointment      • ezetimibe (ZETIA) 10 MG tablet Take 10 mg by mouth Daily.     • hydroCHLOROthiazide (MICROZIDE) 12.5 MG capsule TAKE 1 CAPSULE BY MOUTH EVERY DAY 90 capsule 0   • HYDROcodone-acetaminophen (NORCO) 5-325 MG per tablet As Needed.     • levothyroxine (SYNTHROID, LEVOTHROID) 137 MCG tablet TAKE 1 TABLET BY MOUTH DAILY ON AN EMPTY STOMACH 90 tablet 1   • losartan (COZAAR) 100 MG tablet Take 1 tablet by mouth Daily. 90 tablet 1   • Melatonin 10 MG tablet Take  by mouth Every Night.     • metoprolol tartrate (LOPRESSOR) 25 MG tablet Take 1 tablet by mouth 2 (Two) Times a Day. 180 tablet 1   • Polyethylene Glycol 1000 powder As Needed.     • traZODone (DESYREL) 50 MG tablet Take 1-2 tablets by mouth At Night As Needed for Sleep. 180 tablet 1   • Xarelto 20 MG tablet Take 1 tablet by mouth Daily With Dinner. 90 tablet 1   • ezetimibe (ZETIA) 10 MG tablet Take 1 tablet by mouth Daily for 30 days. 30 tablet 11     No current facility-administered medications for this visit.     Past Surgical History:   Procedure Laterality Date   • BACK SURGERY  1987    x2   • OTHER SURGICAL HISTORY  2008    Throat cancer resection      Social History     Tobacco Use   • Smoking  status: Former Smoker     Packs/day: 1.00     Years: 36.00     Pack years: 36.00     Types: Cigarettes     Start date:      Quit date:      Years since quittin.3   • Smokeless tobacco: Never Used   Vaping Use   • Vaping Use: Never used   Substance Use Topics   • Alcohol use: Not Currently   • Drug use: Never     Family History   Problem Relation Age of Onset   • No Known Problems Mother    • Heart attack Father    • Heart attack Maternal Grandfather    • Heart attack Paternal Grandfather      Health Maintenance Due   Topic Date Due   • ZOSTER VACCINE (1 of 2) Never done   • TDAP/TD VACCINES (2 - Tdap) 2013      Immunization History   Administered Date(s) Administered   • COVID-19 (MODERNA) 1st, 2nd, 3rd Dose Only 2021, 2021, 2021   • Fluzone High Dose =>65 Years (Vaxcare ONLY) 2020   • Fluzone High-Dose 65+yrs 2020, 2021   • Influenza TIV (IM) 2017   • Influenza, Unspecified 2020   • Pneumococcal Conjugate 13-Valent (PCV13) 2021   • Pneumococcal Polysaccharide (PPSV23) 2009, 2019   • Td 2003

## 2022-05-16 NOTE — TELEPHONE ENCOUNTER
Faxed on 4-   Physical Exam    Vital Signs: I have reviewed the initial vital signs.  Constitutional: well-nourished, appears stated age, no acute distress  Eyes: Conjunctiva pink, Sclera clear  Cardiovascular: S1 and S2, regular rate, regular rhythm, well-perfused extremities, radial pulses equal and 2+ b/l.   Respiratory: unlabored respiratory effort, clear to auscultation bilaterally no wheezing, rales and rhonchi. pt is speaking full sentences. no accessory muscle use. (+) tenderness to right chest wall without palpable deformity or crepitus. no flail chest.   Gastrointestinal: soft, non-tender, nondistended abdomen, no pulsatile mass, normal bowl sounds, no rebound, no guarding  Musculoskeletal: supple neck, no lower extremity edema, no calf tenderness, FROM of b/l upper and lower extremities without pain.   Integumentary: warm, dry, no rash  Neurologic: awake, alert, cranial nerves II-XII grossly intact, extremities’ motor and sensory functions grossly intact. steady gait.   Psychiatric: appropriate mood, appropriate affect

## 2022-06-07 RX ORDER — METOPROLOL SUCCINATE 50 MG/1
TABLET, EXTENDED RELEASE ORAL
Qty: 90 TABLET | Refills: 0 | OUTPATIENT
Start: 2022-06-07

## 2022-07-24 DIAGNOSIS — I10 ESSENTIAL (PRIMARY) HYPERTENSION: ICD-10-CM

## 2022-07-25 RX ORDER — LOSARTAN POTASSIUM 100 MG/1
TABLET ORAL
Qty: 90 TABLET | Refills: 1 | OUTPATIENT
Start: 2022-07-25

## 2022-09-16 ENCOUNTER — TELEPHONE (OUTPATIENT)
Dept: FAMILY MEDICINE CLINIC | Age: 77
End: 2022-09-16

## 2022-09-16 ENCOUNTER — OFFICE VISIT (OUTPATIENT)
Dept: FAMILY MEDICINE CLINIC | Age: 77
End: 2022-09-16

## 2022-09-16 VITALS
SYSTOLIC BLOOD PRESSURE: 129 MMHG | DIASTOLIC BLOOD PRESSURE: 76 MMHG | BODY MASS INDEX: 21.31 KG/M2 | HEART RATE: 84 BPM | WEIGHT: 140.6 LBS | OXYGEN SATURATION: 99 % | HEIGHT: 68 IN

## 2022-09-16 DIAGNOSIS — G54.2 CERVICAL ROOT DISORDERS, NOT ELSEWHERE CLASSIFIED: ICD-10-CM

## 2022-09-16 DIAGNOSIS — M06.041 RHEUMATOID ARTHRITIS WITHOUT RHEUMATOID FACTOR, RIGHT HAND: ICD-10-CM

## 2022-09-16 DIAGNOSIS — S16.1XXA ACUTE STRAIN OF NECK MUSCLE, INITIAL ENCOUNTER: Primary | ICD-10-CM

## 2022-09-16 PROCEDURE — 99213 OFFICE O/P EST LOW 20 MIN: CPT | Performed by: NURSE PRACTITIONER

## 2022-09-16 RX ORDER — BISACODYL 5 MG
TABLET, DELAYED RELEASE (ENTERIC COATED) ORAL EVERY 24 HOURS
COMMUNITY
End: 2022-10-04 | Stop reason: ALTCHOICE

## 2022-09-16 RX ORDER — METOPROLOL SUCCINATE 50 MG/1
25 TABLET, EXTENDED RELEASE ORAL DAILY
COMMUNITY
Start: 2022-06-07 | End: 2022-10-04 | Stop reason: DRUGHIGH

## 2022-09-16 RX ORDER — GUAIFENESIN 600 MG/1
TABLET, EXTENDED RELEASE ORAL EVERY 12 HOURS
COMMUNITY
End: 2022-10-04

## 2022-09-16 RX ORDER — METHOCARBAMOL 500 MG/1
TABLET, FILM COATED ORAL EVERY 8 HOURS
COMMUNITY
End: 2022-10-04 | Stop reason: SDUPTHER
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
## 2022-09-20 DIAGNOSIS — E78.5 HYPERLIPIDEMIA, UNSPECIFIED HYPERLIPIDEMIA TYPE: ICD-10-CM

## 2022-09-20 RX ORDER — EZETIMIBE 10 MG/1
TABLET ORAL
Qty: 30 TABLET | Refills: 5 | Status: SHIPPED | OUTPATIENT
Start: 2022-09-20 | End: 2023-03-06

## 2022-10-04 ENCOUNTER — OFFICE VISIT (OUTPATIENT)
Dept: FAMILY MEDICINE CLINIC | Age: 77
End: 2022-10-04

## 2022-10-04 VITALS
BODY MASS INDEX: 21.43 KG/M2 | HEART RATE: 69 BPM | HEIGHT: 68 IN | DIASTOLIC BLOOD PRESSURE: 72 MMHG | OXYGEN SATURATION: 98 % | WEIGHT: 141.4 LBS | SYSTOLIC BLOOD PRESSURE: 130 MMHG | TEMPERATURE: 98.7 F
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
## 2022-10-04 DIAGNOSIS — S16.1XXD ACUTE STRAIN OF NECK MUSCLE, SUBSEQUENT ENCOUNTER: ICD-10-CM

## 2022-10-04 DIAGNOSIS — R42 DIZZINESS: ICD-10-CM

## 2022-10-04 DIAGNOSIS — G44.309 POST-TRAUMATIC HEADACHE, NOT INTRACTABLE, UNSPECIFIED CHRONICITY PATTERN: ICD-10-CM

## 2022-10-04 PROBLEM — K85.90 ACUTE PANCREATITIS: Status: ACTIVE | Noted: 2022-10-04
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
## 2022-10-04 PROBLEM — M54.2 NECK PAIN: Status: ACTIVE | Noted: 2022-10-04

## 2022-10-04 PROBLEM — Z85.810 HISTORY OF TONGUE CANCER: Status: ACTIVE | Noted: 2021-04-03

## 2022-10-04 PROBLEM — R10.9 RIGHT FLANK PAIN: Status: ACTIVE | Noted: 2022-10-04

## 2022-10-04 PROBLEM — M54.6 THORACIC BACK PAIN: Status: ACTIVE | Noted: 2022-10-04

## 2022-10-04 PROBLEM — F07.81 POST CONCUSSION SYNDROME: Status: ACTIVE | Noted: 2022-10-04

## 2022-10-04 PROBLEM — K06.8 BLEEDING GUMS: Status: ACTIVE | Noted: 2022-10-04

## 2022-10-04 PROBLEM — C85.89: Status: ACTIVE | Noted: 2022-04-28

## 2022-10-04 PROBLEM — J06.9 URI, ACUTE: Status: ACTIVE | Noted: 2022-10-04

## 2022-10-04 PROBLEM — I69.354 HEMIPLGA FOLLOWING CEREBRAL INFRC AFFECTING LEFT NONDOM SIDE: Status: ACTIVE | Noted: 2021-03-21

## 2022-10-04 PROBLEM — Z79.82 LONG TERM CURRENT USE OF ASPIRIN: Status: ACTIVE | Noted: 2021-04-03

## 2022-10-04 PROCEDURE — 99213 OFFICE O/P EST LOW 20 MIN: CPT | Performed by: NURSE PRACTITIONER

## 2022-10-04 RX ORDER — METOPROLOL SUCCINATE 50 MG/1
50 TABLET, EXTENDED RELEASE ORAL DAILY
COMMUNITY
End: 2023-03-27 | Stop reason: DRUGHIGH

## 2022-10-04 RX ORDER — METHOCARBAMOL 500 MG/1
250-500 TABLET, FILM COATED ORAL NIGHTLY PRN
Qty: 5 TABLET | Refills: 0 | Status: SHIPPED | OUTPATIENT
Start: 2022-10-04 | End: 2022-10-10

## 2022-10-04 RX ORDER — POLYETHYLENE GLYCOL 3350 17 G/17G
17 POWDER, FOR SOLUTION ORAL AS NEEDED
COMMUNITY
Start: 2022-09-21
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
## 2022-10-10 DIAGNOSIS — S16.1XXD ACUTE STRAIN OF NECK MUSCLE, SUBSEQUENT ENCOUNTER: ICD-10-CM

## 2022-10-10 RX ORDER — METHOCARBAMOL 500 MG/1
TABLET, FILM COATED ORAL
Qty: 20 TABLET | Refills: 0 | Status: SHIPPED | OUTPATIENT
Start: 2022-10-10 | End: 2022-11-02

## 2022-10-22 DIAGNOSIS — I10 ESSENTIAL (PRIMARY) HYPERTENSION: ICD-10-CM

## 2022-10-24 ENCOUNTER — TELEPHONE (OUTPATIENT)
Dept: NEUROLOGY | Facility: CLINIC | Age: 77
End: 2022-10-24

## 2022-10-24 RX ORDER — HYDROCHLOROTHIAZIDE 12.5 MG/1
CAPSULE, GELATIN COATED ORAL
Qty: 90 CAPSULE | Refills: 0 | Status: SHIPPED | OUTPATIENT
Start: 2022-10-24 | End: 2023-03-27 | Stop reason: ALTCHOICE

## 2022-10-24 NOTE — TELEPHONE ENCOUNTER
Attempted to call pt to see if he could go  a disc from [facility] that has his ct of head scans/images. No answer, unable to leave a voice mail. Attempted to call alternative number listed for pt, was told I had the wrong number.     Will send fax to [facility] asking them to get a disc together and mail it to us- clr

## 2022-10-25 ENCOUNTER — LAB (OUTPATIENT)
Dept: LAB | Facility: HOSPITAL | Age: 77
End: 2022-10-25

## 2022-10-25 ENCOUNTER — OFFICE VISIT (OUTPATIENT)
Dept: NEUROLOGY | Facility: CLINIC | Age: 77
End: 2022-10-25

## 2022-10-25 ENCOUNTER — PATIENT ROUNDING (BHMG ONLY) (OUTPATIENT)
Dept: NEUROLOGY | Facility: CLINIC | Age: 77
End: 2022-10-25

## 2022-10-25 VITALS
SYSTOLIC BLOOD PRESSURE: 143 MMHG | HEART RATE: 70 BPM | HEIGHT: 68 IN | OXYGEN SATURATION: 100 % | DIASTOLIC BLOOD PRESSURE: 70 MMHG | WEIGHT: 144.8 LBS | BODY MASS INDEX: 21.95 KG/M2

## 2022-10-25 DIAGNOSIS — F07.81 POST CONCUSSION SYNDROME: Primary | ICD-10-CM

## 2022-10-25 DIAGNOSIS — F01.A0 MILD VASCULAR DEMENTIA, UNSPECIFIED WHETHER BEHAVIORAL, PSYCHOTIC, OR MOOD DISTURBANCE OR ANXIETY: ICD-10-CM

## 2022-10-25 DIAGNOSIS — I63.231 CEREBRAL INFARCTION DUE TO UNSPECIFIED OCCLUSION OR STENOSIS OF RIGHT CAROTID ARTERIES: ICD-10-CM

## 2022-10-25 DIAGNOSIS — I69.30 SEQUELAE, POST-STROKE: ICD-10-CM

## 2022-10-25 LAB
ALBUMIN SERPL-MCNC: 4 G/DL (ref 3.5–5.2)
ALBUMIN/GLOB SERPL: 1.5 G/DL
ALP SERPL-CCNC: 95 U/L (ref 39–117)
ALT SERPL W P-5'-P-CCNC: 21 U/L (ref 1–41)
ANION GAP SERPL CALCULATED.3IONS-SCNC: 8.5 MMOL/L (ref 5–15)
AST SERPL-CCNC: 26 U/L (ref 1–40)
BASOPHILS # BLD AUTO: 0.03 10*3/MM3 (ref 0–0.2)
BASOPHILS NFR BLD AUTO: 0.5 % (ref 0–1.5)
BILIRUB SERPL-MCNC: 0.5 MG/DL (ref 0–1.2)
BUN SERPL-MCNC: 25 MG/DL (ref 8–23)
BUN/CREAT SERPL: 13 (ref 7–25)
CALCIUM SPEC-SCNC: 9.1 MG/DL (ref 8.6–10.5)
CHLORIDE SERPL-SCNC: 103 MMOL/L (ref 98–107)
CO2 SERPL-SCNC: 29.5 MMOL/L (ref 22–29)
CREAT SERPL-MCNC: 1.93 MG/DL (ref 0.76–1.27)
DEPRECATED RDW RBC AUTO: 51.1 FL (ref 37–54)
EGFRCR SERPLBLD CKD-EPI 2021: 35.4 ML/MIN/1.73
EOSINOPHIL # BLD AUTO: 0.25 10*3/MM3 (ref 0–0.4)
EOSINOPHIL NFR BLD AUTO: 3.9 % (ref 0.3–6.2)
ERYTHROCYTE [DISTWIDTH] IN BLOOD BY AUTOMATED COUNT: 15.4 % (ref 12.3–15.4)
FOLATE SERPL-MCNC: 8.06 NG/ML (ref 4.78–24.2)
GLOBULIN UR ELPH-MCNC: 2.6 GM/DL
GLUCOSE SERPL-MCNC: 110 MG/DL (ref 65–99)
HCT VFR BLD AUTO: 41.1 % (ref 37.5–51)
HGB BLD-MCNC: 13.1 G/DL (ref 13–17.7)
IMM GRANULOCYTES # BLD AUTO: 0.01 10*3/MM3 (ref 0–0.05)
IMM GRANULOCYTES NFR BLD AUTO: 0.2 % (ref 0–0.5)
LYMPHOCYTES # BLD AUTO: 1.14 10*3/MM3 (ref 0.7–3.1)
LYMPHOCYTES NFR BLD AUTO: 17.7 % (ref 19.6–45.3)
MCH RBC QN AUTO: 28.5 PG (ref 26.6–33)
MCHC RBC AUTO-ENTMCNC: 31.9 G/DL (ref 31.5–35.7)
MCV RBC AUTO: 89.3 FL (ref 79–97)
MONOCYTES # BLD AUTO: 0.5 10*3/MM3 (ref 0.1–0.9)
MONOCYTES NFR BLD AUTO: 7.8 % (ref 5–12)
NEUTROPHILS NFR BLD AUTO: 4.51 10*3/MM3 (ref 1.7–7)
NEUTROPHILS NFR BLD AUTO: 69.9 % (ref 42.7–76)
PLATELET # BLD AUTO: 250 10*3/MM3 (ref 140–450)
PMV BLD AUTO: 9.7 FL (ref 6–12)
POTASSIUM SERPL-SCNC: 4.2 MMOL/L (ref 3.5–5.2)
PROT SERPL-MCNC: 6.6 G/DL (ref 6–8.5)
RBC # BLD AUTO: 4.6 10*6/MM3 (ref 4.14–5.8)
SODIUM SERPL-SCNC: 141 MMOL/L (ref 136–145)
VIT B12 BLD-MCNC: 572 PG/ML (ref 211–946)
WBC NRBC COR # BLD: 6.44 10*3/MM3 (ref 3.4–10.8)

## 2022-10-25 PROCEDURE — 80053 COMPREHEN METABOLIC PANEL: CPT

## 2022-10-25 PROCEDURE — 83921 ORGANIC ACID SINGLE QUANT: CPT

## 2022-10-25 PROCEDURE — 36415 COLL VENOUS BLD VENIPUNCTURE: CPT

## 2022-10-25 PROCEDURE — 82746 ASSAY OF FOLIC ACID SERUM: CPT

## 2022-10-25 PROCEDURE — 82607 VITAMIN B-12: CPT

## 2022-10-25 PROCEDURE — 85025 COMPLETE CBC W/AUTO DIFF WBC: CPT

## 2022-10-25 PROCEDURE — 99205 OFFICE O/P NEW HI 60 MIN: CPT | Performed by: PSYCHIATRY & NEUROLOGY

## 2022-10-25 RX ORDER — DONEPEZIL HYDROCHLORIDE 5 MG/1
5 TABLET, FILM COATED ORAL NIGHTLY
Qty: 30 TABLET | Refills: 2 | Status: SHIPPED | OUTPATIENT
Start: 2022-10-25 | End: 2022-12-06 | Stop reason: SINTOL
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
## 2022-10-25 NOTE — PROGRESS NOTES
"October 25, 2022    Hello, may I speak with Dixon Amador?    My name is Osiris      [facility] am  [facility]  [address] [facility] [address]  [facility] [address] [phone].    Before we get started may I verify your date of birth? 1945    I am calling to officially welcome you to our practice and ask about your recent visit. Is this a good time to talk? yes    Tell me about your visit with us. What things went well?  patient stated he was pleased with his appt. Happy with the care he received. Said \"yup\" when I asked him if checking in and rooming went ok.       We're always looking for ways to make our patients' experiences even better. Do you have recommendations on ways we may improve?  no    Overall were you satisfied with your first visit to our practice? yes       I appreciate you taking the time to speak with me today. Is there anything else I can do for you? no      Thank you, and have a great day.      "

## 2022-10-28 LAB — METHYLMALONATE SERPL-SCNC: 377 NMOL/L (ref 0–378)

## 2022-10-30 DIAGNOSIS — I10 ESSENTIAL (PRIMARY) HYPERTENSION: ICD-10-CM

## 2022-10-31 RX ORDER — AMLODIPINE BESYLATE 10 MG/1
10 TABLET ORAL DAILY
Qty: 90 TABLET | Refills: 1 | Status: SHIPPED | OUTPATIENT
Start: 2022-10-31

## 2022-10-31 RX ORDER — LOSARTAN POTASSIUM 100 MG/1
100 TABLET ORAL DAILY
Qty: 90 TABLET | Refills: 1 | Status: SHIPPED | OUTPATIENT
Start: 2022-10-31

## 2022-11-02 DIAGNOSIS — S16.1XXD ACUTE STRAIN OF NECK MUSCLE, SUBSEQUENT ENCOUNTER: ICD-10-CM

## 2022-11-02 RX ORDER — METHOCARBAMOL 500 MG/1
TABLET, FILM COATED ORAL
Qty: 20 TABLET | Refills: 0 | Status: SHIPPED | OUTPATIENT
Start: 2022-11-02 | End: 2022-12-06 | Stop reason: SINTOL

## 2022-11-17 ENCOUNTER — HOSPITAL ENCOUNTER (OUTPATIENT)
Dept: MRI IMAGING | Facility: HOSPITAL | Age: 77
Discharge: HOME | End: 2022-11-17
Admitting: PSYCHIATRY & NEUROLOGY

## 2022-11-17 ENCOUNTER — HOSPITAL ENCOUNTER (OUTPATIENT)
Dept: CARDIOLOGY | Facility: HOSPITAL | Age: 77
Discharge: HOME | End: 2022-11-17
Admitting: PSYCHIATRY & NEUROLOGY

## 2022-11-17 DIAGNOSIS — F01.A0 MILD VASCULAR DEMENTIA, UNSPECIFIED WHETHER BEHAVIORAL, PSYCHOTIC, OR MOOD DISTURBANCE OR ANXIETY: ICD-10-CM

## 2022-11-17 DIAGNOSIS — I69.30 SEQUELAE, POST-STROKE: ICD-10-CM

## 2022-11-17 DIAGNOSIS — F07.81 POST CONCUSSION SYNDROME: ICD-10-CM

## 2022-11-17 DIAGNOSIS — I63.231 CEREBRAL INFARCTION DUE TO UNSPECIFIED OCCLUSION OR STENOSIS OF RIGHT CAROTID ARTERIES: ICD-10-CM

## 2022-11-17 LAB
BH CV XLRA MEAS LEFT CAROTID BULB EDV: 14 CM/SEC
BH CV XLRA MEAS LEFT CAROTID BULB PSV: 53 CM/SEC
BH CV XLRA MEAS LEFT DIST CCA EDV: 21 CM/SEC
BH CV XLRA MEAS LEFT DIST CCA PSV: 79 CM/SEC
BH CV XLRA MEAS LEFT DIST ICA EDV: 17 CM/SEC
BH CV XLRA MEAS LEFT DIST ICA PSV: 80 CM/SEC
BH CV XLRA MEAS LEFT MID ICA EDV: 19 CM/SEC
BH CV XLRA MEAS LEFT MID ICA PSV: 70 CM/SEC
BH CV XLRA MEAS LEFT PROX CCA EDV: 23 CM/SEC
BH CV XLRA MEAS LEFT PROX CCA PSV: 104 CM/SEC
BH CV XLRA MEAS LEFT PROX ECA EDV: 10 CM/SEC
BH CV XLRA MEAS LEFT PROX ECA PSV: 55 CM/SEC
BH CV XLRA MEAS LEFT PROX ICA EDV: 22 CM/SEC
BH CV XLRA MEAS LEFT PROX ICA PSV: 78 CM/SEC
BH CV XLRA MEAS LEFT VERTEBRAL A EDV: 3 CM/SEC
BH CV XLRA MEAS LEFT VERTEBRAL A PSV: 45 CM/SEC
BH CV XLRA MEAS RIGHT CAROTID BULB EDV: 17 CM/SEC
BH CV XLRA MEAS RIGHT CAROTID BULB PSV: 49 CM/SEC
BH CV XLRA MEAS RIGHT DIST CCA EDV: 13 CM/SEC
BH CV XLRA MEAS RIGHT DIST CCA PSV: 39 CM/SEC
BH CV XLRA MEAS RIGHT DIST ICA EDV: 27 CM/SEC
BH CV XLRA MEAS RIGHT DIST ICA PSV: 97 CM/SEC
BH CV XLRA MEAS RIGHT MID ICA EDV: 71 CM/SEC
BH CV XLRA MEAS RIGHT MID ICA PSV: 277 CM/SEC
BH CV XLRA MEAS RIGHT PROX CCA EDV: 16 CM/SEC
BH CV XLRA MEAS RIGHT PROX CCA PSV: 57 CM/SEC
BH CV XLRA MEAS RIGHT PROX ECA EDV: 14 CM/SEC
BH CV XLRA MEAS RIGHT PROX ECA PSV: 48 CM/SEC
BH CV XLRA MEAS RIGHT PROX ICA EDV: 74 CM/SEC
BH CV XLRA MEAS RIGHT PROX ICA PSV: 279 CM/SEC
BH CV XLRA MEAS RIGHT VERTEBRAL A EDV: 7 CM/SEC
BH CV XLRA MEAS RIGHT VERTEBRAL A PSV: 39 CM/SEC
CREAT BLDA-MCNC: 1.3 MG/DL
EGFRCR SERPLBLD CKD-EPI 2021: 56.6 ML/MIN/1.73
LEFT ARM BP: NORMAL MMHG
MAXIMAL PREDICTED HEART RATE: 143 BPM
RIGHT ARM BP: NORMAL MMHG
STRESS TARGET HR: 122 BPM

## 2022-11-17 PROCEDURE — 93880 EXTRACRANIAL BILAT STUDY: CPT

## 2022-11-17 PROCEDURE — 0 GADOBENATE DIMEGLUMINE 529 MG/ML SOLUTION: Performed by: PSYCHIATRY & NEUROLOGY

## 2022-11-17 PROCEDURE — 93880 EXTRACRANIAL BILAT STUDY: CPT | Performed by: SURGERY

## 2022-11-17 PROCEDURE — 70553 MRI BRAIN STEM W/O & W/DYE: CPT

## 2022-11-17 PROCEDURE — A9577 INJ MULTIHANCE: HCPCS | Performed by: PSYCHIATRY & NEUROLOGY

## 2022-11-17 PROCEDURE — 82565 ASSAY OF CREATININE: CPT

## 2022-11-17 RX ADMIN — GADOBENATE DIMEGLUMINE 13 ML: 529 INJECTION, SOLUTION INTRAVENOUS at 11:29

## 2022-11-18 ENCOUNTER — TELEPHONE (OUTPATIENT)
Dept: NEUROLOGY | Facility: CLINIC | Age: 77
End: 2022-11-18

## 2022-11-18 ENCOUNTER — HOSPITAL ENCOUNTER (OUTPATIENT)
Dept: MRI IMAGING | Facility: HOSPITAL | Age: 77
Discharge: HOME | End: 2022-11-18
Admitting: PSYCHIATRY & NEUROLOGY

## 2022-11-18 DIAGNOSIS — I65.23 BILATERAL CAROTID ARTERY STENOSIS: ICD-10-CM

## 2022-11-18 DIAGNOSIS — I65.23 BILATERAL CAROTID ARTERY STENOSIS: Primary | ICD-10-CM

## 2022-11-18 PROCEDURE — 70547 MR ANGIOGRAPHY NECK W/O DYE: CPT

## 2022-11-18 NOTE — TELEPHONE ENCOUNTER
Finally got a hold of pt. Explained the importance of having this scan done. He was hesitant at first, but decided he would go on to [place] to have the scan done tonight.- clr

## 2022-11-18 NOTE — TELEPHONE ENCOUNTER
LMTRC on wife's voicemail.     Pt was scheduled for MRA today 11/18/2022 at the \[facility]\"" in [facility] at 6 o'clock.     Attempted to call pt himself but no answer, unable to leave him a voice mail.    Its highly, HIGHLY recommended pt go to this scan per Dr. Chavez instructions and with him ordering this scan STAT.    If for some reason he absolutely cannot go to this appt, he can call [phone] option 3 to reschedule, but he needs to reschedule and have scan done ASAP.      -clr

## 2022-12-06 ENCOUNTER — OFFICE VISIT (OUTPATIENT)
Dept: NEUROLOGY | Facility: CLINIC | Age: 77
End: 2022-12-06

## 2022-12-06 VITALS
WEIGHT: 149 LBS | HEART RATE: 62 BPM | DIASTOLIC BLOOD PRESSURE: 74 MMHG | HEIGHT: 68 IN | BODY MASS INDEX: 22.58 KG/M2 | SYSTOLIC BLOOD PRESSURE: 159 MMHG | OXYGEN SATURATION: 96 %

## 2022-12-06 DIAGNOSIS — E03.8 OTHER SPECIFIED HYPOTHYROIDISM: ICD-10-CM

## 2022-12-06 DIAGNOSIS — F03.90 DEMENTIA WITHOUT BEHAVIORAL DISTURBANCE: Primary | ICD-10-CM

## 2022-12-06 PROCEDURE — 99214 OFFICE O/P EST MOD 30 MIN: CPT | Performed by: PSYCHIATRY & NEUROLOGY

## 2022-12-06 RX ORDER — MEMANTINE HYDROCHLORIDE 5 MG/1
5 TABLET ORAL DAILY
Qty: 30 TABLET | Refills: 2 | Status: SHIPPED | OUTPATIENT
Start: 2022-12-06 | End: 2023-03-06

## 2022-12-06 RX ORDER — AMLODIPINE BESYLATE 10 MG/1
1 TABLET ORAL DAILY
COMMUNITY
Start: 2022-10-31 | End: 2022-12-06 | Stop reason: SDUPTHER

## 2022-12-06 RX ORDER — LEVOTHYROXINE SODIUM 137 UG/1
137 TABLET ORAL DAILY
Qty: 90 TABLET | Refills: 0 | Status: SHIPPED | OUTPATIENT
Start: 2022-12-06 | End: 2023-03-20

## 2022-12-06 NOTE — PROGRESS NOTES
This is a very pleasant 77-year-old gentleman who had seen in initial consultation for memory decline likely secondary to a combination of vascular dementia and recent head trauma..  I thought he likely had a vascular dementia he had a history of previous ischemic infarcts as well as lymphoma.  I had started him on a small dose of donepezil but he had trouble tolerating this medication and stopped the medication several weeks ago.  He reports no new neurologic issues since I saw him last.  He reports no major change in his mentation.        On examination today he is alert and oriented x3.  He had trouble with delayed recall he was able to get 2 out of 3 delayed recall items.  He had trouble simple mathematic calculations and working memory.        I reviewed his most recent MRI of the brain which shows scattered subcortical white matter disease consistent with ischemic gliotic change as well as what appears to be a chronic infarct in the right frontal lobe.  There was no acute infarct he did have an area of restricted diffusion in the left thalamus but there was no ADC correlate this consistent with T2 shine through.  I did not see any obvious evidence of lymphoma in the brain.  His mesial temporal structures look good although he does have some generalized atrophy.  There is no contrast-enhancement.  His carotid duplex showed a proximal right internal carotid artery severe stenosis approximately 80 to 99% there was bilateral carotid bulb plaque his MRA of the head showed 81% stenosis of the proximal right ICA 47% stenosis of the left ICA mild arthrosclerotic changes in the distal internal carotid arteries bilaterally        In summary this a very pleasant 77-year-old gentleman with cognitive decline which is likely the combination of a vascular dementia along with recent head trauma.  He was not able to tolerate donepezil.  I am going to start him on a small dose of Namenda.  I reviewed the risks and benefits of  this medication with him at length and he would like to proceed try this medication.        For his previous stroke he will continue Xarelto he is also taking this for cardiac reasons.  I am going to refer him to vascular neurosurgery given his severe carotid stenosis.  He will continue good blood pressure control and cholesterol control he is currently on Zetia he cannot take statins.        He will follow-up with me in 6 months or sooner if there is any problems in the interim.        I spent 30 minutes in patient care.

## 2022-12-24 DIAGNOSIS — I63.50 CEREBRAL INFARCTION DUE TO UNSPECIFIED OCCLUSION OR STENOSIS OF UNSPECIFIED CEREBRAL ARTERY: ICD-10-CM

## 2022-12-27 RX ORDER — RIVAROXABAN 20 MG/1
20 TABLET, FILM COATED ORAL
Qty: 90 TABLET | Refills: 1 | Status: SHIPPED | OUTPATIENT
Start: 2022-12-27 | End: 2023-03-17 | Stop reason: ALTCHOICE

## 2023-01-09 ENCOUNTER — TELEPHONE (OUTPATIENT)
Dept: FAMILY MEDICINE CLINIC | Age: 78
End: 2023-01-09
Payer: MEDICARE

## 2023-01-09 NOTE — TELEPHONE ENCOUNTER
Caller: [facility]    Relationship to patient: Other    Best call back number: [phone]    Patient is needing: WANTING TO CONFIRM FAX WAS RECEIVED AND WHEN THEY SHOULD EXPECT THE INFORMATION TO BE SENT BACK.

## 2023-01-10 NOTE — TELEPHONE ENCOUNTER
KAYE with Ann at [facility]surgery [facility] and let her know we have not received a medical record request and we will need one before any records can be processed and sent from [facility].

## 2023-03-05 DIAGNOSIS — E78.5 HYPERLIPIDEMIA, UNSPECIFIED HYPERLIPIDEMIA TYPE: ICD-10-CM

## 2023-03-06 RX ORDER — MEMANTINE HYDROCHLORIDE 5 MG/1
5 TABLET ORAL DAILY
Qty: 30 TABLET | Refills: 3 | Status: SHIPPED | OUTPATIENT
Start: 2023-03-06 | End: 2024-03-05

## 2023-03-06 RX ORDER — EZETIMIBE 10 MG/1
TABLET ORAL
Qty: 90 TABLET | Refills: 1 | Status: SHIPPED | OUTPATIENT
Start: 2023-03-06

## 2023-03-11 DIAGNOSIS — E03.8 OTHER SPECIFIED HYPOTHYROIDISM: ICD-10-CM

## 2023-03-17 ENCOUNTER — LAB (OUTPATIENT)
Dept: LAB | Facility: HOSPITAL | Age: 78
End: 2023-03-17
Payer: COMMERCIAL

## 2023-03-17 ENCOUNTER — OFFICE VISIT (OUTPATIENT)
Dept: FAMILY MEDICINE CLINIC | Age: 78
End: 2023-03-17
Payer: COMMERCIAL

## 2023-03-17 VITALS
TEMPERATURE: 98.5 F | WEIGHT: 150.8 LBS | HEART RATE: 66 BPM | OXYGEN SATURATION: 98 % | DIASTOLIC BLOOD PRESSURE: 70 MMHG | SYSTOLIC BLOOD PRESSURE: 116 MMHG | BODY MASS INDEX: 22.85 KG/M2 | HEIGHT: 68 IN

## 2023-03-17 DIAGNOSIS — I50.9 OTHER CONGESTIVE HEART FAILURE: ICD-10-CM

## 2023-03-17 DIAGNOSIS — N28.9 ABNORMAL KIDNEY FUNCTION: ICD-10-CM

## 2023-03-17 DIAGNOSIS — F51.01 PRIMARY INSOMNIA: ICD-10-CM

## 2023-03-17 DIAGNOSIS — I48.91 ATRIAL FIBRILLATION, UNSPECIFIED TYPE: ICD-10-CM

## 2023-03-17 DIAGNOSIS — J96.01 ACUTE RESPIRATORY FAILURE WITH HYPOXIA: ICD-10-CM

## 2023-03-17 DIAGNOSIS — I21.4 NSTEMI (NON-ST ELEVATED MYOCARDIAL INFARCTION): ICD-10-CM

## 2023-03-17 DIAGNOSIS — D64.9 ANEMIA, UNSPECIFIED TYPE: ICD-10-CM

## 2023-03-17 DIAGNOSIS — D64.9 ANEMIA, UNSPECIFIED TYPE: Primary | ICD-10-CM

## 2023-03-17 LAB
ALBUMIN SERPL-MCNC: 4.1 G/DL (ref 3.5–5.2)
ALBUMIN/GLOB SERPL: 1.3 G/DL
ALP SERPL-CCNC: 92 U/L (ref 39–117)
ALT SERPL W P-5'-P-CCNC: 16 U/L (ref 1–41)
ANION GAP SERPL CALCULATED.3IONS-SCNC: 11 MMOL/L (ref 5–15)
AST SERPL-CCNC: 23 U/L (ref 1–40)
BILIRUB SERPL-MCNC: 0.3 MG/DL (ref 0–1.2)
BILIRUB UR QL STRIP: NEGATIVE
BUN SERPL-MCNC: 35 MG/DL (ref 8–23)
BUN/CREAT SERPL: 20.2 (ref 7–25)
CALCIUM SPEC-SCNC: 9.1 MG/DL (ref 8.6–10.5)
CHLORIDE SERPL-SCNC: 98 MMOL/L (ref 98–107)
CLARITY UR: CLEAR
CO2 SERPL-SCNC: 27 MMOL/L (ref 22–29)
COLOR UR: YELLOW
CREAT SERPL-MCNC: 1.73 MG/DL (ref 0.76–1.27)
DEPRECATED RDW RBC AUTO: 47.8 FL (ref 37–54)
EGFRCR SERPLBLD CKD-EPI 2021: 40.2 ML/MIN/1.73
ERYTHROCYTE [DISTWIDTH] IN BLOOD BY AUTOMATED COUNT: 16.6 % (ref 12.3–15.4)
GLOBULIN UR ELPH-MCNC: 3.1 GM/DL
GLUCOSE SERPL-MCNC: 89 MG/DL (ref 65–99)
GLUCOSE UR STRIP-MCNC: NEGATIVE MG/DL
HCT VFR BLD AUTO: 34.5 % (ref 37.5–51)
HGB BLD-MCNC: 10.8 G/DL (ref 13–17.7)
HGB UR QL STRIP.AUTO: NEGATIVE
KETONES UR QL STRIP: NEGATIVE
LEUKOCYTE ESTERASE UR QL STRIP.AUTO: NEGATIVE
MCH RBC QN AUTO: 24.9 PG (ref 26.6–33)
MCHC RBC AUTO-ENTMCNC: 31.3 G/DL (ref 31.5–35.7)
MCV RBC AUTO: 79.7 FL (ref 79–97)
NITRITE UR QL STRIP: NEGATIVE
PH UR STRIP.AUTO: 7 [PH] (ref 5–8)
PLATELET # BLD AUTO: 233 10*3/MM3 (ref 140–450)
PMV BLD AUTO: 9.2 FL (ref 6–12)
POTASSIUM SERPL-SCNC: 4.7 MMOL/L (ref 3.5–5.2)
PROT SERPL-MCNC: 7.2 G/DL (ref 6–8.5)
PROT UR QL STRIP: ABNORMAL
RBC # BLD AUTO: 4.33 10*6/MM3 (ref 4.14–5.8)
SODIUM SERPL-SCNC: 136 MMOL/L (ref 136–145)
SP GR UR STRIP: 1.01 (ref 1–1.03)
UROBILINOGEN UR QL STRIP: ABNORMAL
WBC NRBC COR # BLD: 6.83 10*3/MM3 (ref 3.4–10.8)

## 2023-03-17 PROCEDURE — 99214 OFFICE O/P EST MOD 30 MIN: CPT | Performed by: NURSE PRACTITIONER

## 2023-03-17 PROCEDURE — 81003 URINALYSIS AUTO W/O SCOPE: CPT

## 2023-03-17 PROCEDURE — 85027 COMPLETE CBC AUTOMATED: CPT

## 2023-03-17 PROCEDURE — 80053 COMPREHEN METABOLIC PANEL: CPT

## 2023-03-17 PROCEDURE — 36415 COLL VENOUS BLD VENIPUNCTURE: CPT

## 2023-03-17 RX ORDER — ASPIRIN 325 MG
325 TABLET ORAL DAILY
Qty: 30 TABLET | Refills: 11 | COMMUNITY
Start: 2023-01-18 | End: 2023-03-17 | Stop reason: ALTCHOICE

## 2023-03-17 RX ORDER — DONEPEZIL HYDROCHLORIDE 5 MG/1
5 TABLET, FILM COATED ORAL NIGHTLY
COMMUNITY
Start: 2022-10-25 | End: 2023-03-30 | Stop reason: SDDI

## 2023-03-17 RX ORDER — SPIRONOLACTONE 25 MG/1
12.5 TABLET ORAL DAILY
COMMUNITY
Start: 2023-03-09 | End: 2023-03-30 | Stop reason: ALTCHOICE

## 2023-03-17 RX ORDER — CLOPIDOGREL BISULFATE 75 MG/1
75 TABLET ORAL DAILY
Qty: 30 TABLET | Refills: 11 | COMMUNITY
Start: 2023-01-18 | End: 2023-03-30 | Stop reason: ALTCHOICE

## 2023-03-17 RX ORDER — HYDROXYZINE 50 MG/1
50 TABLET, FILM COATED ORAL NIGHTLY PRN
Qty: 60 TABLET | Refills: 1 | Status: SHIPPED | OUTPATIENT
Start: 2023-03-17

## 2023-03-17 RX ORDER — FUROSEMIDE 40 MG/1
20 TABLET ORAL DAILY
COMMUNITY
Start: 2023-03-09

## 2023-03-20 RX ORDER — LEVOTHYROXINE SODIUM 137 UG/1
TABLET ORAL
Qty: 90 TABLET | Refills: 0 | Status: SHIPPED | OUTPATIENT
Start: 2023-03-20

## 2023-03-27 ENCOUNTER — LAB (OUTPATIENT)
Dept: LAB | Facility: HOSPITAL | Age: 78
End: 2023-03-27
Payer: COMMERCIAL

## 2023-03-27 DIAGNOSIS — N28.9 ABNORMAL KIDNEY FUNCTION: ICD-10-CM

## 2023-03-27 PROBLEM — I65.21 CAROTID STENOSIS, RIGHT: Status: ACTIVE | Noted: 2023-02-02

## 2023-03-27 PROBLEM — L40.9 PSORIASIS: Status: ACTIVE | Noted: 2023-01-04

## 2023-03-27 PROBLEM — I48.91 ATRIAL FIBRILLATION: Status: ACTIVE | Noted: 2023-03-27

## 2023-03-27 PROBLEM — R76.8 RHEUMATOID FACTOR POSITIVE: Status: ACTIVE | Noted: 2023-01-04

## 2023-03-27 PROBLEM — Z79.01 LONG TERM (CURRENT) USE OF ANTICOAGULANTS: Status: ACTIVE | Noted: 2023-03-09

## 2023-03-27 PROBLEM — I50.9 CONGESTIVE HEART FAILURE: Status: ACTIVE | Noted: 2023-03-27

## 2023-03-27 PROBLEM — I21.4 NSTEMI (NON-ST ELEVATED MYOCARDIAL INFARCTION): Status: ACTIVE | Noted: 2023-02-13

## 2023-03-27 PROBLEM — F11.90 CHRONIC, CONTINUOUS USE OF OPIOIDS: Status: ACTIVE | Noted: 2022-11-10

## 2023-03-27 PROBLEM — D64.9 ANEMIA: Status: ACTIVE | Noted: 2023-03-27

## 2023-03-27 LAB
ALBUMIN SERPL-MCNC: 4.2 G/DL (ref 3.5–5.2)
ALBUMIN UR-MCNC: 32 MG/DL
ANION GAP SERPL CALCULATED.3IONS-SCNC: 10.8 MMOL/L (ref 5–15)
BUN SERPL-MCNC: 41 MG/DL (ref 8–23)
BUN/CREAT SERPL: 27.5 (ref 7–25)
CALCIUM SPEC-SCNC: 9.5 MG/DL (ref 8.6–10.5)
CHLORIDE SERPL-SCNC: 96 MMOL/L (ref 98–107)
CO2 SERPL-SCNC: 27.2 MMOL/L (ref 22–29)
CREAT SERPL-MCNC: 1.49 MG/DL (ref 0.76–1.27)
CREAT UR-MCNC: 60.9 MG/DL
EGFRCR SERPLBLD CKD-EPI 2021: 48 ML/MIN/1.73
GLUCOSE SERPL-MCNC: 106 MG/DL (ref 65–99)
MICROALBUMIN/CREAT UR: 525.5 MG/G
PHOSPHATE SERPL-MCNC: 3.4 MG/DL (ref 2.5–4.5)
POTASSIUM SERPL-SCNC: 5 MMOL/L (ref 3.5–5.2)
SODIUM SERPL-SCNC: 134 MMOL/L (ref 136–145)

## 2023-03-27 PROCEDURE — 80069 RENAL FUNCTION PANEL: CPT

## 2023-03-27 PROCEDURE — 36415 COLL VENOUS BLD VENIPUNCTURE: CPT

## 2023-03-27 PROCEDURE — 82043 UR ALBUMIN QUANTITATIVE: CPT

## 2023-03-27 PROCEDURE — 82570 ASSAY OF URINE CREATININE: CPT

## 2023-03-27 NOTE — PROGRESS NOTES
Chief Complaint  Dixon Amador presents to [facility] for Coronary Artery Disease (In hospital for heart attack)      Subjective     History of Present Illness  Dixon IS here today with  wife to follow up after a recent hospitalization.  Dixon was admitted to [facility] on 2/13/2023 and was discharged on 2/16/2023 with a DIAGNOSIS  of  NSTEMI.    His TREATMENT included LHC with PCI to OM /RCA during, medication adjustments ,  the hospital stay.     I reviewed :   -discharge summary yes   -hospital problems yes   -inpatient lab results (yes  -inpatient diagnostic studies no  NONE AVAILABLE FOR REVIEW  -consultation reports yes     During his hospital stay, he  was treated by Hospitalists  and consults during hospitalization included Cardiology- Dr. Pulliam.      Pertinent diagnostic findings during hospital stay included:    Labs:     SEE LINKED LABS REVIEWED    Imaging:    NONE AVAILABLE    Additional discharge recommendations include:follow up with cardiology .     Follow up appts already scheduled  include:  Future Appointments  6/13/2023  10:00 AM   Jani Briseno DO     [address]  9/18/2023  9:15 AM    Shayy Galaviz APRN      [facility]        [facility] .    Appointments that need to be made include:cardiology appt scheduled 3/9/2023 ANTONIO Fritz with cardiology     Dixon reports that his condition is improved since discharge.    DIXON IS UNSURE OF HIS MEDICATIONS HE IS CURRENTLY TAKING- HE DID NOT BRING WITH HIM TODAY ALL MEDICATIONS.  Dixon reports he brought the medication list that he was given however he is unsure exactly what he is taking.        Assessment and Plan       Diagnoses and all orders for this visit:    1. Anemia, unspecified type (Primary)  Comments:  Repeat CBC in 2 weeks  Orders:  -     CBC No Differential; Future    2. Abnormal kidney function  Comments:  will need to forward results of kidney function to L[facility] lepe  Cardiology; repeat CMP/renal function panel in 2 weeks  Orders:  -     Comprehensive metabolic panel; Future  -     Urinalysis With Culture If Indicated -; Future  -     Renal Function Panel; Future  -     Microalbumin / Creatinine Urine Ratio - Urine, Clean Catch; Future    3. Primary insomnia  Comments:  I will add on hydroxyzine as needed advised to not use trazodone concurrently may resume if hydroxyzine ineffective  Orders:  -     hydrOXYzine (ATARAX) 50 MG tablet; Take 1 tablet by mouth At Night As Needed (insomnia). Caution, may cause drowsiness  Dispense: 60 tablet; Refill: 1    4. NSTEMI (non-ST elevated myocardial infarction) (HCC)  Comments:  Continue medication as recommended follow-up with cardiology as scheduled.  We will call to clarify medications he is currently taking    5. Other congestive heart failure (HCC)  Comments:  Continue to follow-up with cardiology as recommended.  Continue metoprolol , losartan and diuretic as prescribed    6. Acute respiratory failure with hypoxia (HCC)  Comments:  Stable for now follow-up as needed    7. Atrial fibrillation, unspecified type (HCC)  Comments:  Continue Eliquis as prescribed and follow-up with cardiology as recommended        Follow Up   Return in about 4 weeks (around 4/14/2023) for Recheck.      New Medications Ordered This Visit   Medications   • hydrOXYzine (ATARAX) 50 MG tablet     Sig: Take 1 tablet by mouth At Night As Needed (insomnia). Caution, may cause drowsiness     Dispense:  60 tablet     Refill:  1       Medications Discontinued During This Encounter   Medication Reason   • Xarelto 20 MG tablet Alternate therapy   • aspirin 325 MG tablet Discontinued by another clinician   • ticagrelor (BRILINTA) 90 MG tablet tablet Discontinued by another clinician   • hydroCHLOROthiazide (MICROZIDE) 12.5 MG capsule Discontinued by another clinician   • metoprolol succinate XL (TOPROL-XL) 50 MG 24 hr tablet Dose adjustment            Review of  "Systems    Objective     Vitals:    03/17/23 1133   BP: 116/70   BP Location: Left arm   Patient Position: Sitting   Cuff Size: Adult   Pulse: 66   Temp: 98.5 °F (36.9 °C)   TempSrc: Oral   SpO2: 98%   Weight: 68.4 kg (150 lb 12.8 oz)   Height: 172.7 cm (67.99\")     Body mass index is 22.94 kg/m².     Physical Exam  Vitals reviewed.   Constitutional:       Appearance: Normal appearance.   HENT:      Head: Normocephalic.   Eyes:      Pupils: Pupils are equal, round, and reactive to light.   Cardiovascular:      Rate and Rhythm: Normal rate and regular rhythm.      Heart sounds: No murmur heard.  Pulmonary:      Effort: Pulmonary effort is normal.      Breath sounds: Normal breath sounds.   Musculoskeletal:         General: Normal range of motion.   Neurological:      Mental Status: He is alert.   Psychiatric:         Mood and Affect: Mood normal.         Behavior: Behavior normal.            Result Review   The following data was reviewed by: DAWSON Andrade on 03/17/2023:  COMPREHENSIVE METABOLIC PANEL (02/13/2023 02:40)  LIPID PANEL (02/13/2023 02:40)  HEMOGLOBIN A1C (02/13/2023 02:40)  D-DIMER, QUANTITATIVE (02/13/2023 02:40)    PROCALCITONIN (02/13/2023 02:40)  TSH (02/13/2023 02:40)  TROPONIN (02/13/2023 02:40)  PROBNP (REFERENCE) (02/13/2023 02:40)  APTT (02/13/2023 02:40)  CBC AND DIFFERENTIAL (02/13/2023 02:40)  TROPONIN (02/13/2023 05:58)  POCT ACTIVATED CLOTTING TIME (02/13/2023 09:54)  POCT GLUCOSE FINGERSTICK (02/13/2023 13:43)  CBC AND DIFFERENTIAL (02/14/2023 04:42)  POCT GLUCOSE FINGERSTICK (02/14/2023 08:26)  APTT (02/14/2023 16:04)  BASIC METABOLIC PANEL (02/15/2023 06:04)  CBC AND DIFFERENTIAL (02/15/2023 06:04)  POCT GLUCOSE FINGERSTICK (02/15/2023 07:25)  POCT ACTIVATED CLOTTING TIME (02/15/2023 09:00)  POCT GLUCOSE FINGERSTICK (02/15/2023 21:25)  CBC AND DIFFERENTIAL (02/16/2023 05:16)  BASIC METABOLIC PANEL (02/16/2023 05:16)                  Allergies   Allergen Reactions   • Atorvastatin " Unknown - Low Severity   • Codeine Unknown - Low Severity     causes delium  causes delium        Past Medical History:   Diagnosis Date   • Cerebral infarction due to unspecified occlusion or stenosis of unspecified cerebral artery (HCC)    • Cervical root disorders, not elsewhere classified    • Cervicalgia    • Constipation, unspecified    • Cough    • Dysphagia, unspecified    • Essential (primary) hypertension    • Hypokalemia    • 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    • Hypothyroidism, unspecified     AFTER RADIATION   • Malignant neoplasm of pharynx, unspecified (HCC)    • Other long term (current) drug therapy    • Other specified anxiety disorders    • Other specified disorders of bone, multiple sites    • Pneumonia    • Polycythemia vera (HCC)    • Primary insomnia    • Radiculopathy, lumbar region    • 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, left hand (HCC)    • 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, right hand (HCC)    • Shortness of breath    • Syncope and collapse    • Tonsil cancer (HCC)     CHEMO AND RADIATION; OVER 11 YRS AGO; NOW CLEARD     Current Outpatient Medications   Medication Sig Dispense Refill   • amLODIPine (NORVASC) 10 MG tablet TAKE 1 TABLET BY MOUTH DAILY 90 tablet 1   • apixaban (ELIQUIS) 5 MG tablet tablet Take 1 tablet by mouth 2 (Two) Times a Day.     • clopidogrel (PLAVIX) 75 MG tablet Take 1 tablet by mouth Daily. 30 tablet 11   • Diclofenac Sodium (VOLTAREN) 1 % gel gel APPLY 2 GRAMS TOPICALLY FOUR TIMES DAILY AS NEEDED FOR PAIN     • donepezil (ARICEPT) 5 MG tablet Take 1 tablet by mouth Every Night.     • ezetimibe (ZETIA) 10 MG tablet TAKE 1 TABLET BY MOUTH DAILY 90 tablet 1   • furosemide (LASIX) 40 MG tablet Take 20 mg by mouth Daily.     • HYDROcodone-acetaminophen (NORCO) 5-325 MG per tablet Take 1 tablet by mouth As Needed.     • hydrOXYzine (ATARAX) 50 MG tablet Take 1 tablet by mouth At Night As Needed (insomnia).  Caution, may cause drowsiness 60 tablet 1   • levothyroxine (SYNTHROID, LEVOTHROID) 137 MCG tablet TAKE 1 TABLET BY MOUTH DAILY ON AN EMPTY STOMACH 90 tablet 0   • losartan (COZAAR) 100 MG tablet TAKE 1 TABLET BY MOUTH DAILY 90 tablet 1   • Melatonin 10 MG tablet Take  by mouth Every Night.     • memantine (NAMENDA) 5 MG tablet TAKE 1 TABLET BY MOUTH DAILY 30 tablet 3   • metoprolol tartrate (LOPRESSOR) 25 MG tablet Take 1 tablet by mouth 2 (Two) Times a Day.     • polyethylene glycol (MIRALAX) 17 GM/SCOOP powder Take 17 g by mouth As Needed.     • spironolactone (ALDACTONE) 25 MG tablet Take 12.5 mg by mouth Daily.     • traZODone (DESYREL) 50 MG tablet Take 1-2 tablets by mouth At Night As Needed for Sleep. 180 tablet 1     No current facility-administered medications for this visit.     Past Surgical History:   Procedure Laterality Date   • BACK SURGERY  1987    x2   • OTHER SURGICAL HISTORY  2008    Throat cancer resection      Health Maintenance Due   Topic Date Due   • ZOSTER VACCINE (1 of 2) Never done   • TDAP/TD VACCINES (2 - Tdap) 05/21/2013      Immunization History   Administered Date(s) Administered   • COVID-19 (MODERNA) 1st, 2nd, 3rd Dose Only 02/24/2021, 04/02/2021, 11/24/2021   • COVID-19 (MODERNA) BIVALENT BOOSTER 12+YRS 09/09/2022   • Fluad Quad 65+ 09/09/2022   • Fluzone High Dose =>65 Years ([employer] ONLY) 09/04/2020   • Fluzone High-Dose 65+yrs 09/04/2020, 09/27/2021   • Influenza TIV (IM) 11/01/2017   • Influenza, Unspecified 09/22/2020   • Pneumococcal Conjugate 13-Valent (PCV13) 01/11/2021   • Pneumococcal Conjugate 20-Valent (PCV20) 09/09/2022   • Pneumococcal Polysaccharide (PPSV23) 11/13/2009, 03/28/2019   • Td 05/21/2003         Part of this note may be an electronic transcription/translation of spoken language to printed   text using the Dragon Dictation System.      DAWSON Andrade

## 2023-03-29 ENCOUNTER — TELEPHONE (OUTPATIENT)
Dept: FAMILY MEDICINE CLINIC | Age: 78
End: 2023-03-29
Payer: MEDICARE

## 2023-03-29 NOTE — TELEPHONE ENCOUNTER
----- Message from DAWSON Andrade sent at 3/27/2023 10:02 PM EDT -----  I need you to call him at home and see what medications he is actually taking on a daily basis so we can reconcile his list.He didn't know when he was here   according to the [facility] DC in February:    Eliquis 5mg BID  Brilinta  90 BID  Lasix 40 daily  Levothyroxine 137mcg daily  Dulcolax PRN  Aricept 5 qhs  Zetia 10 daily  Losartan 100mg daily   Melatonin PRN  Robaxin prn  Trazodone  qhs  Metoprolol  50mg BID  Namenda  5mg BID      HE SHOULD NOT BE TAKING:  Amlodipine  ASA  HCTZ  Lisinopril  Xarelto

## 2023-03-30 ENCOUNTER — TELEPHONE (OUTPATIENT)
Dept: FAMILY MEDICINE CLINIC | Age: 78
End: 2023-03-30
Payer: MEDICARE

## 2023-03-30 DIAGNOSIS — N28.9 ABNORMAL KIDNEY FUNCTION: Primary | ICD-10-CM

## 2023-03-30 DIAGNOSIS — I21.4 NSTEMI (NON-ST ELEVATED MYOCARDIAL INFARCTION): ICD-10-CM

## 2023-03-30 DIAGNOSIS — I10 ESSENTIAL (PRIMARY) HYPERTENSION: ICD-10-CM

## 2023-03-30 DIAGNOSIS — I48.91 ATRIAL FIBRILLATION, UNSPECIFIED TYPE: Primary | ICD-10-CM

## 2023-03-30 RX ORDER — METOPROLOL TARTRATE 50 MG/1
25 TABLET, FILM COATED ORAL 2 TIMES DAILY
Start: 2023-03-30

## 2023-03-31 ENCOUNTER — TELEPHONE (OUTPATIENT)
Dept: FAMILY MEDICINE CLINIC | Age: 78
End: 2023-03-31
Payer: MEDICARE

## 2023-03-31 NOTE — TELEPHONE ENCOUNTER
----- Message from Nita Mendoza LPN sent at 3/31/2023 11:49 AM EDT -----    ----- Message -----  From: Shayy Galaviz APRN  Sent: 3/27/2023  10:08 PM EDT  To: [facility]    I need you to call him at home and see what medications he is actually taking on a daily basis so we can reconcile his list.He didn't know when he was here   according to the Hospital DC in February:    Eliquis 5mg BID  Brilinta  90 BID  Lasix 40 daily  Levothyroxine 137mcg daily  Dulcolax PRN  Aricept 5 qhs  Zetia 10 daily  Losartan 100mg daily   Melatonin PRN  Robaxin prn  Trazodone  qhs  Metoprolol  50mg BID  Namenda  5mg BID      HE SHOULD NOT BE TAKING:  Amlodipine  ASA  HCTZ  Lisinopril  Xarelto

## 2023-03-31 NOTE — TELEPHONE ENCOUNTER
Patient is taking all below listed meds that he was discharge with with 2 other ones he had with him    Spironolactone 25 mg 1/2 tab daily    Hydroxyzine 50 mg 1 tab HS

## 2023-04-18 ENCOUNTER — TELEPHONE (OUTPATIENT)
Dept: FAMILY MEDICINE CLINIC | Age: 78
End: 2023-04-18
Payer: MEDICARE

## 2023-05-01 ENCOUNTER — LAB (OUTPATIENT)
Dept: LAB | Facility: HOSPITAL | Age: 78
End: 2023-05-01
Payer: COMMERCIAL

## 2023-05-01 DIAGNOSIS — N28.9 ABNORMAL KIDNEY FUNCTION: ICD-10-CM

## 2023-05-01 LAB
ALBUMIN SERPL-MCNC: 4 G/DL (ref 3.5–5.2)
ALBUMIN/GLOB SERPL: 1.4 G/DL
ALP SERPL-CCNC: 101 U/L (ref 39–117)
ALT SERPL W P-5'-P-CCNC: 17 U/L (ref 1–41)
ANION GAP SERPL CALCULATED.3IONS-SCNC: 8 MMOL/L (ref 5–15)
AST SERPL-CCNC: 25 U/L (ref 1–40)
BILIRUB SERPL-MCNC: 0.4 MG/DL (ref 0–1.2)
BUN SERPL-MCNC: 21 MG/DL (ref 8–23)
BUN/CREAT SERPL: 14.8 (ref 7–25)
CALCIUM SPEC-SCNC: 9 MG/DL (ref 8.6–10.5)
CHLORIDE SERPL-SCNC: 105 MMOL/L (ref 98–107)
CO2 SERPL-SCNC: 26 MMOL/L (ref 22–29)
CREAT SERPL-MCNC: 1.42 MG/DL (ref 0.76–1.27)
EGFRCR SERPLBLD CKD-EPI 2021: 50.9 ML/MIN/1.73
GLOBULIN UR ELPH-MCNC: 2.8 GM/DL
GLUCOSE SERPL-MCNC: 111 MG/DL (ref 65–99)
POTASSIUM SERPL-SCNC: 4.3 MMOL/L (ref 3.5–5.2)
PROT SERPL-MCNC: 6.8 G/DL (ref 6–8.5)
SODIUM SERPL-SCNC: 139 MMOL/L (ref 136–145)

## 2023-05-01 PROCEDURE — 80053 COMPREHEN METABOLIC PANEL: CPT

## 2023-05-01 PROCEDURE — 36415 COLL VENOUS BLD VENIPUNCTURE: CPT

## 2023-05-03 ENCOUNTER — OFFICE VISIT (OUTPATIENT)
Dept: FAMILY MEDICINE CLINIC | Age: 78
End: 2023-05-03
Payer: COMMERCIAL

## 2023-05-03 ENCOUNTER — TELEPHONE (OUTPATIENT)
Dept: FAMILY MEDICINE CLINIC | Age: 78
End: 2023-05-03

## 2023-05-03 ENCOUNTER — LAB (OUTPATIENT)
Dept: LAB | Facility: HOSPITAL | Age: 78
End: 2023-05-03
Payer: COMMERCIAL

## 2023-05-03 VITALS
DIASTOLIC BLOOD PRESSURE: 78 MMHG | HEIGHT: 68 IN | HEART RATE: 73 BPM | SYSTOLIC BLOOD PRESSURE: 158 MMHG | BODY MASS INDEX: 23.04 KG/M2 | OXYGEN SATURATION: 95 % | WEIGHT: 152 LBS

## 2023-05-03 DIAGNOSIS — I25.2 HISTORY OF NON-ST ELEVATION MYOCARDIAL INFARCTION (NSTEMI): ICD-10-CM

## 2023-05-03 DIAGNOSIS — Z85.818 HISTORY OF CANCER TONSIL: ICD-10-CM

## 2023-05-03 DIAGNOSIS — E61.1 IRON DEFICIENCY: ICD-10-CM

## 2023-05-03 DIAGNOSIS — R19.5 DARK STOOLS: ICD-10-CM

## 2023-05-03 DIAGNOSIS — Z85.01 HISTORY OF ESOPHAGEAL CANCER: ICD-10-CM

## 2023-05-03 DIAGNOSIS — C85.89 MARGINAL ZONE LYMPHOMA OF EXTRANODAL AND SOLID ORGAN SITES: ICD-10-CM

## 2023-05-03 DIAGNOSIS — R76.8 RHEUMATOID FACTOR POSITIVE: ICD-10-CM

## 2023-05-03 DIAGNOSIS — I50.9 CHRONIC CONGESTIVE HEART FAILURE, UNSPECIFIED HEART FAILURE TYPE: ICD-10-CM

## 2023-05-03 DIAGNOSIS — I63.50 CEREBRAL INFARCTION DUE TO UNSPECIFIED OCCLUSION OR STENOSIS OF UNSPECIFIED CEREBRAL ARTERY: ICD-10-CM

## 2023-05-03 DIAGNOSIS — Z79.01 LONG TERM (CURRENT) USE OF ANTICOAGULANTS: ICD-10-CM

## 2023-05-03 DIAGNOSIS — I10 ESSENTIAL (PRIMARY) HYPERTENSION: ICD-10-CM

## 2023-05-03 DIAGNOSIS — R19.5 DARK STOOLS: Primary | ICD-10-CM

## 2023-05-03 DIAGNOSIS — I48.91 ATRIAL FIBRILLATION, UNSPECIFIED TYPE: ICD-10-CM

## 2023-05-03 DIAGNOSIS — Z95.828 INTERNAL CAROTID ARTERY STENT PRESENT: ICD-10-CM

## 2023-05-03 DIAGNOSIS — Z85.810 HISTORY OF TONGUE CANCER: ICD-10-CM

## 2023-05-03 DIAGNOSIS — M06.041 RHEUMATOID ARTHRITIS WITHOUT RHEUMATOID FACTOR, RIGHT HAND: ICD-10-CM

## 2023-05-03 PROBLEM — K85.90 ACUTE PANCREATITIS: Status: RESOLVED | Noted: 2022-10-04 | Resolved: 2023-05-03

## 2023-05-03 PROBLEM — R10.9 RIGHT FLANK PAIN: Status: RESOLVED | Noted: 2022-10-04 | Resolved: 2023-05-03

## 2023-05-03 PROBLEM — E83.110 HEREDITARY HEMOCHROMATOSIS: Status: ACTIVE | Noted: 2023-05-03

## 2023-05-03 PROBLEM — Z79.82 LONG TERM CURRENT USE OF ASPIRIN: Status: RESOLVED | Noted: 2021-04-03 | Resolved: 2023-05-03

## 2023-05-03 PROBLEM — H35.30 MACULAR DEGENERATION, BILATERAL: Status: ACTIVE | Noted: 2023-05-03

## 2023-05-03 PROBLEM — D63.8 ANEMIA, CHRONIC DISEASE: Status: ACTIVE | Noted: 2023-03-27

## 2023-05-03 PROBLEM — G47.39 OTHER SLEEP APNEA: Status: ACTIVE | Noted: 2020-01-01

## 2023-05-03 PROBLEM — J06.9 URI, ACUTE: Status: RESOLVED | Noted: 2022-10-04 | Resolved: 2023-05-03

## 2023-05-03 PROBLEM — Z87.11 HISTORY OF PEPTIC ULCER DISEASE: Status: ACTIVE | Noted: 2020-01-01
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
## 2023-05-03 PROBLEM — Z85.828 HISTORY OF BASAL CELL CANCER: Status: ACTIVE | Noted: 2020-01-01

## 2023-05-03 LAB
BASOPHILS # BLD AUTO: 0.03 10*3/MM3 (ref 0–0.2)
BASOPHILS NFR BLD AUTO: 0.3 % (ref 0–1.5)
DEPRECATED RDW RBC AUTO: 53.6 FL (ref 37–54)
EOSINOPHIL # BLD AUTO: 0.17 10*3/MM3 (ref 0–0.4)
EOSINOPHIL NFR BLD AUTO: 1.5 % (ref 0.3–6.2)
ERYTHROCYTE [DISTWIDTH] IN BLOOD BY AUTOMATED COUNT: 19.1 % (ref 12.3–15.4)
FERRITIN SERPL-MCNC: 61 NG/ML (ref 30–400)
HCT VFR BLD AUTO: 32.9 % (ref 37.5–51)
HGB BLD-MCNC: 10.4 G/DL (ref 13–17.7)
IMM GRANULOCYTES # BLD AUTO: 0.02 10*3/MM3 (ref 0–0.05)
IMM GRANULOCYTES NFR BLD AUTO: 0.2 % (ref 0–0.5)
IRON 24H UR-MRATE: 17 MCG/DL (ref 59–158)
IRON SATN MFR SERPL: 4 % (ref 20–50)
LYMPHOCYTES # BLD AUTO: 1.09 10*3/MM3 (ref 0.7–3.1)
LYMPHOCYTES NFR BLD AUTO: 9.4 % (ref 19.6–45.3)
MCH RBC QN AUTO: 24.6 PG (ref 26.6–33)
MCHC RBC AUTO-ENTMCNC: 31.6 G/DL (ref 31.5–35.7)
MCV RBC AUTO: 78 FL (ref 79–97)
MONOCYTES # BLD AUTO: 0.82 10*3/MM3 (ref 0.1–0.9)
MONOCYTES NFR BLD AUTO: 7.1 % (ref 5–12)
NEUTROPHILS NFR BLD AUTO: 81.5 % (ref 42.7–76)
NEUTROPHILS NFR BLD AUTO: 9.49 10*3/MM3 (ref 1.7–7)
PLATELET # BLD AUTO: 320 10*3/MM3 (ref 140–450)
PMV BLD AUTO: 9.2 FL (ref 6–12)
RBC # BLD AUTO: 4.22 10*6/MM3 (ref 4.14–5.8)
TIBC SERPL-MCNC: 447 MCG/DL (ref 298–536)
TRANSFERRIN SERPL-MCNC: 300 MG/DL (ref 200–360)
WBC NRBC COR # BLD: 11.62 10*3/MM3 (ref 3.4–10.8)

## 2023-05-03 PROCEDURE — 84466 ASSAY OF TRANSFERRIN: CPT

## 2023-05-03 PROCEDURE — 85025 COMPLETE CBC W/AUTO DIFF WBC: CPT

## 2023-05-03 PROCEDURE — 36415 COLL VENOUS BLD VENIPUNCTURE: CPT

## 2023-05-03 PROCEDURE — 83540 ASSAY OF IRON: CPT

## 2023-05-03 PROCEDURE — 82728 ASSAY OF FERRITIN: CPT

## 2023-05-03 RX ORDER — DONEPEZIL HYDROCHLORIDE 5 MG/1
5 TABLET, FILM COATED ORAL DAILY
Qty: 30 TABLET | Refills: 11 | COMMUNITY
Start: 2022-10-25 | End: 2023-05-05

## 2023-05-03 RX ORDER — TRAZODONE HYDROCHLORIDE 50 MG/1
50 TABLET ORAL NIGHTLY
COMMUNITY

## 2023-05-03 RX ORDER — FERROUS SULFATE 325(65) MG
325 TABLET ORAL
Qty: 30 TABLET | Refills: 1 | Status: SHIPPED | OUTPATIENT
Start: 2023-05-03

## 2023-05-03 RX ORDER — PANTOPRAZOLE SODIUM 40 MG/1
40 TABLET, DELAYED RELEASE ORAL DAILY
Qty: 30 TABLET | Refills: 1 | Status: SHIPPED | OUTPATIENT
Start: 2023-05-03

## 2023-05-03 RX ORDER — HYDROCODONE BITARTRATE AND ACETAMINOPHEN 7.5; 325 MG/1; MG/1
TABLET ORAL
COMMUNITY
Start: 2023-04-24

## 2023-05-03 NOTE — TELEPHONE ENCOUNTER
Pt spouse came into the office to drop off [facility] paperwork. The $20 fee was collected. She would like the paperwork faxed to her employer and a copy for herself. The paperwork was put into Bobbi's mailbox and attached to this encounter.
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
## 2023-05-04 ENCOUNTER — LAB (OUTPATIENT)
Dept: LAB | Facility: HOSPITAL | Age: 78
End: 2023-05-04
Payer: MEDICARE

## 2023-05-04 ENCOUNTER — REFERRAL TRIAGE (OUTPATIENT)
Dept: CASE MANAGEMENT | Facility: OTHER | Age: 78
End: 2023-05-04
Payer: MEDICARE

## 2023-05-04 DIAGNOSIS — D63.8 ANEMIA, CHRONIC DISEASE: ICD-10-CM

## 2023-05-04 DIAGNOSIS — D63.8 ANEMIA, CHRONIC DISEASE: Primary | ICD-10-CM

## 2023-05-04 LAB
BASOPHILS # BLD AUTO: 0.04 10*3/MM3 (ref 0–0.2)
BASOPHILS NFR BLD AUTO: 0.7 % (ref 0–1.5)
DEPRECATED RDW RBC AUTO: 53.9 FL (ref 37–54)
EOSINOPHIL # BLD AUTO: 0.36 10*3/MM3 (ref 0–0.4)
EOSINOPHIL NFR BLD AUTO: 6.2 % (ref 0.3–6.2)
ERYTHROCYTE [DISTWIDTH] IN BLOOD BY AUTOMATED COUNT: 19.2 % (ref 12.3–15.4)
HCT VFR BLD AUTO: 31.2 % (ref 37.5–51)
HGB BLD-MCNC: 9.7 G/DL (ref 13–17.7)
IMM GRANULOCYTES # BLD AUTO: 0.01 10*3/MM3 (ref 0–0.05)
IMM GRANULOCYTES NFR BLD AUTO: 0.2 % (ref 0–0.5)
LYMPHOCYTES # BLD AUTO: 1.19 10*3/MM3 (ref 0.7–3.1)
LYMPHOCYTES NFR BLD AUTO: 20.4 % (ref 19.6–45.3)
MCH RBC QN AUTO: 24.3 PG (ref 26.6–33)
MCHC RBC AUTO-ENTMCNC: 31.1 G/DL (ref 31.5–35.7)
MCV RBC AUTO: 78.2 FL (ref 79–97)
MONOCYTES # BLD AUTO: 0.48 10*3/MM3 (ref 0.1–0.9)
MONOCYTES NFR BLD AUTO: 8.2 % (ref 5–12)
NEUTROPHILS NFR BLD AUTO: 3.75 10*3/MM3 (ref 1.7–7)
NEUTROPHILS NFR BLD AUTO: 64.3 % (ref 42.7–76)
PLATELET # BLD AUTO: 303 10*3/MM3 (ref 140–450)
PMV BLD AUTO: 9.7 FL (ref 6–12)
RBC # BLD AUTO: 3.99 10*6/MM3 (ref 4.14–5.8)
WBC NRBC COR # BLD: 5.83 10*3/MM3 (ref 3.4–10.8)

## 2023-05-04 PROCEDURE — 85025 COMPLETE CBC W/AUTO DIFF WBC: CPT

## 2023-05-05 ENCOUNTER — PATIENT OUTREACH (OUTPATIENT)
Dept: CASE MANAGEMENT | Facility: OTHER | Age: 78
End: 2023-05-05
Payer: MEDICARE

## 2023-05-05 DIAGNOSIS — I48.91 ATRIAL FIBRILLATION, UNSPECIFIED TYPE: ICD-10-CM

## 2023-05-05 DIAGNOSIS — I21.4 NSTEMI (NON-ST ELEVATED MYOCARDIAL INFARCTION): ICD-10-CM

## 2023-05-05 DIAGNOSIS — R19.5 DARK STOOLS: Primary | ICD-10-CM

## 2023-05-05 DIAGNOSIS — I10 ESSENTIAL (PRIMARY) HYPERTENSION: ICD-10-CM

## 2023-05-05 DIAGNOSIS — D63.8 ANEMIA, CHRONIC DISEASE: ICD-10-CM

## 2023-05-05 RX ORDER — METOPROLOL SUCCINATE 50 MG/1
50 TABLET, EXTENDED RELEASE ORAL DAILY
COMMUNITY

## 2023-05-05 RX ORDER — METOPROLOL TARTRATE 50 MG/1
25 TABLET, FILM COATED ORAL 2 TIMES DAILY
Start: 2023-05-05

## 2023-05-05 NOTE — OUTREACH NOTE
AMBULATORY CASE MANAGEMENT NOTE    Name and Relationship of Patient/Support Person: Dixon Amador H - Self    Spoke with Long and discussed CCM program and verbal agreement to sign up for program.      Current needs are his anemia and determining which specialists he needs to follow up with.    He has been scheduled to see general surgery for anemia.  Labs faxed to his hematologist in [facility] for intervention.  He is performing a stool test kit now.   Currently in pain management.   Current with neurology.   Called ENT office, follow up only if needed.   Called patient and went over medications and updated list.  He is not taking Furosemide, encouraged to please  at pharmacy, states he will.      Wants to transfer care from cardiology to [place] for convenience.    Will create outreach for follow up.       Shari BERNABE  Ambulatory Case Management    5/5/2023, 14:26 EDT

## 2023-05-15 ENCOUNTER — TELEPHONE (OUTPATIENT)
Dept: FAMILY MEDICINE CLINIC | Age: 78
End: 2023-05-15
Payer: MEDICARE

## 2023-05-15 LAB
EXPIRATION DATE 2: NORMAL
EXPIRATION DATE 3: NORMAL
EXPIRATION DATE: NORMAL
GASTROCULT GAST QL: NEGATIVE
HEMOCCULT SP2 STL QL: NEGATIVE
HEMOCCULT SP3 STL QL: NEGATIVE
Lab: NORMAL

## 2023-05-15 RX ORDER — POLYMYXIN B SULFATE AND TRIMETHOPRIM 1; 10000 MG/ML; [USP'U]/ML
SOLUTION OPHTHALMIC
COMMUNITY
Start: 2023-05-15

## 2023-05-16 ENCOUNTER — TELEPHONE (OUTPATIENT)
Dept: SURGERY | Facility: CLINIC | Age: 78
End: 2023-05-16

## 2023-05-16 ENCOUNTER — OFFICE VISIT (OUTPATIENT)
Dept: SURGERY | Facility: CLINIC | Age: 78
End: 2023-05-16
Payer: MEDICARE

## 2023-05-16 VITALS — RESPIRATION RATE: 16 BRPM | BODY MASS INDEX: 21.98 KG/M2 | HEIGHT: 68 IN | WEIGHT: 145 LBS

## 2023-05-16 DIAGNOSIS — R10.13 EPIGASTRIC ABDOMINAL PAIN: Primary | ICD-10-CM
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
## 2023-05-17 DIAGNOSIS — I10 ESSENTIAL (PRIMARY) HYPERTENSION: ICD-10-CM

## 2023-05-18 RX ORDER — LOSARTAN POTASSIUM 100 MG/1
100 TABLET ORAL DAILY
Qty: 90 TABLET | Refills: 1 | Status: SHIPPED | OUTPATIENT
Start: 2023-05-18

## 2023-05-23 ENCOUNTER — PATIENT OUTREACH (OUTPATIENT)
Dept: CASE MANAGEMENT | Facility: OTHER | Age: 78
End: 2023-05-23
Payer: MEDICARE

## 2023-05-23 DIAGNOSIS — I10 ESSENTIAL (PRIMARY) HYPERTENSION: Primary | ICD-10-CM

## 2023-05-25 ENCOUNTER — LAB (OUTPATIENT)
Dept: LAB | Facility: HOSPITAL | Age: 78
End: 2023-05-25
Payer: MEDICARE

## 2023-05-25 DIAGNOSIS — D63.8 ANEMIA, CHRONIC DISEASE: ICD-10-CM

## 2023-05-25 LAB
BASOPHILS # BLD AUTO: 0.05 10*3/MM3 (ref 0–0.2)
BASOPHILS NFR BLD AUTO: 1 % (ref 0–1.5)
DEPRECATED RDW RBC AUTO: 65.2 FL (ref 37–54)
EOSINOPHIL # BLD AUTO: 0.36 10*3/MM3 (ref 0–0.4)
EOSINOPHIL NFR BLD AUTO: 7.1 % (ref 0.3–6.2)
ERYTHROCYTE [DISTWIDTH] IN BLOOD BY AUTOMATED COUNT: 22.2 % (ref 12.3–15.4)
HCT VFR BLD AUTO: 36.6 % (ref 37.5–51)
HGB BLD-MCNC: 11.3 G/DL (ref 13–17.7)
IMM GRANULOCYTES # BLD AUTO: 0 10*3/MM3 (ref 0–0.05)
IMM GRANULOCYTES NFR BLD AUTO: 0 % (ref 0–0.5)
LYMPHOCYTES # BLD AUTO: 0.95 10*3/MM3 (ref 0.7–3.1)
LYMPHOCYTES NFR BLD AUTO: 18.7 % (ref 19.6–45.3)
MCH RBC QN AUTO: 25.2 PG (ref 26.6–33)
MCHC RBC AUTO-ENTMCNC: 30.9 G/DL (ref 31.5–35.7)
MCV RBC AUTO: 81.7 FL (ref 79–97)
MONOCYTES # BLD AUTO: 0.3 10*3/MM3 (ref 0.1–0.9)
MONOCYTES NFR BLD AUTO: 5.9 % (ref 5–12)
NEUTROPHILS NFR BLD AUTO: 3.42 10*3/MM3 (ref 1.7–7)
NEUTROPHILS NFR BLD AUTO: 67.3 % (ref 42.7–76)
PLATELET # BLD AUTO: 276 10*3/MM3 (ref 140–450)
PMV BLD AUTO: 9 FL (ref 6–12)
RBC # BLD AUTO: 4.48 10*6/MM3 (ref 4.14–5.8)
WBC NRBC COR # BLD: 5.08 10*3/MM3 (ref 3.4–10.8)

## 2023-05-25 PROCEDURE — 85025 COMPLETE CBC W/AUTO DIFF WBC: CPT

## 2023-05-25 PROCEDURE — 36415 COLL VENOUS BLD VENIPUNCTURE: CPT

## 2023-05-25 NOTE — OUTREACH NOTE
AMBULATORY CASE MANAGEMENT NOTE    Name and Relationship of Patient/Support Person:  -     Following up with Long and his referral to Gen Surg.  Reached out to [facility] office to see if they had any success reaching cardiology office.  He will need a new referral to local cardiology for follow up, he has not been seen since 2019.  Reached out to PCP to place referral and will contact office when completed.  Lab completed today and improved.      Education Documentation  No documentation found.        Shari BERNABE  Ambulatory Case Management    5/25/2023, 14:51 EDT

## 2023-05-28 DIAGNOSIS — R19.5 DARK STOOLS: ICD-10-CM

## 2023-05-28 DIAGNOSIS — Z79.01 LONG TERM (CURRENT) USE OF ANTICOAGULANTS: ICD-10-CM

## 2023-05-30 DIAGNOSIS — I25.2 HISTORY OF NON-ST ELEVATION MYOCARDIAL INFARCTION (NSTEMI): ICD-10-CM

## 2023-05-30 DIAGNOSIS — Z79.899 OTHER LONG TERM (CURRENT) DRUG THERAPY: ICD-10-CM

## 2023-05-30 DIAGNOSIS — I50.9 CHRONIC CONGESTIVE HEART FAILURE, UNSPECIFIED HEART FAILURE TYPE: ICD-10-CM

## 2023-05-30 DIAGNOSIS — I48.91 ATRIAL FIBRILLATION, UNSPECIFIED TYPE: Primary | ICD-10-CM

## 2023-05-30 DIAGNOSIS — I10 ESSENTIAL (PRIMARY) HYPERTENSION: ICD-10-CM

## 2023-05-30 RX ORDER — PANTOPRAZOLE SODIUM 40 MG/1
40 TABLET, DELAYED RELEASE ORAL DAILY
Qty: 90 TABLET | OUTPATIENT
Start: 2023-05-30

## 2023-05-31 ENCOUNTER — PATIENT OUTREACH (OUTPATIENT)
Dept: CASE MANAGEMENT | Facility: OTHER | Age: 78
End: 2023-05-31

## 2023-05-31 DIAGNOSIS — D63.8 ANEMIA, CHRONIC DISEASE: Primary | ICD-10-CM

## 2023-05-31 DIAGNOSIS — M06.042 RHEUMATOID ARTHRITIS WITHOUT RHEUMATOID FACTOR, LEFT HAND: ICD-10-CM

## 2023-05-31 DIAGNOSIS — I10 ESSENTIAL (PRIMARY) HYPERTENSION: ICD-10-CM

## 2023-05-31 NOTE — OUTREACH NOTE
[facility] [place] End of Month Documentation    This Chronic Medical Management Care Plan for Dixon Amador, 77 y.o. male, has been a new plan of care implemented and a new plan of care implemented for the month of May.  A cumulative time of 70  minutes was spent on this patient record this month, including phone call with relative; electronic communication with primary care provider; electronic communication with other providers; chart review.    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; Essential (primary) hypertension; Dysphagia, unspecified; Constipation, unspecified; Cervical root disorders, not elsewhere classified; Cerebral infarction due to unspecified occlusion or stenosis of unspecified cerebral artery; Arthritis; Esophageal reflux; Ulcerative lesion; Other long term (current) drug therapy; Bleeding gums; 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; History of tongue cancer; 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; Neck pain; Post concussion syndrome; Thoracic back pain; Carotid stenosis, right; Chronic, continuous use of opioids; History of non-ST elevation myocardial infarction (NSTEMI); Psoriasis; Rheumatoid factor positive; Long term (current) use of anticoagulants; Congestive heart failure; Atrial fibrillation; Anemia, chronic disease; Hereditary hemochromatosis; Hepatomegaly; Secondary polycythemia (history of); History of basal cell cancer; History of esophageal cancer; History of peptic ulcer disease; Other sleep apnea; Macular degeneration, bilateral; Internal carotid artery stent present (Right); and Epigastric abdominal pain on their problem list.,  the following items were addressed: referrals to community service providers; transitions to medical care and any changes can be found within the plan section of the note.  A detailed listing of time spent for chronic care management is tracked within each outreach encounter.  Current medications include:  has a current medication list which includes the following prescription(s): apixaban, diclofenac sodium, ezetimibe, ferrous sulfate, furosemide, hydrocodone-acetaminophen, hydroxyzine, levothyroxine, losartan, melatonin, memantine, metoprolol succinate xl, metoprolol tartrate, pantoprazole, polyethylene glycol, ticagrelor, trazodone, and trimethoprim-polymyxin b. and the patient is reported to be No data recorded,  Medications are reported to be effective.  Regarding these diagnoses, referrals were made to the following provider(s):  specialists.  All notes on chart for PCP to review.    The patient was monitored remotely for blood pressure; mood & behavior; medications.    The patient's physical needs include:  medication education; physician referral.     The patient's mental support needs include:  continued support    The patient's cognitive support needs include:  continued support    The patient's psychosocial support needs include:  no opportunity for leisure activities    The patient's functional needs include: medication education; resources for disability needs    The patient's environmental needs include:  no involvement in outside activities or no access to other activities    Care Plan overall comments:  New referral.  Attempting to consolidate providers to a local area.  Will work with family to attempt.    Refer to previous outreach notes for more information on the areas listed above.    Monthly Billing Diagnoses  (D63.8) Anemia, chronic disease    (M06.042) 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, left hand    (I10) Essential (primary) hypertension    Medications   · Medications have been  reconciled    Care Plan progress this month:      Recently Modified Care Plans Updates made since 4/30/2023 12:00 AM     Iron Deficiency (Adult)         Problem Priority Last Modified     Iron Deficiency --  5/5/2023  2:25 PM by Shari Chua RN              Goal Recent Progress Last Modified     Anemia Prevented or Managed --  5/5/2023  2:25 PM by Shari Chua RN     Evidence-based guidance:   Monitor efficacy of treatment plan by review of signs and symptoms, and lab studies.   Anticipate further diagnostic testing that may include screening for celiac disease, gastroscopy, colonoscopy, video capsule endoscopy, small bowel MRI (magnetic resonance imaging) or CT (computerized tomography).    Promote adherence to vitamin and/or mineral supplementation such as taking iron supplement daily 1 or 2 hours before or after meals with Vitamin C-rich juice/food and without milk.   Provide individualized medical nutrition therapy.    Promote use of iron-fortified foods, such as ready-to-eat or cooked cereals and use of iron cooking pots.   Identify ways to alleviate barriers to treatment, including food insecurity, lack of potable water, poor sanitation, side effects of vitamin and mineral supplements, lack of education, and food preferences.   Monitor for constipation; encourage increased fluid and fiber intake, and use of stool softeners or laxatives.   If patient has inadequate response to oral therapy or co-morbidities (e.g., cardiac instability; dialysis; celiac or irritable bowel disease), anticipate/prepare patient for intravenous iron.   Encourage disclosure of pica-type behaviors; if present, acknowledge and validate physiological reasons for cravings and correct nutritional deficiencies.   Promote use of behavioral strategies that may include sensory reinforcement and training to discriminate between edible/inedible items to treat pica.   Anticipate/prepare patient for blood transfusion if symptoms are present  or if hemoglobin is significantly diminished.    Notes:            Task Due Date Last Modified     Alleviate Barriers to Maintaining Adequate Iron --  5/5/2023  2:25 PM by Shari Chua, RN     Care Management Activities:      - adherence to iron supplementation monitored  - barriers to treatment adherence identified  - cognitive behavior therapy provided  - laboratory results reviewed      Notes:                      · Current Specialty Plan of Care Status signed by both patient and provider    Instructions   · Patient was provided an electronic copy of care plan  · CCM services were explained and offered and patient has accepted these services.  · Patient has given their written consent to receive CCM services and understands that this includes the authorization of electronic communication of medical information with the other treating providers.  · Patient understands that they may stop CCM services at any time and these changes will be effective at the end of the calendar month and will effectively revocate the agreement of CCM services.  · Patient understands that only one practitioner can furnish and be paid for CCM services during one calendar month.  Patient also understands that there may be co-payment or deductible fees in association with CCM services.  · Patient will continue with at least monthly follow-up calls with the Ambulatory .    Shari BERNABE  Ambulatory Case Management    5/31/2023, 16:19 EDT

## 2023-06-01 ENCOUNTER — TELEPHONE (OUTPATIENT)
Dept: SURGERY | Facility: CLINIC | Age: 78
End: 2023-06-01

## 2023-06-01 NOTE — TELEPHONE ENCOUNTER
LORA CALLED FROM [facility] CARDIOLOGY.  SHE RECEIVED A REQUEST FOR CARDIAC CLEARANCE FROM OUR OFFICE, BUT SHE SAID THE PATIENT IS NOT SEEN THERE.

## 2023-06-02 ENCOUNTER — TELEPHONE (OUTPATIENT)
Dept: CARDIOLOGY | Facility: CLINIC | Age: 78
End: 2023-06-02

## 2023-06-02 NOTE — TELEPHONE ENCOUNTER
Caller: EDWARD    Relationship to patient: Provider  Best call back number: [phone]    Chief complaint: LOCATION     Type of visit: NEW PATIENT    Requested date: ASAP    If rescheduling, when is the original appointment: 6.27.23    Additional notes:NURSE NAVIGATOR CALLING IN TO [facility] FROM [place] TO [place] OFFICE DUE TO TRANSPORTATION AND DISTANCE. THE REFERRAL WAS FOR [place], YET THEY OFFERED A [place] APPT DUE TO URGENCY. NAVIGATOR STATED THAT SHE IS OKAY WITH THE WAITING PERIOD AND NEEDS HIM SCHEDULED THERE. PLEASE ADVISE, THANK YOU. EDWARD WOULD LIKE FOR YOU TO CALL HER INSTEAD FOR THE SCHEDULING CHANGE.

## 2023-06-05 RX ORDER — FUROSEMIDE 40 MG/1
TABLET ORAL
Qty: 30 TABLET | Refills: 0 | Status: SHIPPED | OUTPATIENT
Start: 2023-06-05

## 2023-06-05 NOTE — TELEPHONE ENCOUNTER
LF 3/9/23 by [place] cardiology, pt is switching to [facility] [place], operated by [facility] cardiology

## 2023-06-09 DIAGNOSIS — E03.8 OTHER SPECIFIED HYPOTHYROIDISM: ICD-10-CM

## 2023-06-09 RX ORDER — LEVOTHYROXINE SODIUM 137 UG/1
TABLET ORAL
Qty: 90 TABLET | Refills: 0 | Status: SHIPPED | OUTPATIENT
Start: 2023-06-09

## 2023-06-13 ENCOUNTER — OFFICE VISIT (OUTPATIENT)
Dept: NEUROLOGY | Facility: CLINIC | Age: 78
End: 2023-06-13
Payer: MEDICARE

## 2023-06-13 VITALS
DIASTOLIC BLOOD PRESSURE: 53 MMHG | HEIGHT: 68 IN | SYSTOLIC BLOOD PRESSURE: 152 MMHG | WEIGHT: 149.8 LBS | HEART RATE: 58 BPM | OXYGEN SATURATION: 100 % | BODY MASS INDEX: 22.7 KG/M2

## 2023-06-13 DIAGNOSIS — I65.23 BILATERAL CAROTID ARTERY STENOSIS: Primary | ICD-10-CM

## 2023-06-13 PROCEDURE — 3078F DIAST BP <80 MM HG: CPT | Performed by: PSYCHIATRY & NEUROLOGY

## 2023-06-13 PROCEDURE — 1160F RVW MEDS BY RX/DR IN RCRD: CPT | Performed by: PSYCHIATRY & NEUROLOGY

## 2023-06-13 PROCEDURE — 3077F SYST BP >= 140 MM HG: CPT | Performed by: PSYCHIATRY & NEUROLOGY

## 2023-06-13 PROCEDURE — 1159F MED LIST DOCD IN RCRD: CPT | Performed by: PSYCHIATRY & NEUROLOGY

## 2023-06-13 PROCEDURE — 99214 OFFICE O/P EST MOD 30 MIN: CPT | Performed by: PSYCHIATRY & NEUROLOGY

## 2023-06-13 RX ORDER — MEMANTINE HYDROCHLORIDE 5 MG/1
5 TABLET ORAL 2 TIMES DAILY
Qty: 60 TABLET | Refills: 6 | Status: SHIPPED | OUTPATIENT
Start: 2023-06-13 | End: 2024-06-12

## 2023-06-13 NOTE — PROGRESS NOTES
This is a very pleasant 77-year-old gentleman who I initially seen in consultation for memory decline which was likely combination of vascular dementia and recent head trauma.  As part of his work-up he had severe stenosis in his right internal carotid artery and bilateral carotid bulb plaques.  His MRA that showed 81% stenosis of the proximal right ICA and 47 (the left ICA.  I did send him to vascular neurosurgery where he had multiple stents done.  This was done at the [facility] I do not have all these records.        He reports no new neurologic issues since I saw him last.  He states that Namenda helped a little bit but has not helped a lot.  He was not able to tolerate donepezil.  His Xarelto was recently switched to Eliquis.  Overall he states that he feels good with no major new issues at today's visit.        In summary this a very pleasant 77-year-old gentleman who has cognitive issues likely in combination from vascular disease and head trauma.  For secondary stroke prevention he should continue Eliquis and good cholesterol and blood pressure control.  He was not able to tolerate statin therapy is currently on Zetia.  He should continue to follow-up with vascular neurosurgery.  I am going to increase his Namenda.  He will follow-up with me on an as-needed basis.  Of course if he were to have any new neurologic issues I be happy to see him again.  If he were to have any acute focal neurologic deficits to go straight to the emergency room.        I spent 30 minutes in patient care.

## 2023-06-14 ENCOUNTER — PATIENT OUTREACH (OUTPATIENT)
Dept: CASE MANAGEMENT | Facility: OTHER | Age: 78
End: 2023-06-14
Payer: MEDICARE

## 2023-06-14 DIAGNOSIS — D63.8 ANEMIA, CHRONIC DISEASE: Primary | ICD-10-CM

## 2023-06-14 NOTE — OUTREACH NOTE
Care Coordination    Patient was seen by neurology and instructed to follow up on as needed basis.  Will disucss with PCP at visit.     His clearance for EGD needs to be changed to different provider, will contact cardiology office in am.

## 2023-06-19 ENCOUNTER — TELEPHONE (OUTPATIENT)
Dept: SURGERY | Facility: CLINIC | Age: 78
End: 2023-06-19
Payer: COMMERCIAL

## 2023-06-19 NOTE — TELEPHONE ENCOUNTER
Dr Vela's office called about the letter that was sent on 5/25 for clearance. They are asking that it be sent to Dr Galvez instead. He is in the same office but it needs to go to him as he treats this patient.

## 2023-07-27 RX ORDER — MEMANTINE HYDROCHLORIDE 5 MG/1
5 TABLET ORAL 2 TIMES DAILY
Qty: 180 TABLET | Refills: 3 | Status: SHIPPED | OUTPATIENT
Start: 2023-07-27

## 2023-07-27 NOTE — TELEPHONE ENCOUNTER
Noted medication was discontinued in chart by pts pcp. He reported to his pcp that Dr. Briseno told him Namenda wasn't going to work for him.         Noted in dr. Chavez note he mentions increasing dose.         Called and spoke with pt about this. We believe he might have misunderstood. He is ok with going back on Namenda, increased dosage, 5mg BID instead of once a day.       -clr

## 2023-09-07 DIAGNOSIS — E03.8 OTHER SPECIFIED HYPOTHYROIDISM: ICD-10-CM

## 2023-09-07 RX ORDER — LEVOTHYROXINE SODIUM 137 UG/1
TABLET ORAL
Qty: 90 TABLET | Refills: 0 | Status: SHIPPED | OUTPATIENT
Start: 2023-09-07

## 2023-09-20 ENCOUNTER — PATIENT OUTREACH (OUTPATIENT)
Dept: CASE MANAGEMENT | Facility: OTHER | Age: 78
End: 2023-09-20
Payer: COMMERCIAL

## 2023-09-20 DIAGNOSIS — D63.8 ANEMIA, CHRONIC DISEASE: Primary | ICD-10-CM

## 2023-09-20 NOTE — OUTREACH NOTE
"AMBULATORY CASE MANAGEMENT NOTE    Name and Relationship of Patient/Support Person: Dixon Amador \"Long\" - Self    Long called stating that he missed his appointment yesterday due to oversleeping.  He was seen by his hematologist, notes requested.  Inquired if he got an iron infusion, he states no only PO iron. Ordering PET scan possibly?  He said MRI. He is sched at [email or web address], can get done locally. Turned on call reminders for him.      Reviewed care with him:  Has follow up with cardiology in Jan. He states that the doctor told him he was at a great risk to do any surgery and already had a stent, so nothing else to do.     He states [facility] neurology released him.   He is seeing derm for skin cancer.   He did the hemocult cards x 3 negative. Declined colonoscopy referral.  Seeing pain management - [facility] - last visit Oct 2021 - was supposed to follow up in 3 mos.  Did not    Shari R  Ambulatory Case Management    9/20/2023, 11:45 EDT  "

## 2023-09-22 ENCOUNTER — TELEPHONE (OUTPATIENT)
Dept: FAMILY MEDICINE CLINIC | Age: 78
End: 2023-09-22
Payer: COMMERCIAL

## 2023-09-22 NOTE — TELEPHONE ENCOUNTER
Pt was called due to missing his appt on 09/18/2023. Pt had already r/s, and was informed of the no-show policy

## 2023-09-28 DIAGNOSIS — R19.5 DARK STOOLS: ICD-10-CM

## 2023-09-28 DIAGNOSIS — Z79.01 LONG TERM (CURRENT) USE OF ANTICOAGULANTS: ICD-10-CM

## 2023-09-28 RX ORDER — PANTOPRAZOLE SODIUM 40 MG/1
40 TABLET, DELAYED RELEASE ORAL DAILY
Qty: 90 TABLET | Refills: 0 | Status: SHIPPED | OUTPATIENT
Start: 2023-09-28

## 2023-09-29 ENCOUNTER — PATIENT OUTREACH (OUTPATIENT)
Dept: CASE MANAGEMENT | Facility: OTHER | Age: 78
End: 2023-09-29
Payer: COMMERCIAL

## 2023-09-29 DIAGNOSIS — M06.042 RHEUMATOID ARTHRITIS WITHOUT RHEUMATOID FACTOR, LEFT HAND: ICD-10-CM

## 2023-09-29 DIAGNOSIS — I48.91 ATRIAL FIBRILLATION, UNSPECIFIED TYPE: ICD-10-CM

## 2023-09-29 DIAGNOSIS — I10 ESSENTIAL (PRIMARY) HYPERTENSION: ICD-10-CM

## 2023-09-29 DIAGNOSIS — D63.8 ANEMIA, CHRONIC DISEASE: Primary | ICD-10-CM

## 2023-09-29 NOTE — OUTREACH NOTE
"AMBULATORY CASE MANAGEMENT NOTE    Name and Relationship of Patient/Support Person: Dixon Amador \"Long\" - Self    Chart review.  Patient was supposed to be scheduled for CT chest/abd/pelvis at [facility], when I called to inquire, they do not have him in the system.  Dr. Aldana office is closed on Friday, faxed a request to send labs/imaging if any.  He is supposed to follow up with them in 4 weeks from the mid- Sept appointment.      Called patient and left a message to remind  him of upcoming appointment Monday.      Shari BERNABE  Ambulatory Case Management    9/29/2023, 10:21 EDT  [facility] End of Month Documentation    This Chronic Medical Management Care Plan for Dixon Amador, 77 y.o. male, has been established; reviewed and a new plan of care implemented for the month of September.  A cumulative time of 26  minutes was spent on this patient record this month, including electronic communication with primary care provider; electronic communication with other providers; chart review; phone call with patient.    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; Essential (primary) hypertension; Dysphagia, unspecified; Constipation, unspecified; Cervical root disorders, not elsewhere classified; Cerebral infarction due to unspecified occlusion or stenosis of unspecified cerebral artery; Arthritis; Esophageal reflux; Ulcerative lesion; Other long term (current) drug therapy; Bleeding gums; 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; History of tongue cancer; 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; Neck pain; Post concussion syndrome; Thoracic back pain; " Carotid stenosis, right; Chronic, continuous use of opioids; History of non-ST elevation myocardial infarction (NSTEMI); Psoriasis; Rheumatoid factor positive; Long term (current) use of anticoagulants; Congestive heart failure; Atrial fibrillation; Anemia, chronic disease; Hereditary hemochromatosis; Hepatomegaly; Secondary polycythemia (history of); History of basal cell cancer; History of esophageal cancer; History of peptic ulcer disease; Other sleep apnea; Macular degeneration, bilateral; Internal carotid artery stent present (Right); and Epigastric abdominal pain on their problem list., the following items were addressed: referrals to community service providers; transitions to medical care and any changes can be found within the plan section of the note.  A detailed listing of time spent for chronic care management is tracked within each outreach encounter.  Current medications include:  has a current medication list which includes the following prescription(s): apixaban, diclofenac sodium, ezetimibe, ferrous sulfate, furosemide, hydrocodone-acetaminophen, hydroxyzine, levothyroxine, losartan, melatonin, memantine, metoprolol succinate xl, pantoprazole, polyethylene glycol, ticagrelor, and trazodone. and the patient is reported to be patient is compliant with medication protocol,  Medications are reported to be effective.  Regarding these diagnoses, referrals were made to the following provider(s):  specialists.  All notes on chart for PCP to review.    The patient was monitored remotely for blood pressure; mood & behavior; medications.    The patient's physical needs include:  medication education; physician referral.     The patient's mental support needs include:  continued support    The patient's cognitive support needs include:  continued support    The patient's psychosocial support needs include:  no opportunity for leisure activities    The patient's functional needs include: medication education;  resources for disability needs    The patient's environmental needs include:  no involvement in outside activities or no access to other activities    Care Plan overall comments:  Attempting to consolidate providers to a local area.  Will work with family to attempt.    Refer to previous outreach notes for more information on the areas listed above.    Monthly Billing Diagnoses  (D63.8) Anemia, chronic disease    (I48.91) Atrial fibrillation, unspecified type    (I10) Essential (primary) hypertension    (M06.042) 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, left hand    Medications   Medications have been reconciled    Care Plan progress this month:      Recently Modified Care Plans Updates made since 8/29/2023 12:00 AM      No recently modified care plans.            Current Specialty Plan of Care Status signed by both patient and provider    Instructions   Patient was provided an electronic copy of care plan  CCM services were explained and offered and patient has accepted these services.  Patient has given their written consent to receive CCM services and understands that this includes the authorization of electronic communication of medical information with the other treating providers.  Patient understands that they may stop CCM services at any time and these changes will be effective at the end of the calendar month and will effectively revocate the agreement of CCM services.  Patient understands that only one practitioner can furnish and be paid for CCM services during one calendar month.  Patient also understands that there may be co-payment or deductible fees in association with CCM services.  Patient will continue with at least monthly follow-up calls with the Ambulatory .    Shari BERNABE  Ambulatory Case Management    9/29/2023, 10:31 EDT

## 2023-10-03 ENCOUNTER — OFFICE VISIT (OUTPATIENT)
Dept: FAMILY MEDICINE CLINIC | Age: 78
End: 2023-10-03
Payer: COMMERCIAL

## 2023-10-03 VITALS
OXYGEN SATURATION: 97 % | DIASTOLIC BLOOD PRESSURE: 66 MMHG | HEIGHT: 68 IN | SYSTOLIC BLOOD PRESSURE: 113 MMHG | WEIGHT: 143.8 LBS | HEART RATE: 67 BPM | BODY MASS INDEX: 21.79 KG/M2

## 2023-10-03 DIAGNOSIS — H61.21 IMPACTED CERUMEN OF RIGHT EAR: ICD-10-CM

## 2023-10-03 DIAGNOSIS — R26.89 IMBALANCE: ICD-10-CM

## 2023-10-03 DIAGNOSIS — I95.1 ORTHOSTATIC HYPOTENSION: Primary | ICD-10-CM

## 2023-10-03 PROCEDURE — 69209 REMOVE IMPACTED EAR WAX UNI: CPT | Performed by: NURSE PRACTITIONER

## 2023-10-03 PROCEDURE — 99214 OFFICE O/P EST MOD 30 MIN: CPT | Performed by: NURSE PRACTITIONER

## 2023-10-03 RX ORDER — SPIRONOLACTONE 25 MG/1
12.5 TABLET ORAL DAILY
COMMUNITY
Start: 2023-06-16

## 2023-10-03 RX ORDER — ASPIRIN 325 MG
325 TABLET, DELAYED RELEASE (ENTERIC COATED) ORAL
COMMUNITY
Start: 2023-01-18 | End: 2024-01-18
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
## 2023-10-03 NOTE — PROGRESS NOTES
Ear Cerumen Removal    Date/Time: 10/3/2023 1:52 PM  Performed by: Joshua Mendoza MA  Authorized by: Shayy Galaviz APRN     Anesthesia:  Local Anesthetic: none  Location details: right ear  Comments: Right ear cerumen successfully removed, Patient tolerated well.   Procedure type: irrigation   Sedation:  Patient sedated: no

## 2023-11-13 RX ORDER — FUROSEMIDE 40 MG/1
TABLET ORAL
Qty: 45 TABLET | Refills: 1 | Status: SHIPPED | OUTPATIENT
Start: 2023-11-13

## 2023-12-03 DIAGNOSIS — I10 ESSENTIAL (PRIMARY) HYPERTENSION: ICD-10-CM

## 2023-12-04 RX ORDER — LOSARTAN POTASSIUM 100 MG/1
100 TABLET ORAL DAILY
Qty: 90 TABLET | Refills: 1 | Status: SHIPPED | OUTPATIENT
Start: 2023-12-04

## 2023-12-06 DIAGNOSIS — E03.8 OTHER SPECIFIED HYPOTHYROIDISM: ICD-10-CM

## 2023-12-06 RX ORDER — LEVOTHYROXINE SODIUM 137 UG/1
TABLET ORAL
Qty: 90 TABLET | Refills: 1 | Status: SHIPPED | OUTPATIENT
Start: 2023-12-06

## 2024-01-08 ENCOUNTER — PATIENT OUTREACH (OUTPATIENT)
Dept: CASE MANAGEMENT | Facility: OTHER | Age: 79
End: 2024-01-08
Payer: COMMERCIAL

## 2024-01-08 DIAGNOSIS — D63.8 ANEMIA, CHRONIC DISEASE: Primary | ICD-10-CM

## 2024-01-08 NOTE — OUTREACH NOTE
AMBULATORY CASE MANAGEMENT NOTE    Name and Relationship of Patient/Support Person:  -     Reached out to Hematology to fax last set of labs to us.    Called and left message to check in - will remind of cardiology, see if any falls with dizziness.   Will discharge from program, he is doing quite well.      Education Documentation  No documentation found.        Shari BERNABE  Ambulatory Case Management    1/8/2024, 13:13 EST

## 2024-01-09 DIAGNOSIS — R19.5 DARK STOOLS: ICD-10-CM

## 2024-01-09 DIAGNOSIS — Z79.01 LONG TERM (CURRENT) USE OF ANTICOAGULANTS: ICD-10-CM

## 2024-01-10 RX ORDER — PANTOPRAZOLE SODIUM 40 MG/1
40 TABLET, DELAYED RELEASE ORAL DAILY
Qty: 90 TABLET | Refills: 0 | Status: SHIPPED | OUTPATIENT
Start: 2024-01-10

## 2024-01-11 ENCOUNTER — TELEPHONE (OUTPATIENT)
Dept: CASE MANAGEMENT | Facility: OTHER | Age: 79
End: 2024-01-11
Payer: COMMERCIAL

## 2024-01-11 NOTE — TELEPHONE ENCOUNTER
Long called wanting to know the date and time of his upcoming appointments.  Information given.  He also reports that he has an upcoming appointment with oncology and had some scans about 4 weeks ago.  Called [facility] to get a copy of the report.

## 2024-01-24 DIAGNOSIS — F51.01 PRIMARY INSOMNIA: ICD-10-CM

## 2024-01-24 RX ORDER — HYDROXYZINE 50 MG/1
50 TABLET, FILM COATED ORAL NIGHTLY PRN
Qty: 60 TABLET | Refills: 1 | Status: SHIPPED | OUTPATIENT
Start: 2024-01-24

## 2024-03-02 ENCOUNTER — DOCUMENTATION (OUTPATIENT)
Dept: FAMILY MEDICINE CLINIC | Age: 79
End: 2024-03-02
Payer: COMMERCIAL

## 2024-03-02 DIAGNOSIS — I21.4 NSTEMI (NON-ST ELEVATED MYOCARDIAL INFARCTION): ICD-10-CM

## 2024-03-02 DIAGNOSIS — I48.91 ATRIAL FIBRILLATION, UNSPECIFIED TYPE: Primary | ICD-10-CM

## 2024-03-02 NOTE — PROGRESS NOTES
Long called this afternoon for a refill on Eliquis and Brilinta.  Per his request, I have sent a 30-day supply of these to [place].  It is unusual to be on both medications given potential bleeding risk.  I mentioned to him that he had missed a couple of cardiology follow up visits.  He asked that we help him schedule with cardiology in [place] rather than [place].  I will forward this to Shayy and her team for action.  Thanks.

## 2024-03-04 ENCOUNTER — TELEPHONE (OUTPATIENT)
Dept: FAMILY MEDICINE CLINIC | Age: 79
End: 2024-03-04
Payer: COMMERCIAL

## 2024-03-04 NOTE — TELEPHONE ENCOUNTER
Pt inf that the requested rx were sent to [facility] on [address] , he said he would pick them up from there .

## 2024-03-04 NOTE — TELEPHONE ENCOUNTER
Patient called stated he has been out of meds for 3 days needs refill on his eliquis 5mg, and brilinta 90 mg [facility]

## 2024-03-08 ENCOUNTER — TELEPHONE (OUTPATIENT)
Dept: CARDIOLOGY | Facility: CLINIC | Age: 79
End: 2024-03-08
Payer: COMMERCIAL

## 2024-03-08 NOTE — TELEPHONE ENCOUNTER
Caller: BRIANDA    Relationship: WITH PCP'S OFFICE     Best call back number: [phone]    Who is your current provider:     Is your current provider offboarding? NO    Who would you like your new provider to be: ANY PROVIDER IN [place]    What are your reasons for transferring care: TRANSPORTATION IS EASIER TO THE [place] OFFICE

## 2024-03-14 DIAGNOSIS — E78.5 HYPERLIPIDEMIA, UNSPECIFIED HYPERLIPIDEMIA TYPE: ICD-10-CM

## 2024-03-15 RX ORDER — EZETIMIBE 10 MG/1
TABLET ORAL
Qty: 90 TABLET | Refills: 1 | Status: SHIPPED | OUTPATIENT
Start: 2024-03-15

## 2024-03-21 DIAGNOSIS — I10 ESSENTIAL (PRIMARY) HYPERTENSION: ICD-10-CM

## 2024-03-22 RX ORDER — AMLODIPINE BESYLATE 10 MG/1
10 TABLET ORAL DAILY
Qty: 90 TABLET | Refills: 1 | OUTPATIENT
Start: 2024-03-22

## 2024-03-28 DIAGNOSIS — I21.4 NSTEMI (NON-ST ELEVATED MYOCARDIAL INFARCTION): ICD-10-CM

## 2024-03-28 DIAGNOSIS — I48.91 ATRIAL FIBRILLATION, UNSPECIFIED TYPE: ICD-10-CM

## 2024-04-01 ENCOUNTER — OFFICE VISIT (OUTPATIENT)
Dept: CARDIOLOGY | Facility: CLINIC | Age: 79
End: 2024-04-01
Payer: COMMERCIAL

## 2024-04-01 VITALS
SYSTOLIC BLOOD PRESSURE: 146 MMHG | WEIGHT: 146.2 LBS | BODY MASS INDEX: 22.16 KG/M2 | HEIGHT: 68 IN | HEART RATE: 55 BPM | DIASTOLIC BLOOD PRESSURE: 73 MMHG

## 2024-04-01 DIAGNOSIS — I73.9 PVD (PERIPHERAL VASCULAR DISEASE): ICD-10-CM

## 2024-04-01 DIAGNOSIS — E78.2 HYPERLIPEMIA, MIXED: ICD-10-CM

## 2024-04-01 DIAGNOSIS — I10 HYPERTENSION, ESSENTIAL: ICD-10-CM

## 2024-04-01 DIAGNOSIS — I48.91 ATRIAL FIBRILLATION, UNSPECIFIED TYPE: ICD-10-CM

## 2024-04-01 DIAGNOSIS — I21.4 NSTEMI (NON-ST ELEVATED MYOCARDIAL INFARCTION): ICD-10-CM

## 2024-04-01 DIAGNOSIS — I25.10 CORONARY ARTERY DISEASE INVOLVING NATIVE CORONARY ARTERY OF NATIVE HEART WITHOUT ANGINA PECTORIS: Primary | ICD-10-CM

## 2024-04-01 PROCEDURE — 99214 OFFICE O/P EST MOD 30 MIN: CPT | Performed by: NURSE PRACTITIONER

## 2024-04-01 RX ORDER — APIXABAN 5 MG/1
5 TABLET, FILM COATED ORAL 2 TIMES DAILY
Qty: 180 TABLET | Refills: 1 | Status: SHIPPED | OUTPATIENT
Start: 2024-04-01 | End: 2024-04-01 | Stop reason: SDUPTHER

## 2024-04-01 RX ORDER — TICAGRELOR 90 MG/1
90 TABLET ORAL 2 TIMES DAILY
Qty: 180 TABLET | Refills: 1 | Status: SHIPPED | OUTPATIENT
Start: 2024-04-01 | End: 2024-04-01 | Stop reason: SDUPTHER
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
## 2024-04-11 DIAGNOSIS — Z79.01 LONG TERM (CURRENT) USE OF ANTICOAGULANTS: ICD-10-CM

## 2024-04-11 DIAGNOSIS — R19.5 DARK STOOLS: ICD-10-CM

## 2024-04-11 RX ORDER — PANTOPRAZOLE SODIUM 40 MG/1
40 TABLET, DELAYED RELEASE ORAL DAILY
Qty: 90 TABLET | Refills: 0 | Status: SHIPPED | OUTPATIENT
Start: 2024-04-11

## 2024-04-22 ENCOUNTER — OFFICE VISIT (OUTPATIENT)
Dept: FAMILY MEDICINE CLINIC | Age: 79
End: 2024-04-22
Payer: COMMERCIAL

## 2024-04-22 ENCOUNTER — LAB (OUTPATIENT)
Dept: LAB | Facility: HOSPITAL | Age: 79
End: 2024-04-22
Payer: COMMERCIAL

## 2024-04-22 VITALS
DIASTOLIC BLOOD PRESSURE: 92 MMHG | BODY MASS INDEX: 21.55 KG/M2 | OXYGEN SATURATION: 97 % | HEART RATE: 69 BPM | SYSTOLIC BLOOD PRESSURE: 171 MMHG | WEIGHT: 142.2 LBS | HEIGHT: 68 IN

## 2024-04-22 DIAGNOSIS — E78.5 HYPERLIPIDEMIA, UNSPECIFIED HYPERLIPIDEMIA TYPE: ICD-10-CM

## 2024-04-22 DIAGNOSIS — Z00.00 MEDICARE ANNUAL WELLNESS VISIT, SUBSEQUENT: Primary | ICD-10-CM

## 2024-04-22 DIAGNOSIS — E61.1 IRON DEFICIENCY: ICD-10-CM

## 2024-04-22 DIAGNOSIS — E03.2 HYPOTHYROIDISM DUE TO MEDICAMENTS AND OTHER EXOGENOUS SUBSTANCES: ICD-10-CM

## 2024-04-22 DIAGNOSIS — F51.01 PRIMARY INSOMNIA: ICD-10-CM

## 2024-04-22 DIAGNOSIS — I10 ESSENTIAL (PRIMARY) HYPERTENSION: ICD-10-CM

## 2024-04-22 DIAGNOSIS — Z79.01 LONG TERM (CURRENT) USE OF ANTICOAGULANTS: ICD-10-CM

## 2024-04-22 DIAGNOSIS — D63.8 ANEMIA, CHRONIC DISEASE: ICD-10-CM

## 2024-04-22 DIAGNOSIS — Z13.6 SCREENING FOR CARDIOVASCULAR CONDITION: ICD-10-CM

## 2024-04-22 DIAGNOSIS — E83.110 HEREDITARY HEMOCHROMATOSIS: ICD-10-CM

## 2024-04-22 PROBLEM — R19.5 DARK STOOLS: Status: ACTIVE | Noted: 2024-04-22

## 2024-04-22 LAB
ALBUMIN SERPL-MCNC: 4.3 G/DL (ref 3.5–5.2)
ALBUMIN/GLOB SERPL: 1.6 G/DL
ALP SERPL-CCNC: 89 U/L (ref 39–117)
ALT SERPL W P-5'-P-CCNC: 15 U/L (ref 1–41)
ANION GAP SERPL CALCULATED.3IONS-SCNC: 10 MMOL/L (ref 5–15)
AST SERPL-CCNC: 20 U/L (ref 1–40)
BILIRUB SERPL-MCNC: 0.5 MG/DL (ref 0–1.2)
BUN SERPL-MCNC: 16 MG/DL (ref 8–23)
BUN/CREAT SERPL: 13 (ref 7–25)
CALCIUM SPEC-SCNC: 9.1 MG/DL (ref 8.6–10.5)
CHLORIDE SERPL-SCNC: 104 MMOL/L (ref 98–107)
CHOLEST SERPL-MCNC: 137 MG/DL (ref 0–200)
CO2 SERPL-SCNC: 26 MMOL/L (ref 22–29)
CREAT SERPL-MCNC: 1.23 MG/DL (ref 0.76–1.27)
DEPRECATED RDW RBC AUTO: 46.5 FL (ref 37–54)
EGFRCR SERPLBLD CKD-EPI 2021: 60.1 ML/MIN/1.73
ERYTHROCYTE [DISTWIDTH] IN BLOOD BY AUTOMATED COUNT: 14.2 % (ref 12.3–15.4)
FERRITIN SERPL-MCNC: 66.3 NG/ML (ref 30–400)
GLOBULIN UR ELPH-MCNC: 2.7 GM/DL
GLUCOSE SERPL-MCNC: 104 MG/DL (ref 65–99)
HCT VFR BLD AUTO: 44 % (ref 37.5–51)
HDLC SERPL-MCNC: 56 MG/DL (ref 40–60)
HGB BLD-MCNC: 14.4 G/DL (ref 13–17.7)
IRON 24H UR-MRATE: 40 MCG/DL (ref 59–158)
LDLC SERPL CALC-MCNC: 62 MG/DL (ref 0–100)
LDLC/HDLC SERPL: 1.06 {RATIO}
MCH RBC QN AUTO: 29.1 PG (ref 26.6–33)
MCHC RBC AUTO-ENTMCNC: 32.7 G/DL (ref 31.5–35.7)
MCV RBC AUTO: 89.1 FL (ref 79–97)
PLATELET # BLD AUTO: 267 10*3/MM3 (ref 140–450)
PMV BLD AUTO: 10 FL (ref 6–12)
POTASSIUM SERPL-SCNC: 4.3 MMOL/L (ref 3.5–5.2)
PROT SERPL-MCNC: 7 G/DL (ref 6–8.5)
RBC # BLD AUTO: 4.94 10*6/MM3 (ref 4.14–5.8)
SODIUM SERPL-SCNC: 140 MMOL/L (ref 136–145)
TRIGL SERPL-MCNC: 107 MG/DL (ref 0–150)
TSH SERPL DL<=0.05 MIU/L-ACNC: 3.83 UIU/ML (ref 0.27–4.2)
VLDLC SERPL-MCNC: 19 MG/DL (ref 5–40)
WBC NRBC COR # BLD AUTO: 5.84 10*3/MM3 (ref 3.4–10.8)

## 2024-04-22 PROCEDURE — 84443 ASSAY THYROID STIM HORMONE: CPT

## 2024-04-22 PROCEDURE — 83540 ASSAY OF IRON: CPT

## 2024-04-22 PROCEDURE — 85027 COMPLETE CBC AUTOMATED: CPT

## 2024-04-22 PROCEDURE — 80053 COMPREHEN METABOLIC PANEL: CPT

## 2024-04-22 PROCEDURE — 36415 COLL VENOUS BLD VENIPUNCTURE: CPT

## 2024-04-22 PROCEDURE — 82728 ASSAY OF FERRITIN: CPT

## 2024-04-22 PROCEDURE — 80061 LIPID PANEL: CPT

## 2024-04-22 RX ORDER — DOXYCYCLINE 25 MG/5ML
POWDER, FOR SUSPENSION ORAL
COMMUNITY
Start: 2024-04-02 | End: 2024-04-22

## 2024-04-22 RX ORDER — EZETIMIBE 10 MG/1
10 TABLET ORAL DAILY
Qty: 90 TABLET | Refills: 1 | Status: SHIPPED | OUTPATIENT
Start: 2024-04-22

## 2024-04-22 RX ORDER — HYDROXYZINE 50 MG/1
50 TABLET, FILM COATED ORAL NIGHTLY PRN
Qty: 90 TABLET | Refills: 1 | Status: SHIPPED | OUTPATIENT
Start: 2024-04-22

## 2024-04-22 RX ORDER — LEVOTHYROXINE SODIUM 137 UG/1
137 TABLET ORAL DAILY
Qty: 90 TABLET | Refills: 1 | Status: SHIPPED | OUTPATIENT
Start: 2024-04-22

## 2024-04-22 RX ORDER — PANTOPRAZOLE SODIUM 40 MG/1
40 TABLET, DELAYED RELEASE ORAL DAILY
Qty: 90 TABLET | Refills: 0 | Status: SHIPPED | OUTPATIENT
Start: 2024-04-22

## 2024-04-22 RX ORDER — FERROUS SULFATE 325(65) MG
325 TABLET ORAL
Qty: 30 TABLET | Refills: 1 | Status: SHIPPED | OUTPATIENT
Start: 2024-04-22

## 2024-04-22 RX ORDER — LOSARTAN POTASSIUM 100 MG/1
100 TABLET ORAL DAILY
Qty: 90 TABLET | Refills: 1 | Status: SHIPPED | OUTPATIENT
Start: 2024-04-22

## 2024-04-22 RX ORDER — FUROSEMIDE 20 MG/1
10 TABLET ORAL DAILY
Qty: 45 TABLET | Refills: 1 | Status: SHIPPED | OUTPATIENT
Start: 2024-04-22

## 2024-04-22 NOTE — PROGRESS NOTES
The ABCs of the Annual Wellness Visit  [place] to Medicare Visit    Subjective     Dixon Amador is a 78 y.o. male who presents for a  Welcome to Medicare Visit.    The following portions of the patient's history were reviewed and   updated as appropriate: allergies, current medications, past family history, past medical history, past social history, past surgical history, and problem list.     Compared to one year ago, the patient feels his physical   health is the same.    Compared to one year ago, the patient feels his mental   health is the same.    Recent Hospitalizations:  He was not admitted to the hospital during the last year.       Current Medical Providers:  Patient Care Team:  Shayy Galaviz APRN as PCP - General (Nurse Practitioner)  Cathleen Oviedo APRN as Nurse Practitioner (Dermatology)  Conchis Weems PA (Pain Medicine)  RAY Galvez MD as Consulting Physician (Cardiology)  Lencho Aldana MD as Consulting Physician (Hematology and Oncology)  Flor Pond MD (Pain Medicine)  Trinidad Green APRN as Nurse Practitioner (Cardiology)    Outpatient Medications Prior to Visit   Medication Sig Dispense Refill    apixaban (Eliquis) 5 MG tablet tablet Take 1 tablet by mouth 2 (Two) Times a Day. 180 tablet 3    Diclofenac Sodium (VOLTAREN) 1 % gel gel APPLY 2 GRAMS TOPICALLY FOUR TIMES DAILY AS NEEDED FOR PAIN      HYDROcodone-acetaminophen (NORCO) 7.5-325 MG per tablet Take 1 tablet by mouth Every 6 (Six) Hours As Needed.      memantine (NAMENDA) 5 MG tablet Take 1 tablet by mouth 2 (Two) Times a Day. 180 tablet 3    metoprolol succinate XL (TOPROL-XL) 50 MG 24 hr tablet Take 1 tablet by mouth Daily.      ticagrelor (Brilinta) 90 MG tablet tablet Take 1 tablet by mouth 2 (Two) Times a Day. 180 tablet 3    doxycycline (VIBRAMYCIN) 25 MG/5ML reconstituted suspension oral suspension       ezetimibe (ZETIA) 10 MG tablet TAKE 1 TABLET BY MOUTH DAILY 90 tablet 1    ferrous sulfate  (FerrouSul) 325 (65 FE) MG tablet Take 1 tablet by mouth Daily With Breakfast. 30 tablet 1    furosemide (LASIX) 40 MG tablet TAKE 1/2 TABLET BY MOUTH DAILY 45 tablet 1    hydrOXYzine (ATARAX) 50 MG tablet TAKE 1 TABLET BY MOUTH EVERY NIGHT AT BEDTIME AS NEEDED 60 tablet 1    levothyroxine (SYNTHROID, LEVOTHROID) 137 MCG tablet TAKE 1 TABLET BY MOUTH DAILY ON AN EMPTY STOMACH 90 tablet 1    losartan (COZAAR) 100 MG tablet TAKE 1 TABLET BY MOUTH DAILY 90 tablet 1    Melatonin 10 MG tablet Take  by mouth Every Night.      pantoprazole (PROTONIX) 40 MG EC tablet TAKE 1 TABLET BY MOUTH DAILY 90 tablet 0    polyethylene glycol (MIRALAX) 17 GM/SCOOP powder Take 17 g by mouth As Needed.      spironolactone (ALDACTONE) 25 MG tablet Take 0.5 tablets by mouth Daily.      traZODone (DESYREL) 50 MG tablet TAKE 1 TO 2 TABLETS BY MOUTH AT NIGHT AS NEEDED FOR SLEEP 180 tablet 1     No facility-administered medications prior to visit.       Opioid medication/s are on active medication list.  and I have evaluated his active treatment plan and pain score trends (see table).  There were no vitals filed for this visit.  I have reviewed the chart for potential of high risk medication and harmful drug interactions in the elderly.          Aspirin is not on active medication list.  Aspirin use is not indicated based on review of current medical condition/s. Risk of harm outweighs potential benefits.  .    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    Essential (primary) hypertension    Dysphagia, unspecified    Constipation, unspecified    Cervical root disorders, not elsewhere classified     "Cerebral infarction due to unspecified occlusion or stenosis of unspecified cerebral artery    Arthritis    Esophageal reflux    Ulcerative lesion    Other long term (current) drug therapy    Bleeding gums    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    History of tongue cancer    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    Neck pain    Post concussion syndrome    Thoracic back pain    Carotid stenosis, right    Chronic, continuous use of opioids    History of non-ST elevation myocardial infarction (NSTEMI)    Psoriasis    Rheumatoid factor positive    Long term (current) use of anticoagulants    Congestive heart failure    Atrial fibrillation    Anemia, chronic disease    Hereditary hemochromatosis    Hepatomegaly    Secondary polycythemia (history of)    History of basal cell cancer    History of esophageal cancer    History of peptic ulcer disease    Other sleep apnea    Macular degeneration, bilateral    Internal carotid artery stent present (Right)    Epigastric abdominal pain    Dark stools     Advance Care Planning   Advance Care Planning     Advance Directive is on file.  ACP discussion was held with the patient during this visit. One of his designees (Rohan Amador is )  advised that he get this updated and information provided        Objective   Vitals:    24 0803 24 0843   BP: 177/84 171/92   BP Location: Right arm Left arm   Patient Position: Sitting Sitting   Pulse: 61 69   SpO2: 97%    Weight: 64.5 kg (142 lb 3.2 oz)    Height: 172.7 cm (67.99\")      Estimated body mass index is 21.63 kg/m² as calculated from the following:    Height as of this encounter: 172.7 cm (67.99\").    Weight as of this encounter: 64.5 kg (142 lb 3.2 oz).    BMI is within normal parameters. No other follow-up for BMI required.      Does the patient have evidence of cognitive impairment?   No         Procedures       HEALTH RISK ASSESSMENT    Smoking Status:  Social History "     Tobacco Use   Smoking Status Former    Current packs/day: 0.00    Average packs/day: 1 pack/day for 36.0 years (36.0 ttl pk-yrs)    Types: Cigarettes    Start date:     Quit date:     Years since quittin.3   Smokeless Tobacco Never     Alcohol Consumption:  Social History     Substance and Sexual Activity   Alcohol Use Not Currently       Fall Risk Screen:    ADOLFO Fall Risk Assessment was completed, and patient is at HIGH risk for falls. Assessment completed on:2024    Depression Screen:       2024     8:06 AM   PHQ-2/PHQ-9 Depression Screening   Little Interest or Pleasure in Doing Things 0-->not at all   Feeling Down, Depressed or Hopeless 0-->not at all   PHQ-9: Brief Depression Severity Measure Score 0       Health Habits and Functional and Cognitive Screenin/22/2024     8:07 AM   Functional & Cognitive Status   Do you have difficulty preparing food and eating? No   Do you have difficulty bathing yourself, getting dressed or grooming yourself? No   Do you have difficulty using the toilet? No   Do you have difficulty moving around from place to place? No   Do you have trouble with steps or getting out of a bed or a chair? No   Current Diet Unhealthy Diet   Dental Exam Up to date   Eye Exam Up to date   Exercise (times per week) 0 times per week   Current Exercises Include No Regular Exercise   Do you need help using the phone?  No   Are you deaf or do you have serious difficulty hearing?  Yes   Do you need help to go to places out of walking distance? No   Do you need help shopping? No   Do you need help preparing meals?  No   Do you need help with housework?  No   Do you need help with laundry? No   Do you need help taking your medications? No   Do you need help managing money? No   Do you ever drive or ride in a car without wearing a seat belt? No   Have you felt unusual stress, anger or loneliness in the last month? No   Who do you live with? Spouse   If you need help, do  you have trouble finding someone available to you? No   Have you been bothered in the last four weeks by sexual problems? No   Do you have difficulty concentrating, remembering or making decisions? No       Visual Acuity:    No results found.    Age-appropriate Screening Schedule:  Refer to the list below for future screening recommendations based on patient's age, sex and/or medical conditions. Orders for these recommended tests are listed in the plan section. The patient has been provided with a written plan.    Health Maintenance   Topic Date Due    ZOSTER VACCINE (1 of 2) Never done    RSV Vaccine - Adults (1 - 1-dose 60+ series) Never done    COVID-19 Vaccine (5 - 2023-24 season) 09/01/2023    HEMOGLOBIN A1C  01/12/2024    URINE MICROALBUMIN  03/27/2024    DIABETIC EYE EXAM  05/15/2024    LIPID PANEL  07/12/2024    INFLUENZA VACCINE  08/01/2024    ANNUAL WELLNESS VISIT  04/22/2025    TDAP/TD VACCINES (3 - Td or Tdap) 03/16/2034    HEPATITIS C SCREENING  Completed    Pneumococcal Vaccine 65+  Completed    COLORECTAL CANCER SCREENING  Discontinued        CMS Preventative Services Quick Reference  Risk Factors Identified During Encounter    Chronic Pain:  continue with pain management   Fall Risk-High or Moderate: Discussed Fall Prevention in the home  Immunizations Discussed/Encouraged: Shingrix and RSV (Respiratory Syncytial Virus) and covid 19 (PATIENT DECLINES)  The above risks/problems have been discussed with the patient.  Pertinent information has been shared with the patient in the After Visit Summary.    Follow Up:   Initial Medicare Visit in one year    An After Visit Summary and PPPS were made available to the patient.      Additional E&M Note during same encounter follows:  Patient has multiple medical problems which are significant and separately identifiable that require additional work above and beyond the Medicare Wellness Visit.      Chief Complaint  Medicare Wellness-subsequent, Hyperlipidemia,  Hypertension, Insomnia, and Hypothyroidism    Subjective        HPI  Dixon Amador is also being seen today for Afib, HTN, HLD, iron deficiency, hypothyroidism, insomnia, memory impairment    Regarding insomnia, currently taking nothing but has been prescribed hydroxyzine, trazodone and melatonin in the past.  He has recently been having to help take care of his wife which has affected his sleep.  Advised to resume hydroxyzine    Long presents today for follow up on hyperlipidemia.  Previous values:   Lab Results   Component Value Date    CHOL 122 07/12/2023    CHLPL 174 11/12/2020    TRIG 98 07/12/2023    HDL 46 07/12/2023    LDL 58 07/12/2023      The ASCVD Risk score (Francisco FISHER, et al., 2019) failed to calculate for the following reasons:    The patient has a prior MI or stroke diagnosis  Currently taking medication :yes and reports compliant with medication which is ezetimibe(Zetia)  10 mg   No side effects reported from this medication .  No new concerns to discuss today.     Long presents today for follow up on Hypertension, CAD, previous PCI, Afib, carotid artery disease with history of stroke.    Current medication / treatment includes Losartan, Metoprolol, and furosemide (but does not take due to having to go to [facility] all the time), and is noncompliant with medications.  (Previously taking spironolactone but he reports not taking)  no side effects reported.    Home blood pressure monitoring readings reported as home BP checks: not checked  Medication changes are recommended at this time.    Currently taking pantoprazole for prevention secondary to anticoagulants including apixaban and ticagrelor     Taking namenda for memory    Long presents for follow up on hypothyroidism.   has been taking medication as prescribed.    Medication includes :  levothyroxine  137 mcg  Current symptoms include:  denies fatigue, weight changes, heat/cold intolerance, bowel/skin changes or CVS symptoms.   Last   Lab Results  "  Component Value Date    TSH 5.600 (H) 04/15/2022      He is currently under the care of pain management for his chronic neck/back pain and taking hydrocodone  7.5 mg    Recently seen in [place] [facility] for accident of laceration to left hand after a fall at home cutting on coffee cup    He has been losing weight but thinks due to stress of wifes health.  Feels that he is eating better now since she is better  Review of Systems   Constitutional:  Negative for fatigue and fever.   Respiratory:  Negative for cough and shortness of breath.    Cardiovascular:  Negative for chest pain.   Neurological:  Negative for dizziness.       Objective   Vital Signs:  /92 (BP Location: Left arm, Patient Position: Sitting)   Pulse 69   Ht 172.7 cm (67.99\")   Wt 64.5 kg (142 lb 3.2 oz)   SpO2 97%   BMI 21.63 kg/m²     Physical Exam                 Assessment and Plan   Diagnoses and all orders for this visit:    1. Medicare annual wellness visit, subsequent (Primary)    2. Anemia, chronic disease  Comments:  repeat levels  high risk due to medications - declines further colonoscopy unless necessary  Orders:  -     CBC No Differential; Future  -     Iron level; Future    3. 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  Dispense: 90 tablet; Refill: 1    4. Hereditary hemochromatosis  Comments:  ongoing monitoring  Orders:  -     Ferritin; Future    5. Primary insomnia  Comments:  Hydroxyzine PRN for sleep  Orders:  -     hydrOXYzine (ATARAX) 50 MG tablet; Take 1 tablet by mouth At Night As Needed (insomnia).  Dispense: 90 tablet; Refill: 1    6. Long term (current) use of anticoagulants  Comments:  will refer to general surgery due to dark stools and anticoag use   Orders:  -     pantoprazole (PROTONIX) 40 MG EC " tablet; Take 1 tablet by mouth Daily.  Dispense: 90 tablet; Refill: 0    7. Essential (primary) hypertension  Comments:  due to orthostasis, will reduce the furosemide to 10mg daily and encouraged him to resume taking secondry to HTN not well controlled   f/u 6-8weeks  Orders:  -     losartan (COZAAR) 100 MG tablet; Take 1 tablet by mouth Daily.  Dispense: 90 tablet; Refill: 1  -     furosemide (LASIX) 20 MG tablet; Take 0.5 tablets by mouth Daily.  Dispense: 45 tablet; Refill: 1    8. Hyperlipidemia, unspecified hyperlipidemia type  Comments:  continue zetia  Orders:  -     ezetimibe (ZETIA) 10 MG tablet; Take 1 tablet by mouth Daily.  Dispense: 90 tablet; Refill: 1    9. Iron deficiency  Comments:  repeat levels  Orders:  -     ferrous sulfate (FerrouSul) 325 (65 FE) MG tablet; Take 1 tablet by mouth Daily With Breakfast.  Dispense: 30 tablet; Refill: 1    10. Screening for cardiovascular condition  -     Comprehensive metabolic panel; Future  -     Lipid panel; Future             Follow Up   Return in about 6 weeks (around 6/3/2024) for Recheck BP.  Patient was given instructions and counseling regarding his condition or for health maintenance advice. Please see specific information pulled into the AVS if appropriate.       Patient has been erroneously marked as diabetic. Based on the available clinical information, he does not have diabetes and should therefore be excluded from diabetic health maintenance and quality measures for the remainder of the reporting period.

## 2024-07-18 ENCOUNTER — LAB (OUTPATIENT)
Dept: LAB | Facility: HOSPITAL | Age: 79
End: 2024-07-18
Payer: COMMERCIAL

## 2024-07-18 ENCOUNTER — OFFICE VISIT (OUTPATIENT)
Dept: FAMILY MEDICINE CLINIC | Age: 79
End: 2024-07-18
Payer: COMMERCIAL

## 2024-07-18 VITALS
DIASTOLIC BLOOD PRESSURE: 113 MMHG | WEIGHT: 135.6 LBS | BODY MASS INDEX: 20.55 KG/M2 | HEIGHT: 68 IN | TEMPERATURE: 98.2 F | HEART RATE: 57 BPM | SYSTOLIC BLOOD PRESSURE: 199 MMHG | OXYGEN SATURATION: 98 %

## 2024-07-18 DIAGNOSIS — E61.1 IRON DEFICIENCY: ICD-10-CM

## 2024-07-18 DIAGNOSIS — R53.1 GENERALIZED WEAKNESS: ICD-10-CM

## 2024-07-18 DIAGNOSIS — R53.83 FATIGUE, UNSPECIFIED TYPE: ICD-10-CM

## 2024-07-18 DIAGNOSIS — E83.110 HEREDITARY HEMOCHROMATOSIS: ICD-10-CM

## 2024-07-18 DIAGNOSIS — D63.8 ANEMIA, CHRONIC DISEASE: ICD-10-CM

## 2024-07-18 DIAGNOSIS — E03.2 HYPOTHYROIDISM DUE TO MEDICAMENTS AND OTHER EXOGENOUS SUBSTANCES: ICD-10-CM

## 2024-07-18 DIAGNOSIS — R63.4 WEIGHT LOSS, NON-INTENTIONAL: ICD-10-CM

## 2024-07-18 DIAGNOSIS — I10 ESSENTIAL (PRIMARY) HYPERTENSION: ICD-10-CM

## 2024-07-18 DIAGNOSIS — Z79.899 ENCOUNTER FOR MEDICATION MANAGEMENT: ICD-10-CM

## 2024-07-18 DIAGNOSIS — I50.9 OTHER CONGESTIVE HEART FAILURE: ICD-10-CM

## 2024-07-18 DIAGNOSIS — R13.10 DYSPHAGIA, UNSPECIFIED TYPE: ICD-10-CM

## 2024-07-18 DIAGNOSIS — I10 ESSENTIAL (PRIMARY) HYPERTENSION: Primary | ICD-10-CM

## 2024-07-18 LAB
ALBUMIN SERPL-MCNC: 4.2 G/DL (ref 3.5–5.2)
ALBUMIN/GLOB SERPL: 1.3 G/DL
ALP SERPL-CCNC: 116 U/L (ref 39–117)
ALT SERPL W P-5'-P-CCNC: 26 U/L (ref 1–41)
ANION GAP SERPL CALCULATED.3IONS-SCNC: 10.8 MMOL/L (ref 5–15)
AST SERPL-CCNC: 40 U/L (ref 1–40)
BILIRUB SERPL-MCNC: 0.5 MG/DL (ref 0–1.2)
BUN SERPL-MCNC: 13 MG/DL (ref 8–23)
BUN/CREAT SERPL: 10.6 (ref 7–25)
CALCIUM SPEC-SCNC: 9.2 MG/DL (ref 8.6–10.5)
CHLORIDE SERPL-SCNC: 101 MMOL/L (ref 98–107)
CO2 SERPL-SCNC: 28.2 MMOL/L (ref 22–29)
CREAT SERPL-MCNC: 1.23 MG/DL (ref 0.76–1.27)
DEPRECATED RDW RBC AUTO: 53.9 FL (ref 37–54)
EGFRCR SERPLBLD CKD-EPI 2021: 60.1 ML/MIN/1.73
ERYTHROCYTE [DISTWIDTH] IN BLOOD BY AUTOMATED COUNT: 16 % (ref 12.3–15.4)
FERRITIN SERPL-MCNC: 86 NG/ML (ref 30–400)
GLOBULIN UR ELPH-MCNC: 3.2 GM/DL
GLUCOSE SERPL-MCNC: 92 MG/DL (ref 65–99)
HCT VFR BLD AUTO: 48.5 % (ref 37.5–51)
HGB BLD-MCNC: 15.7 G/DL (ref 13–17.7)
IRON 24H UR-MRATE: 71 MCG/DL (ref 59–158)
MCH RBC QN AUTO: 29.2 PG (ref 26.6–33)
MCHC RBC AUTO-ENTMCNC: 32.4 G/DL (ref 31.5–35.7)
MCV RBC AUTO: 90.1 FL (ref 79–97)
PLATELET # BLD AUTO: 265 10*3/MM3 (ref 140–450)
PMV BLD AUTO: 9.6 FL (ref 6–12)
POTASSIUM SERPL-SCNC: 3.9 MMOL/L (ref 3.5–5.2)
PROT SERPL-MCNC: 7.4 G/DL (ref 6–8.5)
RBC # BLD AUTO: 5.38 10*6/MM3 (ref 4.14–5.8)
SODIUM SERPL-SCNC: 140 MMOL/L (ref 136–145)
T4 FREE SERPL-MCNC: 0.86 NG/DL (ref 0.92–1.68)
TSH SERPL DL<=0.05 MIU/L-ACNC: 26.6 UIU/ML (ref 0.27–4.2)
VIT B12 BLD-MCNC: 684 PG/ML (ref 211–946)
WBC NRBC COR # BLD AUTO: 7.82 10*3/MM3 (ref 3.4–10.8)

## 2024-07-18 PROCEDURE — 36415 COLL VENOUS BLD VENIPUNCTURE: CPT

## 2024-07-18 PROCEDURE — 82728 ASSAY OF FERRITIN: CPT

## 2024-07-18 PROCEDURE — 99214 OFFICE O/P EST MOD 30 MIN: CPT | Performed by: NURSE PRACTITIONER

## 2024-07-18 PROCEDURE — 83540 ASSAY OF IRON: CPT

## 2024-07-18 PROCEDURE — 80050 GENERAL HEALTH PANEL: CPT

## 2024-07-18 PROCEDURE — 84439 ASSAY OF FREE THYROXINE: CPT

## 2024-07-18 PROCEDURE — 82607 VITAMIN B-12: CPT

## 2024-07-18 RX ORDER — LOSARTAN POTASSIUM 100 MG/1
50 TABLET ORAL 2 TIMES DAILY
Qty: 90 TABLET | Refills: 1 | Status: SHIPPED | OUTPATIENT
Start: 2024-07-18
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
## 2024-07-23 DIAGNOSIS — E03.2 HYPOTHYROIDISM DUE TO MEDICAMENTS AND OTHER EXOGENOUS SUBSTANCES: Primary | ICD-10-CM

## 2024-08-07 ENCOUNTER — TELEPHONE (OUTPATIENT)
Dept: CARDIOLOGY | Facility: CLINIC | Age: 79
End: 2024-08-07
Payer: MEDICARE

## 2024-08-07 DIAGNOSIS — I10 ESSENTIAL (PRIMARY) HYPERTENSION: ICD-10-CM

## 2024-08-07 DIAGNOSIS — I21.4 NSTEMI (NON-ST ELEVATED MYOCARDIAL INFARCTION): ICD-10-CM

## 2024-08-07 DIAGNOSIS — I48.91 ATRIAL FIBRILLATION, UNSPECIFIED TYPE: ICD-10-CM

## 2024-08-07 RX ORDER — FUROSEMIDE 20 MG/1
10 TABLET ORAL DAILY
Qty: 45 TABLET | Refills: 3 | Status: SHIPPED | OUTPATIENT
Start: 2024-08-07

## 2024-08-07 RX ORDER — METOPROLOL SUCCINATE 50 MG/1
50 TABLET, EXTENDED RELEASE ORAL DAILY
Qty: 90 TABLET | Refills: 3 | Status: SHIPPED | OUTPATIENT
Start: 2024-08-07

## 2024-08-07 RX ORDER — LOSARTAN POTASSIUM 100 MG/1
50 TABLET ORAL 2 TIMES DAILY
Qty: 90 TABLET | Refills: 3 | Status: SHIPPED | OUTPATIENT
Start: 2024-08-07

## 2024-08-07 NOTE — TELEPHONE ENCOUNTER
Danielle with [facility] called to go over med list on pt and requested some refills. Sending those to veronika in [place] and reports high /110 with dizziness.  Verbally got instructions from marie to have danielle give pt half tab of losartan and check BP again in a few hours and if its not any better let us know later today. She understood. No other questions at this time.

## 2024-08-16 RX ORDER — TRAZODONE HYDROCHLORIDE 50 MG/1
TABLET ORAL
Qty: 180 TABLET | Refills: 1 | OUTPATIENT
Start: 2024-08-16

## 2024-08-21 ENCOUNTER — TELEPHONE (OUTPATIENT)
Dept: CARDIOLOGY | Facility: CLINIC | Age: 79
End: 2024-08-21
Payer: MEDICARE

## 2024-08-21 DIAGNOSIS — I21.4 NSTEMI (NON-ST ELEVATED MYOCARDIAL INFARCTION): ICD-10-CM

## 2024-08-21 DIAGNOSIS — I10 ESSENTIAL (PRIMARY) HYPERTENSION: ICD-10-CM

## 2024-08-21 DIAGNOSIS — I48.91 ATRIAL FIBRILLATION, UNSPECIFIED TYPE: ICD-10-CM

## 2024-08-21 DIAGNOSIS — F51.01 PRIMARY INSOMNIA: ICD-10-CM

## 2024-08-21 DIAGNOSIS — Z79.01 LONG TERM (CURRENT) USE OF ANTICOAGULANTS: ICD-10-CM

## 2024-08-21 DIAGNOSIS — E03.2 HYPOTHYROIDISM DUE TO MEDICAMENTS AND OTHER EXOGENOUS SUBSTANCES: ICD-10-CM

## 2024-08-21 RX ORDER — LOSARTAN POTASSIUM 100 MG/1
50 TABLET ORAL 2 TIMES DAILY
Qty: 90 TABLET | Refills: 3 | Status: SHIPPED | OUTPATIENT
Start: 2024-08-21

## 2024-08-21 RX ORDER — METOPROLOL SUCCINATE 50 MG/1
50 TABLET, EXTENDED RELEASE ORAL DAILY
Qty: 90 TABLET | Refills: 3 | Status: SHIPPED | OUTPATIENT
Start: 2024-08-21

## 2024-08-21 RX ORDER — FUROSEMIDE 20 MG/1
10 TABLET ORAL DAILY
Qty: 45 TABLET | Refills: 3 | Status: SHIPPED | OUTPATIENT
Start: 2024-08-21

## 2024-08-21 NOTE — TELEPHONE ENCOUNTER
RECEIVED REFILL REQUESTS VIA FAX FROM [facility].  KARIN PT AND HE SAID HE WOULD LIKE SCRIPTS SENT THERE INSTEAD OF [facility].  REFILLS SENT AS REQUESTED.

## 2024-08-21 NOTE — TELEPHONE ENCOUNTER
Caller: [facility] [place], [place] - [address] [phone] [place], [place] [id number] [place]    Relationship: Pharmacy    Requested Prescriptions:   Requested Prescriptions     Pending Prescriptions Disp Refills    pantoprazole (PROTONIX) 40 MG EC tablet 90 tablet 0     Sig: Take 1 tablet by mouth Daily.        Pharmacy where request should be sent: [facility] [facility] [place], [place] - [address] [phone] [place], [place] [id number] [place]     Last office visit with prescribing clinician: 7/18/2024   Last telemedicine visit with prescribing clinician: Visit date not found   Next office visit with prescribing clinician: 8/22/2024     Does the patient have less than a 3 day supply:  [x] Yes  [] No    Vlad Casiano Rep   08/21/24 16:59 EDT

## 2024-08-22 RX ORDER — PANTOPRAZOLE SODIUM 40 MG/1
40 TABLET, DELAYED RELEASE ORAL DAILY
Qty: 90 TABLET | Refills: 0 | Status: SHIPPED | OUTPATIENT
Start: 2024-08-22

## 2024-09-17 ENCOUNTER — TELEPHONE (OUTPATIENT)
Dept: FAMILY MEDICINE CLINIC | Age: 79
End: 2024-09-17
Payer: MEDICARE

## 2024-09-18 DIAGNOSIS — R97.20 BPH WITH ELEVATED PSA: ICD-10-CM

## 2024-09-18 DIAGNOSIS — N40.0 BPH WITH ELEVATED PSA: ICD-10-CM

## 2024-09-18 DIAGNOSIS — N40.0 ENLARGED PROSTATE: Primary | ICD-10-CM

## 2024-09-18 DIAGNOSIS — R33.9 URINARY RETENTION: ICD-10-CM

## 2024-09-19 ENCOUNTER — HOSPITAL ENCOUNTER (INPATIENT)
Facility: HOSPITAL | Age: 79
LOS: 4 days | Discharge: HOME | End: 2024-09-23
Attending: EMERGENCY MEDICINE | Admitting: FAMILY MEDICINE
Payer: COMMERCIAL

## 2024-09-19 DIAGNOSIS — R31.0 GROSS HEMATURIA: ICD-10-CM

## 2024-09-19 DIAGNOSIS — R26.2 DIFFICULTY IN WALKING: ICD-10-CM

## 2024-09-19 DIAGNOSIS — R33.8 ACUTE URINARY RETENTION: Primary | ICD-10-CM

## 2024-09-19 PROBLEM — R31.9 HEMATURIA: Status: ACTIVE | Noted: 2024-09-19

## 2024-09-19 LAB
ALBUMIN SERPL-MCNC: 3.5 G/DL (ref 3.5–5.2)
ALBUMIN/GLOB SERPL: 1.1 G/DL
ALP SERPL-CCNC: 103 U/L (ref 39–117)
ALT SERPL W P-5'-P-CCNC: 12 U/L (ref 1–41)
ANION GAP SERPL CALCULATED.3IONS-SCNC: 11.4 MMOL/L (ref 5–15)
AST SERPL-CCNC: 25 U/L (ref 1–40)
BASOPHILS # BLD AUTO: 0.02 10*3/MM3 (ref 0–0.2)
BASOPHILS NFR BLD AUTO: 0.2 % (ref 0–1.5)
BILIRUB SERPL-MCNC: 0.5 MG/DL (ref 0–1.2)
BUN SERPL-MCNC: 19 MG/DL (ref 8–23)
BUN/CREAT SERPL: 9.8 (ref 7–25)
CALCIUM SPEC-SCNC: 8.5 MG/DL (ref 8.6–10.5)
CHLORIDE SERPL-SCNC: 99 MMOL/L (ref 98–107)
CO2 SERPL-SCNC: 23.6 MMOL/L (ref 22–29)
CREAT SERPL-MCNC: 1.93 MG/DL (ref 0.76–1.27)
DEPRECATED RDW RBC AUTO: 42.5 FL (ref 37–54)
EGFRCR SERPLBLD CKD-EPI 2021: 35 ML/MIN/1.73
EOSINOPHIL # BLD AUTO: 0.11 10*3/MM3 (ref 0–0.4)
EOSINOPHIL NFR BLD AUTO: 1.2 % (ref 0.3–6.2)
ERYTHROCYTE [DISTWIDTH] IN BLOOD BY AUTOMATED COUNT: 13.4 % (ref 12.3–15.4)
GLOBULIN UR ELPH-MCNC: 3.2 GM/DL
GLUCOSE SERPL-MCNC: 127 MG/DL (ref 65–99)
HCT VFR BLD AUTO: 37.4 % (ref 37.5–51)
HGB BLD-MCNC: 12.5 G/DL (ref 13–17.7)
HOLD SPECIMEN: NORMAL
HOLD SPECIMEN: NORMAL
IMM GRANULOCYTES # BLD AUTO: 0.03 10*3/MM3 (ref 0–0.05)
IMM GRANULOCYTES NFR BLD AUTO: 0.3 % (ref 0–0.5)
LIPASE SERPL-CCNC: 37 U/L (ref 13–60)
LYMPHOCYTES # BLD AUTO: 0.67 10*3/MM3 (ref 0.7–3.1)
LYMPHOCYTES NFR BLD AUTO: 7.5 % (ref 19.6–45.3)
MCH RBC QN AUTO: 29 PG (ref 26.6–33)
MCHC RBC AUTO-ENTMCNC: 33.4 G/DL (ref 31.5–35.7)
MCV RBC AUTO: 86.8 FL (ref 79–97)
MONOCYTES # BLD AUTO: 0.56 10*3/MM3 (ref 0.1–0.9)
MONOCYTES NFR BLD AUTO: 6.3 % (ref 5–12)
NEUTROPHILS NFR BLD AUTO: 7.53 10*3/MM3 (ref 1.7–7)
NEUTROPHILS NFR BLD AUTO: 84.5 % (ref 42.7–76)
NRBC BLD AUTO-RTO: 0 /100 WBC (ref 0–0.2)
PLATELET # BLD AUTO: 255 10*3/MM3 (ref 140–450)
PMV BLD AUTO: 10.3 FL (ref 6–12)
POTASSIUM SERPL-SCNC: 4.1 MMOL/L (ref 3.5–5.2)
PROT SERPL-MCNC: 6.7 G/DL (ref 6–8.5)
RBC # BLD AUTO: 4.31 10*6/MM3 (ref 4.14–5.8)
SODIUM SERPL-SCNC: 134 MMOL/L (ref 136–145)
TROPONIN T SERPL HS-MCNC: 28 NG/L
WBC NRBC COR # BLD AUTO: 8.92 10*3/MM3 (ref 3.4–10.8)
WHOLE BLOOD HOLD COAG: NORMAL
WHOLE BLOOD HOLD SPECIMEN: NORMAL

## 2024-09-19 PROCEDURE — 85025 COMPLETE CBC W/AUTO DIFF WBC: CPT | Performed by: EMERGENCY MEDICINE

## 2024-09-19 PROCEDURE — 51702 INSERT TEMP BLADDER CATH: CPT

## 2024-09-19 PROCEDURE — 99285 EMERGENCY DEPT VISIT HI MDM: CPT

## 2024-09-19 PROCEDURE — 25010000002 MORPHINE PER 10 MG: Performed by: EMERGENCY MEDICINE

## 2024-09-19 PROCEDURE — 51798 US URINE CAPACITY MEASURE: CPT

## 2024-09-19 PROCEDURE — 83690 ASSAY OF LIPASE: CPT | Performed by: EMERGENCY MEDICINE

## 2024-09-19 PROCEDURE — 99223 1ST HOSP IP/OBS HIGH 75: CPT | Performed by: FAMILY MEDICINE

## 2024-09-19 PROCEDURE — 0T9B70Z DRAINAGE OF BLADDER WITH DRAINAGE DEVICE, VIA NATURAL OR ARTIFICIAL OPENING: ICD-10-PCS | Performed by: EMERGENCY MEDICINE

## 2024-09-19 PROCEDURE — 80053 COMPREHEN METABOLIC PANEL: CPT | Performed by: EMERGENCY MEDICINE

## 2024-09-19 PROCEDURE — 84484 ASSAY OF TROPONIN QUANT: CPT | Performed by: EMERGENCY MEDICINE

## 2024-09-19 PROCEDURE — 36415 COLL VENOUS BLD VENIPUNCTURE: CPT

## 2024-09-19 RX ORDER — MORPHINE SULFATE 2 MG/ML
2 INJECTION, SOLUTION INTRAMUSCULAR; INTRAVENOUS ONCE
Status: COMPLETED | OUTPATIENT
Start: 2024-09-19 | End: 2024-09-19

## 2024-09-19 RX ORDER — SODIUM CHLORIDE 0.9 % (FLUSH) 0.9 %
10 SYRINGE (ML) INJECTION AS NEEDED
Status: DISCONTINUED | OUTPATIENT
Start: 2024-09-19 | End: 2024-09-23 | Stop reason: HOSPADM

## 2024-09-19 RX ADMIN — MORPHINE SULFATE 2 MG: 2 INJECTION, SOLUTION INTRAMUSCULAR; INTRAVENOUS at 20:25
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
## 2024-09-20 PROBLEM — N39.0 UTI (URINARY TRACT INFECTION), BACTERIAL: Status: ACTIVE | Noted: 2024-09-20

## 2024-09-20 PROBLEM — E44.0 MODERATE MALNUTRITION: Status: ACTIVE | Noted: 2024-09-20

## 2024-09-20 PROBLEM — R33.8 ACUTE URINARY RETENTION: Status: ACTIVE | Noted: 2024-09-20

## 2024-09-20 PROBLEM — A49.9 UTI (URINARY TRACT INFECTION), BACTERIAL: Status: ACTIVE | Noted: 2024-09-20

## 2024-09-20 PROBLEM — R31.0 GROSS HEMATURIA: Status: ACTIVE | Noted: 2024-09-20

## 2024-09-20 LAB
ANION GAP SERPL CALCULATED.3IONS-SCNC: 10.4 MMOL/L (ref 5–15)
BUN SERPL-MCNC: 18 MG/DL (ref 8–23)
BUN/CREAT SERPL: 11.1 (ref 7–25)
CALCIUM SPEC-SCNC: 8.4 MG/DL (ref 8.6–10.5)
CHLORIDE SERPL-SCNC: 101 MMOL/L (ref 98–107)
CO2 SERPL-SCNC: 24.6 MMOL/L (ref 22–29)
CREAT SERPL-MCNC: 1.62 MG/DL (ref 0.76–1.27)
DEPRECATED RDW RBC AUTO: 44.2 FL (ref 37–54)
EGFRCR SERPLBLD CKD-EPI 2021: 43.2 ML/MIN/1.73
ERYTHROCYTE [DISTWIDTH] IN BLOOD BY AUTOMATED COUNT: 13.7 % (ref 12.3–15.4)
GLUCOSE SERPL-MCNC: 132 MG/DL (ref 65–99)
HCT VFR BLD AUTO: 37.1 % (ref 37.5–51)
HGB BLD-MCNC: 12.1 G/DL (ref 13–17.7)
MCH RBC QN AUTO: 28.9 PG (ref 26.6–33)
MCHC RBC AUTO-ENTMCNC: 32.6 G/DL (ref 31.5–35.7)
MCV RBC AUTO: 88.5 FL (ref 79–97)
PLATELET # BLD AUTO: 231 10*3/MM3 (ref 140–450)
PMV BLD AUTO: 10.9 FL (ref 6–12)
POTASSIUM SERPL-SCNC: 3.5 MMOL/L (ref 3.5–5.2)
RBC # BLD AUTO: 4.19 10*6/MM3 (ref 4.14–5.8)
SODIUM SERPL-SCNC: 136 MMOL/L (ref 136–145)
WBC NRBC COR # BLD AUTO: 6.25 10*3/MM3 (ref 3.4–10.8)

## 2024-09-20 PROCEDURE — 25810000003 LACTATED RINGERS PER 1000 ML: Performed by: FAMILY MEDICINE

## 2024-09-20 PROCEDURE — 97161 PT EVAL LOW COMPLEX 20 MIN: CPT

## 2024-09-20 PROCEDURE — 25010000002 CEFTRIAXONE PER 250 MG: Performed by: UROLOGY

## 2024-09-20 PROCEDURE — 97165 OT EVAL LOW COMPLEX 30 MIN: CPT

## 2024-09-20 PROCEDURE — 85027 COMPLETE CBC AUTOMATED: CPT | Performed by: FAMILY MEDICINE

## 2024-09-20 PROCEDURE — 80048 BASIC METABOLIC PNL TOTAL CA: CPT | Performed by: FAMILY MEDICINE

## 2024-09-20 PROCEDURE — 36415 COLL VENOUS BLD VENIPUNCTURE: CPT | Performed by: FAMILY MEDICINE

## 2024-09-20 PROCEDURE — 99232 SBSQ HOSP IP/OBS MODERATE 35: CPT | Performed by: INTERNAL MEDICINE

## 2024-09-20 PROCEDURE — 99222 1ST HOSP IP/OBS MODERATE 55: CPT | Performed by: UROLOGY

## 2024-09-20 RX ORDER — AMOXICILLIN 250 MG
2 CAPSULE ORAL 2 TIMES DAILY PRN
Status: DISCONTINUED | OUTPATIENT
Start: 2024-09-20 | End: 2024-09-23 | Stop reason: HOSPADM

## 2024-09-20 RX ORDER — SODIUM CHLORIDE 0.9 % (FLUSH) 0.9 %
10 SYRINGE (ML) INJECTION AS NEEDED
Status: DISCONTINUED | OUTPATIENT
Start: 2024-09-20 | End: 2024-09-23 | Stop reason: HOSPADM

## 2024-09-20 RX ORDER — TAMSULOSIN HYDROCHLORIDE 0.4 MG/1
0.4 CAPSULE ORAL DAILY
Status: DISCONTINUED | OUTPATIENT
Start: 2024-09-20 | End: 2024-09-23 | Stop reason: HOSPADM

## 2024-09-20 RX ORDER — SODIUM CHLORIDE 0.9 % (FLUSH) 0.9 %
10 SYRINGE (ML) INJECTION EVERY 12 HOURS SCHEDULED
Status: DISCONTINUED | OUTPATIENT
Start: 2024-09-20 | End: 2024-09-23 | Stop reason: HOSPADM

## 2024-09-20 RX ORDER — BISACODYL 5 MG/1
5 TABLET, DELAYED RELEASE ORAL DAILY PRN
Status: DISCONTINUED | OUTPATIENT
Start: 2024-09-20 | End: 2024-09-23 | Stop reason: HOSPADM

## 2024-09-20 RX ORDER — SODIUM CHLORIDE 9 MG/ML
40 INJECTION, SOLUTION INTRAVENOUS AS NEEDED
Status: DISCONTINUED | OUTPATIENT
Start: 2024-09-20 | End: 2024-09-23 | Stop reason: HOSPADM

## 2024-09-20 RX ORDER — POLYETHYLENE GLYCOL 3350 17 G/17G
17 POWDER, FOR SOLUTION ORAL DAILY PRN
Status: DISCONTINUED | OUTPATIENT
Start: 2024-09-20 | End: 2024-09-23 | Stop reason: HOSPADM

## 2024-09-20 RX ORDER — METOPROLOL SUCCINATE 50 MG/1
50 TABLET, EXTENDED RELEASE ORAL DAILY
Status: DISCONTINUED | OUTPATIENT
Start: 2024-09-20 | End: 2024-09-23 | Stop reason: HOSPADM

## 2024-09-20 RX ORDER — SODIUM CHLORIDE, SODIUM LACTATE, POTASSIUM CHLORIDE, CALCIUM CHLORIDE 600; 310; 30; 20 MG/100ML; MG/100ML; MG/100ML; MG/100ML
75 INJECTION, SOLUTION INTRAVENOUS CONTINUOUS
Status: ACTIVE | OUTPATIENT
Start: 2024-09-20 | End: 2024-09-20

## 2024-09-20 RX ORDER — FINASTERIDE 5 MG/1
5 TABLET, FILM COATED ORAL DAILY
Status: DISCONTINUED | OUTPATIENT
Start: 2024-09-20 | End: 2024-09-23 | Stop reason: HOSPADM

## 2024-09-20 RX ORDER — MORPHINE SULFATE 2 MG/ML
2 INJECTION, SOLUTION INTRAMUSCULAR; INTRAVENOUS EVERY 4 HOURS PRN
Status: DISCONTINUED | OUTPATIENT
Start: 2024-09-20 | End: 2024-09-20 | Stop reason: SDUPTHER

## 2024-09-20 RX ORDER — PANTOPRAZOLE SODIUM 40 MG/1
40 TABLET, DELAYED RELEASE ORAL EVERY MORNING
Status: DISCONTINUED | OUTPATIENT
Start: 2024-09-20 | End: 2024-09-23 | Stop reason: HOSPADM

## 2024-09-20 RX ORDER — PANTOPRAZOLE SODIUM 40 MG/10ML
40 INJECTION, POWDER, LYOPHILIZED, FOR SOLUTION INTRAVENOUS
Status: DISCONTINUED | OUTPATIENT
Start: 2024-09-20 | End: 2024-09-20

## 2024-09-20 RX ORDER — HYDRALAZINE HYDROCHLORIDE 20 MG/ML
10 INJECTION INTRAMUSCULAR; INTRAVENOUS EVERY 6 HOURS PRN
Status: DISCONTINUED | OUTPATIENT
Start: 2024-09-20 | End: 2024-09-20

## 2024-09-20 RX ORDER — LOSARTAN POTASSIUM 50 MG/1
50 TABLET ORAL 2 TIMES DAILY
Status: DISCONTINUED | OUTPATIENT
Start: 2024-09-20 | End: 2024-09-23 | Stop reason: HOSPADM

## 2024-09-20 RX ORDER — BISACODYL 10 MG
10 SUPPOSITORY, RECTAL RECTAL DAILY PRN
Status: DISCONTINUED | OUTPATIENT
Start: 2024-09-20 | End: 2024-09-23 | Stop reason: HOSPADM

## 2024-09-20 RX ORDER — FERROUS SULFATE 325(65) MG
325 TABLET ORAL
Status: DISCONTINUED | OUTPATIENT
Start: 2024-09-20 | End: 2024-09-23 | Stop reason: HOSPADM

## 2024-09-20 RX ORDER — MEMANTINE HYDROCHLORIDE 5 MG/1
5 TABLET ORAL 2 TIMES DAILY
Status: DISCONTINUED | OUTPATIENT
Start: 2024-09-20 | End: 2024-09-23 | Stop reason: HOSPADM

## 2024-09-20 RX ORDER — ONDANSETRON 2 MG/ML
4 INJECTION INTRAMUSCULAR; INTRAVENOUS EVERY 6 HOURS PRN
Status: DISCONTINUED | OUTPATIENT
Start: 2024-09-20 | End: 2024-09-23 | Stop reason: HOSPADM

## 2024-09-20 RX ORDER — MORPHINE SULFATE 2 MG/ML
1 INJECTION, SOLUTION INTRAMUSCULAR; INTRAVENOUS EVERY 4 HOURS PRN
Status: DISCONTINUED | OUTPATIENT
Start: 2024-09-20 | End: 2024-09-20

## 2024-09-20 RX ORDER — ACETAMINOPHEN 325 MG/1
650 TABLET ORAL EVERY 6 HOURS PRN
Status: DISCONTINUED | OUTPATIENT
Start: 2024-09-20 | End: 2024-09-23 | Stop reason: HOSPADM

## 2024-09-20 RX ADMIN — Medication 10 ML: at 08:28

## 2024-09-20 RX ADMIN — FINASTERIDE 5 MG: 5 TABLET, FILM COATED ORAL at 08:23

## 2024-09-20 RX ADMIN — LEVOTHYROXINE SODIUM 137 MCG: 137 TABLET ORAL at 08:14

## 2024-09-20 RX ADMIN — SODIUM CHLORIDE, POTASSIUM CHLORIDE, SODIUM LACTATE AND CALCIUM CHLORIDE 75 ML/HR: 600; 310; 30; 20 INJECTION, SOLUTION INTRAVENOUS at 04:13

## 2024-09-20 RX ADMIN — PANTOPRAZOLE SODIUM 40 MG: 40 INJECTION, POWDER, FOR SOLUTION INTRAVENOUS at 05:18

## 2024-09-20 RX ADMIN — CEFTRIAXONE SODIUM 1000 MG: 1 INJECTION, POWDER, FOR SOLUTION INTRAMUSCULAR; INTRAVENOUS at 05:18

## 2024-09-20 RX ADMIN — METOPROLOL SUCCINATE 50 MG: 50 TABLET, EXTENDED RELEASE ORAL at 08:23

## 2024-09-20 RX ADMIN — TAMSULOSIN HYDROCHLORIDE 0.4 MG: 0.4 CAPSULE ORAL at 08:24

## 2024-09-20 RX ADMIN — Medication 10 ML: at 21:17

## 2024-09-20 RX ADMIN — MEMANTINE 5 MG: 10 TABLET ORAL at 08:23

## 2024-09-20 RX ADMIN — LOSARTAN POTASSIUM 50 MG: 50 TABLET ORAL at 21:16

## 2024-09-20 RX ADMIN — LOSARTAN POTASSIUM 50 MG: 50 TABLET ORAL at 08:23

## 2024-09-20 RX ADMIN — MEMANTINE 5 MG: 10 TABLET ORAL at 21:16

## 2024-09-20 RX ADMIN — PANTOPRAZOLE SODIUM 40 MG: 40 TABLET, DELAYED RELEASE ORAL at 08:14

## 2024-09-20 RX ADMIN — FERROUS SULFATE TAB 325 MG (65 MG ELEMENTAL FE) 325 MG: 325 (65 FE) TAB at 08:23
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
## 2024-09-20 NOTE — PLAN OF CARE
Goal Outcome Evaluation:              Outcome Evaluation: Pt presents with deficits in L LE strength as well as overall balance, mobility, endurance, and activity tolerance. He would benefit from skilled PT intervention to address noted deficits. Recommend d/c to IPR      Anticipated Discharge Disposition (PT): inpatient rehabilitation facility

## 2024-09-20 NOTE — PLAN OF CARE
Goal Outcome Evaluation:  Plan of Care Reviewed With: patient        Progress: no change  Outcome Evaluation: Pt is alert and oriented. Pt tolerating CBI treatment well. Pt is a standby assist. Pt has had no complaints of pain. Safety checks maintained throughout shift. Call light and personal possessions within reach.

## 2024-09-20 NOTE — PROGRESS NOTES
[facility]   Urology Progress Note    Patient Name: Dixon Amador  : 1945  MRN: [id number]  Primary Care Physician:  Shayy Galaviz APRN  Date of admission: 2024    Subjective   Subjective           Objective   Objective     Vitals:   Temp:  [98.1 °F (36.7 °C)-98.4 °F (36.9 °C)] 98.1 °F (36.7 °C)  Heart Rate:  [62-79] 71  Resp:  [16-18] 16  BP: (164-220)/() 164/78  Physical Exam     Alert and oriented x3  No acute distress  Unlabored respirations  Nontender/nondistended       Result Review    Result Review:  I have personally reviewed the results from the time of this admission to 2024 06:12 EDT and agree with these findings:  []  Laboratory  []  Microbiology  []  Radiology  []  EKG/Telemetry   []  Cardiology/Vascular   []  Pathology  []  Old records  []  Other:      Assessment & Plan   Assessment / Plan     Brief Patient Summary:  Dixon Amador is a 78 y.o. male    Active Hospital Problems:  Active Hospital Problems    Diagnosis     **Gross hematuria     Acute urinary retention     UTI (urinary tract infection), bacterial     History of non-ST elevation myocardial infarction (NSTEMI)     Essential (primary) hypertension     History of basal cell cancer     History of esophageal cancer          Urinary retention  Gross hematuria  BPH        On CBI running clear currently    Continue CBI, wean as tolerated   Manually irrigate every 4 hours and as needed     Start Flomax 0.4 mg and finasteride.  Orders placed          Hold anticoagulation until urine clears     Will follow    Electronically signed by Miguel Angel Oviedo MD, 24, 6:12 AM EDT.

## 2024-09-20 NOTE — PLAN OF CARE
Goal Outcome Evaluation:  Plan of Care Reviewed With: patient, spouse        Progress: improving  Outcome Evaluation: ED admission this shift for urinary retention with gross hematuria. PT is AAOx4, slightly [place], VSS, BP elevated (see flowsheet), no c/o of pain once arrived to floor, patient reports ABD pain resolved once bladder was emptied in ED. Patient arrived on CBI with pink tinged urine, 3 way FC was inserted in ED,  urology irrigated in ED, orders to irrigate q4h and PRN, IVF of LR @ 75ml/hr, tolerated IV Rocephine, holding blood thinners per order. No c/o of SOA, N/V/D, bed in low/locked position, alarms audible, call light and personal items within reach, continue with current POC at this time.

## 2024-09-20 NOTE — ED NOTES
Hand flushed galvan catheter with approx. 4000ml NS, 60ml at a time. In this room for over an hour. Clots pulled from catheter after flush. Kept flushing until clear and no clots seen. Physician updated. Pt states he feels much better, no pain. Pt cleaned up and new brief applied. Catheter bag changed.

## 2024-09-20 NOTE — ED NOTES
Noted dried blood speckled behind head on sheets. Pt states he has been falling. Fell 2 days ago. Physician notified. Physician knew and full workup at time of fall was done 2 days ago. Pt denies any head or neck pain at this time.     3 way catheter inserted, irrigation fluids running.
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
## 2024-09-20 NOTE — PROGRESS NOTES
[facility]   Hospitalist Progress Note  Date: 2024  Patient Name: Dixon Amador  : 1945  MRN: [id number]  Date of admission: 2024      Subjective   Subjective     Chief Complaint: Follow up for hematuria    Summary: 77 y/o M with HTN, Afib on Eliquis, CAD, carotid artery stenosis (R side 81%)  on Brilinta, hypothyroidism who presented to outside hospital ER with hematuria, diagnosed with UTI, had urinary retention, requiring straight cath, discharged on Keflex (started ).  Came to our ER with hematuria.  Plavix/Eliquis held.  Three-way catheter placed, on CBI, Urology following    Interval Followup:   Doing better.  Hematuria appears to have resolved.  Denies suprapubic pain.  No fever or chills.  No acute complaint    Objective   Objective     Vitals:   Temp:  [98.1 °F (36.7 °C)-98.4 °F (36.9 °C)] 98.1 °F (36.7 °C)  Heart Rate:  [62-79] 71  Resp:  [16-18] 16  BP: (164-220)/() 164/78  Physical Exam    Constitutional: conversant, NAD   Respiratory:  nonlabored respirations    Cardiovascular:  RRR, no edema   Gastrointestinal: soft, nondistended   Neurologic: Alert, speech clear   Skin: Extremities warm   : +galvan catheter    Result Review    Result Review:  I have personally reviewed the following over the last 24 hours (07:00 to 07:00) and agree with the following findings  [x]  Laboratory  CBC          2024    11:56 2024    18:22 2024    04:08   CBC   WBC 7.82  8.92  6.25    RBC 5.38  4.31  4.19    Hemoglobin 15.7  12.5  12.1    Hematocrit 48.5  37.4  37.1    MCV 90.1  86.8  88.5    MCH 29.2  29.0  28.9    MCHC 32.4  33.4  32.6    RDW 16.0  13.4  13.7    Platelets 265  255  231      BMP          2024    11:56 2024    18:22 2024    04:08   BMP   BUN 13  19  18    Creatinine 1.23  1.93  1.62    Sodium 140  134  136    Potassium 3.9  4.1  3.5    Chloride 101  99  101    CO2 28.2  23.6  24.6    Calcium 9.2  8.5  8.4      []  Microbiology  []  Radiology    [x]  EKG/Telemetry monitor personally reviewed and independently interpreted: afib, rate controlled  []  Cardiology/Vascular   []  Pathology  []  Old records  []  Other:    Assessment & Plan   Assessment / Plan     Assessment/Plan:  Gross hematuria  BPH.  Severe prostatomegaly on CT  Urinary retention  UTI from unspecified organism  A-fib on Eliquis  CAD  Severe right ICA stenosis  Hypertension  Hypothyroidism  Iron deficiency anemia         Hematuria appears to be resolving, urology following, managing CBI, continue Almeida catheter, continue Flomax and Proscar, continue Rocephin (day 2)  Heart rate well-controlled. Restart Toprol-XL 50 mg daily.  Can discontinue telemetry  Continue to hold Plavix and Eliquis  BP elevated. Cr down to 1.62.  Baseline around 1.2-1.4. Restart losartan 50 mg daily  Hemoglobin 10.1.  Start oral iron supplementation.  Trend hemoglobin.   Restart home Synthroid  PT/OT evaluation  VTE ppx: Will start subcu heparin if okay with urology  CBC, BMP in a.m.    Discussed with Dr Oviedo    VTE Prophylaxis:  Mechanical VTE prophylaxis orders are present.        CODE STATUS:   Level Of Support Discussed With: Patient  Code Status (Patient has no pulse and is not breathing): CPR (Attempt to Resuscitate)  Medical Interventions (Patient has pulse or is breathing): Full Support    Electronically signed by Jameson Almanza DO, 09/20/24, 3:03 PM EDT.

## 2024-09-20 NOTE — PLAN OF CARE
Goal Outcome Evaluation:  Plan of Care Reviewed With: patient        Progress: no change (Evaluation)  Outcome Evaluation: Patient presents with limitations of balance, strength, and activity tolerance which impede his ability to perform ADLs/transfers as prior.  The skills of a therapist will be required to safely and effectively implement treatment plan to restore maximum level function.      Anticipated Discharge Disposition (OT): inpatient rehabilitation facility

## 2024-09-21 LAB
ANION GAP SERPL CALCULATED.3IONS-SCNC: 8.7 MMOL/L (ref 5–15)
BUN SERPL-MCNC: 14 MG/DL (ref 8–23)
BUN/CREAT SERPL: 10.6 (ref 7–25)
CALCIUM SPEC-SCNC: 8.4 MG/DL (ref 8.6–10.5)
CHLORIDE SERPL-SCNC: 103 MMOL/L (ref 98–107)
CO2 SERPL-SCNC: 26.3 MMOL/L (ref 22–29)
CREAT SERPL-MCNC: 1.32 MG/DL (ref 0.76–1.27)
DEPRECATED RDW RBC AUTO: 42.6 FL (ref 37–54)
EGFRCR SERPLBLD CKD-EPI 2021: 55.2 ML/MIN/1.73
ERYTHROCYTE [DISTWIDTH] IN BLOOD BY AUTOMATED COUNT: 13.6 % (ref 12.3–15.4)
GLUCOSE SERPL-MCNC: 108 MG/DL (ref 65–99)
HCT VFR BLD AUTO: 28.9 % (ref 37.5–51)
HGB BLD-MCNC: 9.7 G/DL (ref 13–17.7)
MCH RBC QN AUTO: 29 PG (ref 26.6–33)
MCHC RBC AUTO-ENTMCNC: 33.6 G/DL (ref 31.5–35.7)
MCV RBC AUTO: 86.3 FL (ref 79–97)
PLATELET # BLD AUTO: 228 10*3/MM3 (ref 140–450)
PMV BLD AUTO: 10.6 FL (ref 6–12)
POTASSIUM SERPL-SCNC: 3.6 MMOL/L (ref 3.5–5.2)
RBC # BLD AUTO: 3.35 10*6/MM3 (ref 4.14–5.8)
SODIUM SERPL-SCNC: 138 MMOL/L (ref 136–145)
WBC NRBC COR # BLD AUTO: 5.86 10*3/MM3 (ref 3.4–10.8)

## 2024-09-21 PROCEDURE — 80048 BASIC METABOLIC PNL TOTAL CA: CPT | Performed by: FAMILY MEDICINE

## 2024-09-21 PROCEDURE — 99231 SBSQ HOSP IP/OBS SF/LOW 25: CPT | Performed by: UROLOGY

## 2024-09-21 PROCEDURE — 85027 COMPLETE CBC AUTOMATED: CPT | Performed by: FAMILY MEDICINE

## 2024-09-21 PROCEDURE — 36415 COLL VENOUS BLD VENIPUNCTURE: CPT | Performed by: FAMILY MEDICINE

## 2024-09-21 PROCEDURE — 99232 SBSQ HOSP IP/OBS MODERATE 35: CPT | Performed by: INTERNAL MEDICINE

## 2024-09-21 PROCEDURE — 25010000002 CEFTRIAXONE PER 250 MG: Performed by: INTERNAL MEDICINE

## 2024-09-21 RX ORDER — AMLODIPINE BESYLATE 5 MG/1
5 TABLET ORAL
Status: DISCONTINUED | OUTPATIENT
Start: 2024-09-21 | End: 2024-09-23 | Stop reason: HOSPADM

## 2024-09-21 RX ADMIN — FINASTERIDE 5 MG: 5 TABLET, FILM COATED ORAL at 09:05

## 2024-09-21 RX ADMIN — LEVOTHYROXINE SODIUM 137 MCG: 137 TABLET ORAL at 09:11

## 2024-09-21 RX ADMIN — CEFTRIAXONE SODIUM 1000 MG: 1 INJECTION, POWDER, FOR SOLUTION INTRAMUSCULAR; INTRAVENOUS at 09:10

## 2024-09-21 RX ADMIN — LOSARTAN POTASSIUM 50 MG: 50 TABLET ORAL at 20:38

## 2024-09-21 RX ADMIN — AMLODIPINE BESYLATE 5 MG: 5 TABLET ORAL at 12:18

## 2024-09-21 RX ADMIN — ACETAMINOPHEN 650 MG: 325 TABLET ORAL at 14:34

## 2024-09-21 RX ADMIN — METOPROLOL SUCCINATE 50 MG: 50 TABLET, EXTENDED RELEASE ORAL at 09:11

## 2024-09-21 RX ADMIN — TAMSULOSIN HYDROCHLORIDE 0.4 MG: 0.4 CAPSULE ORAL at 09:10

## 2024-09-21 RX ADMIN — MEMANTINE 5 MG: 10 TABLET ORAL at 20:38

## 2024-09-21 RX ADMIN — Medication 10 ML: at 09:05

## 2024-09-21 RX ADMIN — PANTOPRAZOLE SODIUM 40 MG: 40 TABLET, DELAYED RELEASE ORAL at 09:11

## 2024-09-21 RX ADMIN — Medication 10 ML: at 20:39

## 2024-09-21 RX ADMIN — FERROUS SULFATE TAB 325 MG (65 MG ELEMENTAL FE) 325 MG: 325 (65 FE) TAB at 09:11

## 2024-09-21 RX ADMIN — LOSARTAN POTASSIUM 50 MG: 50 TABLET ORAL at 09:04

## 2024-09-21 RX ADMIN — MEMANTINE 5 MG: 10 TABLET ORAL at 09:11

## 2024-09-21 NOTE — PROGRESS NOTES
[facility]   Hospitalist Progress Note  Date: 2024  Patient Name: Dixon Amador  : 1945  MRN: [id number]  Date of admission: 2024      Subjective   Subjective     Chief Complaint: Follow up for hematuria    Summary: 77 y/o M with HTN, Afib on Eliquis, CAD, carotid artery stenosis (R side 81%)  on Brilinta, hypothyroidism who presented to outside hospital ER with hematuria, diagnosed with UTI, had urinary retention, requiring straight cath, discharged on Keflex (started ).  Came to our ER with hematuria.  Plavix/Eliquis held.  Three-way catheter placed, on CBI, Urology following    Interval Followup:   Blood pressure on the high side  Galvan in place.  Unfortunately having hematuria again  Denies uncontrolled pain  No acute complaints      Objective   Objective     Vitals:   Temp:  [97.3 °F (36.3 °C)-98.8 °F (37.1 °C)] 98.1 °F (36.7 °C)  Heart Rate:  [63-71] 64  Resp:  [16] 16  BP: (140-184)/(68-89) 180/79  Physical Exam    Constitutional: conversant, NAD   Respiratory:  nonlabored respirations    Cardiovascular:  RRR, no edema   Gastrointestinal: soft, nondistended   Neurologic: Alert, speech clear   Skin: Extremities warm   : +galvan catheter    Result Review    Result Review:  I have personally reviewed the following over the last 24 hours (07:00 to 07:00) and agree with the following findings  [x]  Laboratory  CBC          2024    18:22 2024    04:08 2024    04:11   CBC   WBC 8.92  6.25  5.86    RBC 4.31  4.19  3.35    Hemoglobin 12.5  12.1  9.7    Hematocrit 37.4  37.1  28.9    MCV 86.8  88.5  86.3    MCH 29.0  28.9  29.0    MCHC 33.4  32.6  33.6    RDW 13.4  13.7  13.6    Platelets 255  231  228      BMP          2024    18:22 2024    04:08 2024    04:11   BMP   BUN 19  18  14    Creatinine 1.93  1.62  1.32    Sodium 134  136  138    Potassium 4.1  3.5  3.6    Chloride 99  101  103    CO2 23.6  24.6  26.3    Calcium 8.5  8.4  8.4      []   Microbiology  []  Radiology   [x]  EKG/Telemetry monitor personally reviewed and independently interpreted: afib, rate controlled  []  Cardiology/Vascular   []  Pathology  []  Old records  [x]  Other:    Intake/Output Summary (Last 24 hours) at 9/21/2024 1047  Last data filed at 9/21/2024 0918  Gross per 24 hour   Intake 92464 ml   Output 56618 ml   Net -2700 ml         Assessment & Plan   Assessment / Plan     Assessment/Plan:  Gross hematuria  BPH.  Severe prostatomegaly on CT  Urinary retention  UTI from unspecified organism  A-fib on Eliquis  CAD  Severe right ICA stenosis status post carotid angioplasty/stenting  Hypertension  Hypothyroidism  Iron deficiency anemia         Continues to have hematuria.  Hgb down to 9.7.  Continue oral iron supplementation.  Trend hemoglobin, transfuse <7 or <8 with cardiovascular compromise  Continue to hold Plavix and aspirin  Urology following, managing CBI, continue Almeida catheter, continue Flomax and Proscar, continue Rocephin to complete 5-7 days empirically as do not have microbiology from outside hospital  HR controlled. Blood pressure remains elevated.  Add amlodipine 5 mg daily, continue home losartan, continue Toprol-XL 50 mg daily.    Continue home Synthroid  PT/OT  VTE ppx: SCDs  CBC, BMP in a.m.      VTE Prophylaxis:  Mechanical VTE prophylaxis orders are present.      CODE STATUS:   Level Of Support Discussed With: Patient  Code Status (Patient has no pulse and is not breathing): CPR (Attempt to Resuscitate)  Medical Interventions (Patient has pulse or is breathing): Full Support    Electronically signed by Jameson Almanza DO, 09/21/24, 10:51 AM EDT.

## 2024-09-21 NOTE — PLAN OF CARE
Goal Outcome Evaluation:              Outcome Evaluation: CBI and Almeida cath removed by MD this am. Pt unable to void with greater than 500mL in bladder. MD notified. New Almeida cath in place with bloody urine. Pt alert and oriented and tylenol given this shift for complaints of mild pain.

## 2024-09-21 NOTE — PROGRESS NOTES
[facility]   Urology Progress Note    Patient Name: Dixon Amador  : 1945  MRN: [id number]  Primary Care Physician:  Shayy Galaviz APRN  Date of admission: 2024    Subjective   Subjective     No hematuria overnight, CBI off      Objective   Objective     Vitals:   Temp:  [97.3 °F (36.3 °C)-98.8 °F (37.1 °C)] 98.6 °F (37 °C)  Heart Rate:  [63-71] 71  Resp:  [16] 16  BP: (140-184)/(68-89) 144/68  Physical Exam     Alert and oriented x3  No acute distress  Unlabored respirations  Nontender/nondistended  Catheter in place, clear       Result Review    Result Review:  I have personally reviewed the results from the time of this admission to 2024 08:10 EDT and agree with these findings:  [x]  Laboratory  []  Microbiology  []  Radiology  []  EKG/Telemetry   []  Cardiology/Vascular   []  Pathology  []  Old records  []  Other:      Assessment & Plan   Assessment / Plan     Brief Patient Summary:  Dixon Amador is a 78 y.o. male history of stroke on Brilinta and Eliquis who presented with gross hematuria, inability to urinate    Active Hospital Problems:  Active Hospital Problems    Diagnosis     **Gross hematuria     Acute urinary retention     UTI (urinary tract infection), bacterial     Moderate malnutrition     History of non-ST elevation myocardial infarction (NSTEMI)     Essential (primary) hypertension     History of basal cell cancer     History of esophageal cancer          Urinary retention  Gross hematuria  BPH     CBI off, clear    Void trial today, catheter removed  Obtain postvoid bladder scans to ensure adequate emptying    Continue Flomax 0.4 mg and finasteride.      Hold anticoagulation until urine mayi clear after void trial     Will follow    Electronically signed by Jenniffer San MD, 24, 8:10 AM EDT.

## 2024-09-22 LAB
ANION GAP SERPL CALCULATED.3IONS-SCNC: 8.9 MMOL/L (ref 5–15)
BUN SERPL-MCNC: 15 MG/DL (ref 8–23)
BUN/CREAT SERPL: 13.4 (ref 7–25)
CALCIUM SPEC-SCNC: 8.3 MG/DL (ref 8.6–10.5)
CHLORIDE SERPL-SCNC: 101 MMOL/L (ref 98–107)
CO2 SERPL-SCNC: 27.1 MMOL/L (ref 22–29)
CREAT SERPL-MCNC: 1.12 MG/DL (ref 0.76–1.27)
DEPRECATED RDW RBC AUTO: 43.4 FL (ref 37–54)
EGFRCR SERPLBLD CKD-EPI 2021: 67.2 ML/MIN/1.73
ERYTHROCYTE [DISTWIDTH] IN BLOOD BY AUTOMATED COUNT: 13.9 % (ref 12.3–15.4)
GLUCOSE SERPL-MCNC: 93 MG/DL (ref 65–99)
HCT VFR BLD AUTO: 30.2 % (ref 37.5–51)
HGB BLD-MCNC: 10.2 G/DL (ref 13–17.7)
MCH RBC QN AUTO: 29 PG (ref 26.6–33)
MCHC RBC AUTO-ENTMCNC: 33.8 G/DL (ref 31.5–35.7)
MCV RBC AUTO: 85.8 FL (ref 79–97)
PLATELET # BLD AUTO: 243 10*3/MM3 (ref 140–450)
PMV BLD AUTO: 10.3 FL (ref 6–12)
POTASSIUM SERPL-SCNC: 3.6 MMOL/L (ref 3.5–5.2)
RBC # BLD AUTO: 3.52 10*6/MM3 (ref 4.14–5.8)
SODIUM SERPL-SCNC: 137 MMOL/L (ref 136–145)
WBC NRBC COR # BLD AUTO: 3.89 10*3/MM3 (ref 3.4–10.8)

## 2024-09-22 PROCEDURE — 25010000002 HEPARIN (PORCINE) PER 1000 UNITS: Performed by: INTERNAL MEDICINE

## 2024-09-22 PROCEDURE — 25010000002 CEFTRIAXONE PER 250 MG: Performed by: INTERNAL MEDICINE

## 2024-09-22 PROCEDURE — 99232 SBSQ HOSP IP/OBS MODERATE 35: CPT | Performed by: INTERNAL MEDICINE

## 2024-09-22 PROCEDURE — 99231 SBSQ HOSP IP/OBS SF/LOW 25: CPT | Performed by: UROLOGY

## 2024-09-22 PROCEDURE — 80048 BASIC METABOLIC PNL TOTAL CA: CPT | Performed by: FAMILY MEDICINE

## 2024-09-22 PROCEDURE — 85027 COMPLETE CBC AUTOMATED: CPT | Performed by: FAMILY MEDICINE

## 2024-09-22 RX ORDER — HYDROXYZINE HYDROCHLORIDE 25 MG/1
50 TABLET, FILM COATED ORAL NIGHTLY PRN
Status: DISCONTINUED | OUTPATIENT
Start: 2024-09-22 | End: 2024-09-23 | Stop reason: HOSPADM

## 2024-09-22 RX ORDER — HYDROCODONE BITARTRATE AND ACETAMINOPHEN 7.5; 325 MG/1; MG/1
1 TABLET ORAL EVERY 6 HOURS PRN
Status: DISCONTINUED | OUTPATIENT
Start: 2024-09-22 | End: 2024-09-23 | Stop reason: HOSPADM

## 2024-09-22 RX ORDER — HEPARIN SODIUM 5000 [USP'U]/ML
5000 INJECTION, SOLUTION INTRAVENOUS; SUBCUTANEOUS EVERY 12 HOURS SCHEDULED
Status: DISCONTINUED | OUTPATIENT
Start: 2024-09-22 | End: 2024-09-23

## 2024-09-22 RX ADMIN — LOSARTAN POTASSIUM 50 MG: 50 TABLET ORAL at 19:54

## 2024-09-22 RX ADMIN — ACETAMINOPHEN 650 MG: 325 TABLET ORAL at 19:55

## 2024-09-22 RX ADMIN — LEVOTHYROXINE SODIUM 137 MCG: 137 TABLET ORAL at 06:06

## 2024-09-22 RX ADMIN — AMLODIPINE BESYLATE 5 MG: 5 TABLET ORAL at 09:02

## 2024-09-22 RX ADMIN — METOPROLOL SUCCINATE 50 MG: 50 TABLET, EXTENDED RELEASE ORAL at 09:02

## 2024-09-22 RX ADMIN — HYDROXYZINE HYDROCHLORIDE 50 MG: 25 TABLET, FILM COATED ORAL at 21:17

## 2024-09-22 RX ADMIN — HEPARIN SODIUM 5000 UNITS: 5000 INJECTION INTRAVENOUS; SUBCUTANEOUS at 13:13

## 2024-09-22 RX ADMIN — CEFTRIAXONE SODIUM 1000 MG: 1 INJECTION, POWDER, FOR SOLUTION INTRAMUSCULAR; INTRAVENOUS at 09:02

## 2024-09-22 RX ADMIN — TICAGRELOR 90 MG: 90 TABLET ORAL at 21:17

## 2024-09-22 RX ADMIN — HYDROCODONE BITARTRATE AND ACETAMINOPHEN 1 TABLET: 7.5; 325 TABLET ORAL at 22:28

## 2024-09-22 RX ADMIN — PANTOPRAZOLE SODIUM 40 MG: 40 TABLET, DELAYED RELEASE ORAL at 06:06

## 2024-09-22 RX ADMIN — Medication 10 ML: at 09:02

## 2024-09-22 RX ADMIN — MEMANTINE 5 MG: 10 TABLET ORAL at 19:55

## 2024-09-22 RX ADMIN — MEMANTINE 5 MG: 10 TABLET ORAL at 09:02

## 2024-09-22 RX ADMIN — HEPARIN SODIUM 5000 UNITS: 5000 INJECTION INTRAVENOUS; SUBCUTANEOUS at 21:17

## 2024-09-22 RX ADMIN — TAMSULOSIN HYDROCHLORIDE 0.4 MG: 0.4 CAPSULE ORAL at 09:02

## 2024-09-22 RX ADMIN — LOSARTAN POTASSIUM 50 MG: 50 TABLET ORAL at 09:02

## 2024-09-22 RX ADMIN — FERROUS SULFATE TAB 325 MG (65 MG ELEMENTAL FE) 325 MG: 325 (65 FE) TAB at 09:02

## 2024-09-22 RX ADMIN — FINASTERIDE 5 MG: 5 TABLET, FILM COATED ORAL at 09:02

## 2024-09-22 RX ADMIN — TICAGRELOR 90 MG: 90 TABLET ORAL at 13:13

## 2024-09-22 NOTE — PROGRESS NOTES
[facility]   Hospitalist Progress Note  Date: 2024  Patient Name: Dixon Amador  : 1945  MRN: [id number]  Date of admission: 2024      Subjective   Subjective     Chief Complaint: Follow up for hematuria    Summary: 79 y/o M with HTN, Afib on Eliquis, CAD, carotid artery stenosis (R side 81%)  on Brilinta, hypothyroidism who presented to outside hospital ER with hematuria, diagnosed with UTI, had urinary retention, requiring straight cath, discharged on Keflex (started ).  Came to our ER with hematuria.  Plavix/Eliquis held.  Three-way catheter placed, on CBI, Urology following    Interval Followup:   Yesterday, failed void trial; catheter replaced   Hematuria resolved  BP trend improved  Denies pain, no complaints    Objective   Objective     Vitals:   Temp:  [97.9 °F (36.6 °C)-98.6 °F (37 °C)] 97.9 °F (36.6 °C)  Heart Rate:  [65-71] 68  Resp:  [18] 18  BP: (130-176)/(62-85) 176/76  Physical Exam    Constitutional: conversant, NAD   Respiratory:  nonlabored respirations    Cardiovascular:  RRR, no edema   Gastrointestinal: soft, nondistended   Neurologic: Alert, speech clear   Skin: Extremities warm   : +galvan catheter    Result Review    Result Review:  I have personally reviewed the following over the last 24 hours (07:00 to 07:00) and agree with the following findings  [x]  Laboratory  CBC          2024    04:08 2024    04:11 2024    03:30   CBC   WBC 6.25  5.86  3.89    RBC 4.19  3.35  3.52    Hemoglobin 12.1  9.7  10.2    Hematocrit 37.1  28.9  30.2    MCV 88.5  86.3  85.8    MCH 28.9  29.0  29.0    MCHC 32.6  33.6  33.8    RDW 13.7  13.6  13.9    Platelets 231  228  243      BMP          2024    04:08 2024    04:11 2024    03:30   BMP   BUN 18  14  15    Creatinine 1.62  1.32  1.12    Sodium 136  138  137    Potassium 3.5  3.6  3.6    Chloride 101  103  101    CO2 24.6  26.3  27.1    Calcium 8.4  8.4  8.3      []  Microbiology  []  Radiology   [x]   EKG/Telemetry monitor personally reviewed and independently interpreted: afib, rate controlled  []  Cardiology/Vascular   []  Pathology  []  Old records  [x]  Other:    Intake/Output Summary (Last 24 hours) at 9/22/2024 1242  Last data filed at 9/22/2024 0948  Gross per 24 hour   Intake 720 ml   Output 1225 ml   Net -505 ml         Assessment & Plan   Assessment / Plan     Assessment/Plan:  Gross hematuria/ resolved  BPH.  Severe prostatomegaly on CT  Urinary retention  UTI from unspecified organism  Paroxysmal atrial fibrillation on Eliquis  CAD with previous PCI  PVD  Severe right ICA stenosis status post carotid angioplasty/stenting  Hypertension  Hypothyroidism  Iron deficiency anemia         Appreciate urology assistance.  Discussed  plan with Dr Gonzalez  Failed voiding trial yesterday so Almeida will remain in place on discharge  Continue Flomax and Proscar  On Rocephin, received antibiotics at outside hospital on 9/18 so will stop antibiotics after today's dose  Hematuria resolved and hemoglobin improved.  Will restart Brilinta today and monitor for bleeding. Eliquis on hold.  Add subcu heparin 5000 units every 12 hours for VTE ppx  Continue oral iron supplementation.    Trend hemoglobin, transfuse <7 or <8 with cardiovascular compromise  HR controlled. Blood pressure trend improved. Continue amlodipine 5 mg daily, continue home losartan, continue Toprol-XL 50 mg daily.    Continue home Synthroid  H/H in AM    VTE Prophylaxis:  Mechanical VTE prophylaxis orders are present.      CODE STATUS:   Level Of Support Discussed With: Patient  Code Status (Patient has no pulse and is not breathing): CPR (Attempt to Resuscitate)  Medical Interventions (Patient has pulse or is breathing): Full Support    Electronically signed by Jameson Almanza DO, 09/22/24, 3:10 PM EDT.

## 2024-09-22 NOTE — PLAN OF CARE
Goal Outcome Evaluation:  Plan of Care Reviewed With: patient        Progress: improving  Outcome Evaluation: VSS, room air. alert and oriented. Galvan catheter in place with yellow urine output noted, small amounts of pink/red sediment noted in galvan bag at beginning of shift. Restarted Brilenta per orders this afternoon, along with heparin injections, monitoring for bleeding. no s/s of bleeding this shift. Will CTM.

## 2024-09-22 NOTE — PLAN OF CARE
Goal Outcome Evaluation:  Plan of Care Reviewed With: patient        Progress: improving  Outcome Evaluation: galvan with clear yellow urine now, color has improved since beginning of shift. no new compliants.Traci Tinoco RN

## 2024-09-22 NOTE — PROGRESS NOTES
[facility]   Urology Progress Note    Patient Name: Dixon Amador  : 1945  MRN: [id number]  Primary Care Physician:  Shayy Galaviz APRN  Date of admission: 2024    Subjective   Subjective     Failed void trial; catheter replaced  Patient denies pain  Family at bedside      Objective   Objective     Vitals:   Temp:  [97.9 °F (36.6 °C)-98.6 °F (37 °C)] 97.9 °F (36.6 °C)  Heart Rate:  [65-71] 68  Resp:  [18] 18  BP: (130-176)/(62-85) 176/76  Physical Exam     Alert and oriented x3  No acute distress  Unlabored respirations  Nontender/nondistended  Catheter in place, clear yellow, tiny amount of old clot in tubing       Result Review    Result Review:  I have personally reviewed the results from the time of this admission to 2024 10:30 EDT and agree with these findings:  [x]  Laboratory  []  Microbiology  []  Radiology  []  EKG/Telemetry   []  Cardiology/Vascular   []  Pathology  []  Old records  []  Other:      Assessment & Plan   Assessment / Plan     Brief Patient Summary:  Dixon Amador is a 78 y.o. male history of stroke on Brilinta and Eliquis who presented with gross hematuria, inability to urinate    Failed void trial, catheter replaced, 2024    Active Hospital Problems:  Active Hospital Problems    Diagnosis     **Gross hematuria     Acute urinary retention     UTI (urinary tract infection), bacterial     Moderate malnutrition     History of non-ST elevation myocardial infarction (NSTEMI)     Essential (primary) hypertension     History of basal cell cancer     History of esophageal cancer          Urinary retention  Gross hematuria  BPH     Maintain Almeida catheter; plan for discharge with catheter given failed void trial    Patient will warrant additional workup as outpatient    Okay to resume anticoagulation     Dr. Oviedo updated        Electronically signed by Jenniffer San MD, 24, 12:29 PM EDT.

## 2024-09-23 ENCOUNTER — TELEPHONE (OUTPATIENT)
Dept: UROLOGY | Facility: CLINIC | Age: 79
End: 2024-09-23
Payer: MEDICARE

## 2024-09-23 ENCOUNTER — READMISSION MANAGEMENT (OUTPATIENT)
Dept: CALL CENTER | Facility: HOSPITAL | Age: 79
End: 2024-09-23
Payer: MEDICARE

## 2024-09-23 VITALS
SYSTOLIC BLOOD PRESSURE: 156 MMHG | WEIGHT: 135.58 LBS | HEART RATE: 65 BPM | HEIGHT: 68 IN | RESPIRATION RATE: 18 BRPM | OXYGEN SATURATION: 94 % | TEMPERATURE: 97.9 F | BODY MASS INDEX: 20.55 KG/M2 | DIASTOLIC BLOOD PRESSURE: 72 MMHG

## 2024-09-23 DIAGNOSIS — R31.9 HEMATURIA, UNSPECIFIED TYPE: ICD-10-CM

## 2024-09-23 DIAGNOSIS — R33.8 ACUTE URINARY RETENTION: ICD-10-CM

## 2024-09-23 DIAGNOSIS — D49.4 NEOPLASM OF BLADDER: Primary | ICD-10-CM

## 2024-09-23 LAB
HCT VFR BLD AUTO: 29.4 % (ref 37.5–51)
HGB BLD-MCNC: 9.9 G/DL (ref 13–17.7)
HOLD SPECIMEN: NORMAL

## 2024-09-23 PROCEDURE — 85014 HEMATOCRIT: CPT | Performed by: INTERNAL MEDICINE

## 2024-09-23 PROCEDURE — 99232 SBSQ HOSP IP/OBS MODERATE 35: CPT | Performed by: UROLOGY

## 2024-09-23 PROCEDURE — 85018 HEMOGLOBIN: CPT | Performed by: INTERNAL MEDICINE

## 2024-09-23 PROCEDURE — 99239 HOSP IP/OBS DSCHRG MGMT >30: CPT | Performed by: INTERNAL MEDICINE

## 2024-09-23 RX ORDER — CEPHALEXIN 500 MG/1
500 CAPSULE ORAL 3 TIMES DAILY
Qty: 15 CAPSULE | Refills: 0 | Status: SHIPPED | OUTPATIENT
Start: 2024-09-23 | End: 2024-09-24 | Stop reason: SDUPTHER

## 2024-09-23 RX ORDER — FINASTERIDE 5 MG/1
5 TABLET, FILM COATED ORAL DAILY
Qty: 30 TABLET | Refills: 0 | Status: ON HOLD | OUTPATIENT
Start: 2024-09-24 | End: 2024-10-24

## 2024-09-23 RX ORDER — AMLODIPINE BESYLATE 5 MG/1
5 TABLET ORAL
Qty: 30 TABLET | Refills: 0 | Status: ON HOLD | OUTPATIENT
Start: 2024-09-24 | End: 2024-10-24

## 2024-09-23 RX ORDER — FINASTERIDE 5 MG/1
5 TABLET, FILM COATED ORAL DAILY
Qty: 30 TABLET | Refills: 0 | Status: SHIPPED | OUTPATIENT
Start: 2024-09-24 | End: 2024-09-23

## 2024-09-23 RX ORDER — AMLODIPINE BESYLATE 5 MG/1
5 TABLET ORAL
Qty: 30 TABLET | Refills: 0 | Status: SHIPPED | OUTPATIENT
Start: 2024-09-24 | End: 2024-09-23

## 2024-09-23 RX ORDER — TAMSULOSIN HYDROCHLORIDE 0.4 MG/1
0.4 CAPSULE ORAL DAILY
Qty: 30 CAPSULE | Refills: 0 | Status: ON HOLD | OUTPATIENT
Start: 2024-09-24 | End: 2024-10-24

## 2024-09-23 RX ORDER — TAMSULOSIN HYDROCHLORIDE 0.4 MG/1
0.4 CAPSULE ORAL DAILY
Qty: 30 CAPSULE | Refills: 0 | Status: SHIPPED | OUTPATIENT
Start: 2024-09-24 | End: 2024-09-23

## 2024-09-23 RX ADMIN — TICAGRELOR 90 MG: 90 TABLET ORAL at 09:23

## 2024-09-23 RX ADMIN — AMLODIPINE BESYLATE 5 MG: 5 TABLET ORAL at 09:23

## 2024-09-23 RX ADMIN — MEMANTINE 5 MG: 10 TABLET ORAL at 09:23

## 2024-09-23 RX ADMIN — LOSARTAN POTASSIUM 50 MG: 50 TABLET ORAL at 09:23

## 2024-09-23 RX ADMIN — METOPROLOL SUCCINATE 50 MG: 50 TABLET, EXTENDED RELEASE ORAL at 09:22

## 2024-09-23 RX ADMIN — Medication 10 ML: at 09:23

## 2024-09-23 RX ADMIN — APIXABAN 5 MG: 5 TABLET, FILM COATED ORAL at 09:23

## 2024-09-23 RX ADMIN — TAMSULOSIN HYDROCHLORIDE 0.4 MG: 0.4 CAPSULE ORAL at 09:23

## 2024-09-23 RX ADMIN — PANTOPRAZOLE SODIUM 40 MG: 40 TABLET, DELAYED RELEASE ORAL at 06:24

## 2024-09-23 RX ADMIN — LEVOTHYROXINE SODIUM 137 MCG: 137 TABLET ORAL at 06:24

## 2024-09-23 RX ADMIN — FERROUS SULFATE TAB 325 MG (65 MG ELEMENTAL FE) 325 MG: 325 (65 FE) TAB at 09:23

## 2024-09-23 RX ADMIN — FINASTERIDE 5 MG: 5 TABLET, FILM COATED ORAL at 09:23

## 2024-09-23 NOTE — PROGRESS NOTES
[facility]   Urology Progress Note    Patient Name: Dixon Amador  : 1945  MRN: [id number]  Primary Care Physician:  Shayy Galaviz APRN  Date of admission: 2024    Subjective   Subjective     Catheter mildly bothersome but doing okay    Failed voiding trial over the weekend    Objective   Objective     Vitals:   Temp:  [97.9 °F (36.6 °C)-98.8 °F (37.1 °C)] 98.8 °F (37.1 °C)  Heart Rate:  [61-72] 61  Resp:  [18] 18  BP: (128-176)/(62-87) 152/69  Physical Exam     Alert and oriented x3  No acute distress  Unlabored respirations  Nontender/nondistended  Catheter in place, clear urine      Result Review    Result Review:  I have personally reviewed the results from the time of this admission to 2024 06:39 EDT and agree with these findings:  [x]  Laboratory  []  Microbiology  []  Radiology  []  EKG/Telemetry   []  Cardiology/Vascular   []  Pathology  []  Old records  []  Other:      Assessment & Plan   Assessment / Plan     Brief Patient Summary:  Dixon Amador is a 78 y.o. male history of stroke on Brilinta and Eliquis who presented with gross hematuria, inability to urinate    Failed void trial, catheter replaced, 2024    Active Hospital Problems:  Active Hospital Problems    Diagnosis     **Gross hematuria     Acute urinary retention     UTI (urinary tract infection), bacterial     Moderate malnutrition     History of non-ST elevation myocardial infarction (NSTEMI)     Essential (primary) hypertension     History of basal cell cancer     History of esophageal cancer          Urinary retention  Gross hematuria  BPH     Maintain Almeida catheter; plan for discharge with catheter     With overall medical comorbidities may or may not be a candidate for BPH procedure.    Continue Flomax and finasteride    As long as he does okay on anticoagulation can be discharged.    I will make him follow-up me in a few weeks in office for cystoscopy.      Call with questions

## 2024-09-23 NOTE — PLAN OF CARE
Goal Outcome Evaluation:           Progress: no change  Outcome Evaluation: VSS, home Norco and Atarax restarted this shift per patient request, brilinta and heparin given this shift with no hematuria noted. Plan for possible DC home with galvan today. Melisa Gaston RN

## 2024-09-23 NOTE — SIGNIFICANT NOTE
09/23/24 1222   Physical Therapy Time and Intention   Session Not Performed   (Pt has orders for discharge shortly.)

## 2024-09-23 NOTE — DISCHARGE SUMMARY
[facility]         HOSPITALIST  DISCHARGE SUMMARY    Patient Name: Dixon Amador  : 1945  MRN: [id number]    Date of Admission: 2024  Date of Discharge:  24  Primary Care Physician: Shayy Galaviz APRN    Consults       Date and Time Order Name Status Description    2024 10:34 PM Hospitalist (on-call MD unless specified)      2024  9:58 PM Urology (on-call MD unless specified)              Active and Resolved Hospital Problems:  Gross hematuria  Severe prostatomegaly on CT  Urinary retention  UTI from unspecified organism  Paroxysmal atrial fibrillation on Eliquis  CAD with previous PCI  Peripheral vascular disease  Severe right ICA stenosis   Hypertension  Hypothyroidism  Iron deficiency anemia    Hospital Course     Hospital Course:  Dixon Amador is a 78 y.o. male hypertension, coronary artery disease status post PCI 2024 on Brilinta, atrial fibrillation on Eliquis, carotid artery stenosis, hypothyroidism who presented to outside hospital emergency room with hematuria.  He was diagnosed with urinary retention and UTI, required straight cath and discharged with Keflex on .  Presented to our hospital with hematuria.  Antiplatelet/anticoagulation held briefly. Urology consulted.  Three-way catheter placed with continuous bladder irrigation with resolution of hematuria.  Completed empiric course of Rocephin. Restarted on Brilinta and Eliquis. Failed voiding trial, was discharged home with Almeida catheter, Flomax and Finasteride with close urology follow-up planned for TURP consideration pending cardiology clearance.  New medications, return to hospital precautions discussed.  Discharged home in stable condition.    Day of Discharge     Vital Signs:  Temp:  [97.3 °F (36.3 °C)-98.8 °F (37.1 °C)] 97.9 °F (36.6 °C)  Heart Rate:  [58-72] 65  Resp:  [18] 18  BP: (128-173)/(62-87) 156/72  Physical Exam:   GENERAL: conversant and nontoxic.  HEART: RRR. No  edema  LUNGS: nonlabored  ABDOMEN: Soft, nondistended  NEUROLOGIC: Alert, CN intact  : +galvan catheter    Discharge Details        Discharge Medications        New Medications        Instructions Start Date   amLODIPine 5 MG tablet  Commonly known as: NORVASC   5 mg, Oral, Every 24 Hours Scheduled   Start Date: September 24, 2024     finasteride 5 MG tablet  Commonly known as: PROSCAR   5 mg, Oral, Daily   Start Date: September 24, 2024     tamsulosin 0.4 MG capsule 24 hr capsule  Commonly known as: FLOMAX   0.4 mg, Oral, Daily   Start Date: September 24, 2024            Continue These Medications        Instructions Start Date   apixaban 5 MG tablet tablet  Commonly known as: Eliquis   5 mg, Oral, 2 Times Daily      ezetimibe 10 MG tablet  Commonly known as: ZETIA   10 mg, Oral, Daily      ferrous sulfate 325 (65 FE) MG tablet  Commonly known as: FerrouSul   325 mg, Oral, Daily With Breakfast      furosemide 20 MG tablet  Commonly known as: LASIX   10 mg, Oral, Daily      HYDROcodone-acetaminophen 7.5-325 MG per tablet  Commonly known as: NORCO   1 tablet, Oral, Every 6 Hours PRN      hydrOXYzine 50 MG tablet  Commonly known as: ATARAX   50 mg, Oral, Nightly PRN      levothyroxine 137 MCG tablet  Commonly known as: SYNTHROID, LEVOTHROID   137 mcg, Oral, Daily      losartan 100 MG tablet  Commonly known as: COZAAR   50 mg, Oral, 2 Times Daily      memantine 5 MG tablet  Commonly known as: NAMENDA   5 mg, Oral, 2 Times Daily      metoprolol succinate XL 50 MG 24 hr tablet  Commonly known as: TOPROL-XL   50 mg, Oral, Daily      pantoprazole 40 MG EC tablet  Commonly known as: PROTONIX   40 mg, Oral, Daily      ticagrelor 90 MG tablet tablet  Commonly known as: Brilinta   90 mg, Oral, 2 Times Daily               Allergies   Allergen Reactions   • Atorvastatin Unknown - Low Severity   • Codeine Unknown - Low Severity     causes delium  causes delium         Discharge Disposition:  Home or Self  Care    Diet:  Hospital:  Diet Order   Procedures   • Diet: Regular/House; Texture: Soft to Chew (NDD 3); Soft to Chew: Whole Meat; Fluid Consistency: Thin (IDDSI 0)       Discharge Activity:   Activity Instructions       Activity as Tolerated              CODE STATUS:  Code Status and Medical Interventions: CPR (Attempt to Resuscitate); Full Support   Ordered at: 09/20/24 0535     Level Of Support Discussed With:    Patient     Code Status (Patient has no pulse and is not breathing):    CPR (Attempt to Resuscitate)     Medical Interventions (Patient has pulse or is breathing):    Full Support         Future Appointments   Date Time Provider Department Center   9/25/2024 11:45 AM Shayy Galaviz APRN [facility]       Additional Instructions for the Follow-ups that You Need to Schedule       Discharge Follow-up with Specified Provider: Dr Oviedo; 2 Weeks   As directed      To: Dr Oviedo   Follow Up: 2 Weeks                Pertinent  and/or Most Recent Results         LAB RESULTS:      Lab 09/23/24 0410 09/22/24 0330 09/21/24 0411 09/20/24 0408 09/19/24  1822 09/17/24  0744   WBC  --  3.89 5.86 6.25 8.92  --    HEMOGLOBIN 9.9* 10.2* 9.7* 12.1* 12.5*  --    HEMATOCRIT 29.4* 30.2* 28.9* 37.1* 37.4*  --    PLATELETS  --  243 228 231 255  --    NEUTROS ABS  --   --   --   --  7.53*  --    IMMATURE GRANS (ABS)  --   --   --   --  0.03  --    LYMPHS ABS  --   --   --   --  0.67*  --    MONOS ABS  --   --   --   --  0.56  --    EOS ABS  --   --   --   --  0.11  --    MCV  --  85.8 86.3 88.5 86.8  --    PROTIME  --   --   --   --   --  19.1*         Lab 09/22/24 0330 09/21/24 0411 09/20/24 0408 09/19/24  1822   SODIUM 137 138 136 134*   POTASSIUM 3.6 3.6 3.5 4.1   CHLORIDE 101 103 101 99   CO2 27.1 26.3 24.6 23.6   ANION GAP 8.9 8.7 10.4 11.4   BUN 15 14 18 19   CREATININE 1.12 1.32* 1.62* 1.93*   EGFR 67.2 55.2* 43.2* 35.0*   GLUCOSE 93 108* 132* 127*   CALCIUM 8.3* 8.4* 8.4* 8.5*         Lab 09/19/24  4442    TOTAL PROTEIN 6.7   ALBUMIN 3.5   GLOBULIN 3.2   ALT (SGPT) 12   AST (SGOT) 25   BILIRUBIN 0.5   ALK PHOS 103   LIPASE 37         Lab 09/19/24  1822 09/17/24  0744   HSTROP T 28*  --    PROTIME  --  19.1*   INR  --  1.54*                 Brief Urine Lab Results       None          Microbiology Results (last 10 days)       ** No results found for the last 240 hours. **            XR Chest 1 View    Result Date: 9/17/2024  Increased interstitial markings that appear unchanged. Images reviewed, interpreted, and dictated by Dr. Marcella Arriola. Transcribed by Tamra Savage PA-C.    CT Chest Without Contrast Diagnostic    Result Date: 9/17/2024  There is some anterior wedging of T4 which is age indeterminate, correlate clinically, further assessment with MRI may be of additional benefit. Prominent mediastinal lymph nodes, correlate clinically, continued follow-up recommended. Cholelithiasis, consider ultrasound if clinically warranted. Images reviewed, interpreted, and dictated by Alfredo Neal DO    CTA Neck    Result Date: 9/17/2024  The vertebral arteries are diminutive bilaterally. Motion artifact does somewhat limit assessment, there may be areas of stenosis on the right. The origin of the left vertebral artery is not well appreciated. Correlate clinically.  No large vessel occlusion in the head, diminutive appearance the left A1 anterior cerebral artery may be developmental. The right common carotid is patent. There is mild plaque at the carotid bulb. Stent noted involving the mid and distal internal carotid on the right appears patent. There is scattered plaque involving the left common and internal carotid artery without significant stenosis. Images reviewed, interpreted, and dictated by Alfredo Neal DO    CT Angiogram Head    Result Date: 9/17/2024  The vertebral arteries are diminutive bilaterally. Motion artifact does somewhat limit assessment, there may be areas of stenosis on the right. The origin of the  left vertebral artery is not well appreciated. Correlate clinically.  No large vessel occlusion in the head, diminutive appearance the left A1 anterior cerebral artery may be developmental. The right common carotid is patent. There is mild plaque at the carotid bulb. Stent noted involving the mid and distal internal carotid on the right appears patent. There is scattered plaque involving the left common and internal carotid artery without significant stenosis. Images reviewed, interpreted, and dictated by Alfredo Neal DO    CT Abdomen Pelvis Without Contrast    Result Date: 9/17/2024  No acute fracture or malalignment. Severe prostatomegaly, benign prostatic hyperplasia or prostatic carcinoma.. Images personally reviewed, interpreted and dictated by Joceline Boykin.    CT cervical spine without contrast    Result Date: 9/17/2024  No acute intracranial hemorrhage or large acute cortical infarct. Severe chronic microvascular ischemic white matter changes with global volume loss and ventriculomegaly. Moderate right parieto-occipital scalp laceration, contusion and small hematoma. CRITICAL RESULT: No. COMMUNICATION: Per this written report. Images personally reviewed, interpreted, and dictated by JOCELINE Boykin. CT CERVICAL SPINE     9/17/2024 7:48 AM HISTORY: Head trauma, minor (Age >= 65y) Status post fall with neck pain. PROCEDURE: Axial CT images were obtained from the skull base to the thoracic inlet. Coronal and sagittal reformatted images were generated from the axial dataset. This study was performed with techniques to keep radiation doses as low as reasonably achievable, (ALARA). Individualized dose reduction techniques using automated exposure control or adjustment of mA and/or kV according to the patient size were employed. COMPARISON: None. FINDINGS: There is no acute fracture or malalignment. The facets are normally aligned. Multilevel degenerative changes are present. Grade 1 anterolisthesis  of C3 on C4 and C4 on C5. Moderate to severe multilevel degenerative disc disease involving C4-C5, C5-C6, C6-C7 with disc space loss, endplate sclerosis, and large anterior marginal osteophytes. Moderate atlantoaxial joint degenerative changes. No acute paraspinal abnormality. Limited images of the lung apices are unremarkable. Right mid to distal cervical internal carotid artery stent in place (series 6, image 8). Bilateral carotid bulb atherosclerotic disease present. IMPRESSION: No acute fracture of the cervical spine. Degenerative changes. CRITICAL RESULT: No. COMMUNICATION: Per this written report. Images personally reviewed, interpreted, and dictated by JOCELINE Boykin.     CT Head Without Contrast    Result Date: 9/17/2024  No acute intracranial hemorrhage or large acute cortical infarct. Severe chronic microvascular ischemic white matter changes with global volume loss and ventriculomegaly. Moderate right parieto-occipital scalp laceration, contusion and small hematoma. CRITICAL RESULT: No. COMMUNICATION: Per this written report. Images personally reviewed, interpreted, and dictated by JOCELINE Boykin. CT CERVICAL SPINE     9/17/2024 7:48 AM HISTORY: Head trauma, minor (Age >= 65y) Status post fall with neck pain. PROCEDURE: Axial CT images were obtained from the skull base to the thoracic inlet. Coronal and sagittal reformatted images were generated from the axial dataset. This study was performed with techniques to keep radiation doses as low as reasonably achievable, (ALARA). Individualized dose reduction techniques using automated exposure control or adjustment of mA and/or kV according to the patient size were employed. COMPARISON: None. FINDINGS: There is no acute fracture or malalignment. The facets are normally aligned. Multilevel degenerative changes are present. Grade 1 anterolisthesis of C3 on C4 and C4 on C5. Moderate to severe multilevel degenerative disc disease involving C4-C5,  C5-C6, C6-C7 with disc space loss, endplate sclerosis, and large anterior marginal osteophytes. Moderate atlantoaxial joint degenerative changes. No acute paraspinal abnormality. Limited images of the lung apices are unremarkable. Right mid to distal cervical internal carotid artery stent in place (series 6, image 8). Bilateral carotid bulb atherosclerotic disease present. IMPRESSION: No acute fracture of the cervical spine. Degenerative changes. CRITICAL RESULT: No. COMMUNICATION: Per this written report. Images personally reviewed, interpreted, and dictated by JOCELINE Boykin.       Results for orders placed during the hospital encounter of 11/17/22    Duplex Carotid Ultrasound CAR    Interpretation Summary  •  Proximal right internal carotid artery severe stenosis (80 to 99%)..  •  No significant left carotid bifurcation stenosis.  •  There is bilateral carotid bulb plaque.  •  Vertebral flow is antegrade bilaterally.  •  Clinical and/or angiographic correlation is advised regarding the findings in the right internal carotid artery.      Results for orders placed during the hospital encounter of 11/17/22    Duplex Carotid Ultrasound CAR    Interpretation Summary  •  Proximal right internal carotid artery severe stenosis (80 to 99%)..  •  No significant left carotid bifurcation stenosis.  •  There is bilateral carotid bulb plaque.  •  Vertebral flow is antegrade bilaterally.  •  Clinical and/or angiographic correlation is advised regarding the findings in the right internal carotid artery.          Labs Pending at Discharge:        Time spent on Discharge including face to face service: >30 minutes    Electronically signed by Jameson Almanza DO, 09/23/24, 11:40 AM EDT.

## 2024-09-24 ENCOUNTER — TELEPHONE (OUTPATIENT)
Dept: UROLOGY | Facility: CLINIC | Age: 79
End: 2024-09-24
Payer: MEDICARE

## 2024-09-24 ENCOUNTER — TRANSITIONAL CARE MANAGEMENT TELEPHONE ENCOUNTER (OUTPATIENT)
Dept: CALL CENTER | Facility: HOSPITAL | Age: 79
End: 2024-09-24
Payer: MEDICARE

## 2024-09-24 DIAGNOSIS — R33.8 ACUTE URINARY RETENTION: ICD-10-CM

## 2024-09-24 DIAGNOSIS — R33.8 ACUTE URINARY RETENTION: Primary | ICD-10-CM

## 2024-09-24 RX ORDER — CEPHALEXIN 500 MG/1
500 CAPSULE ORAL 3 TIMES DAILY
Qty: 15 CAPSULE | Refills: 0 | Status: SHIPPED | OUTPATIENT
Start: 2024-09-24 | End: 2024-09-28 | Stop reason: HOSPADM

## 2024-09-24 RX ORDER — CEPHALEXIN 500 MG/1
500 CAPSULE ORAL 3 TIMES DAILY
Qty: 15 CAPSULE | Refills: 0 | Status: SHIPPED | OUTPATIENT
Start: 2024-09-24 | End: 2024-09-24

## 2024-09-25 ENCOUNTER — ANESTHESIA EVENT (OUTPATIENT)
Dept: PERIOP | Facility: HOSPITAL | Age: 79
End: 2024-09-25
Payer: MEDICARE

## 2024-09-25 ENCOUNTER — ANESTHESIA (OUTPATIENT)
Dept: PERIOP | Facility: HOSPITAL | Age: 79
End: 2024-09-25
Payer: MEDICARE

## 2024-09-25 ENCOUNTER — HOSPITAL ENCOUNTER (INPATIENT)
Facility: HOSPITAL | Age: 79
LOS: 3 days | End: 2024-09-28
Attending: EMERGENCY MEDICINE | Admitting: HOSPITALIST
Payer: MEDICARE

## 2024-09-25 ENCOUNTER — READMISSION MANAGEMENT (OUTPATIENT)
Dept: CALL CENTER | Facility: HOSPITAL | Age: 79
End: 2024-09-25
Payer: MEDICARE

## 2024-09-25 DIAGNOSIS — R26.2 DIFFICULTY WALKING: ICD-10-CM

## 2024-09-25 DIAGNOSIS — I25.2 HISTORY OF NON-ST ELEVATION MYOCARDIAL INFARCTION (NSTEMI): ICD-10-CM

## 2024-09-25 DIAGNOSIS — R31.9 HEMATURIA, UNSPECIFIED TYPE: Primary | ICD-10-CM

## 2024-09-25 DIAGNOSIS — R31.0 GROSS HEMATURIA: ICD-10-CM

## 2024-09-25 DIAGNOSIS — Z78.9 DECREASED ACTIVITIES OF DAILY LIVING (ADL): ICD-10-CM

## 2024-09-25 LAB
ABO GROUP BLD: NORMAL
ABO GROUP BLD: NORMAL
ALBUMIN SERPL-MCNC: 3.4 G/DL (ref 3.5–5.2)
ALBUMIN/GLOB SERPL: 1.2 G/DL
ALP SERPL-CCNC: 144 U/L (ref 39–117)
ALT SERPL W P-5'-P-CCNC: 67 U/L (ref 1–41)
ANION GAP SERPL CALCULATED.3IONS-SCNC: 7.7 MMOL/L (ref 5–15)
AST SERPL-CCNC: 57 U/L (ref 1–40)
BASOPHILS # BLD AUTO: 0.02 10*3/MM3 (ref 0–0.2)
BASOPHILS NFR BLD AUTO: 0.4 % (ref 0–1.5)
BILIRUB SERPL-MCNC: 0.4 MG/DL (ref 0–1.2)
BLD GP AB SCN SERPL QL: NEGATIVE
BUN SERPL-MCNC: 15 MG/DL (ref 8–23)
BUN/CREAT SERPL: 12.9 (ref 7–25)
CALCIUM SPEC-SCNC: 8.9 MG/DL (ref 8.6–10.5)
CHLORIDE SERPL-SCNC: 102 MMOL/L (ref 98–107)
CO2 SERPL-SCNC: 28.3 MMOL/L (ref 22–29)
CREAT SERPL-MCNC: 1.16 MG/DL (ref 0.76–1.27)
DEPRECATED RDW RBC AUTO: 44.7 FL (ref 37–54)
EGFRCR SERPLBLD CKD-EPI 2021: 64.5 ML/MIN/1.73
EOSINOPHIL # BLD AUTO: 0.25 10*3/MM3 (ref 0–0.4)
EOSINOPHIL NFR BLD AUTO: 5.3 % (ref 0.3–6.2)
ERYTHROCYTE [DISTWIDTH] IN BLOOD BY AUTOMATED COUNT: 14.4 % (ref 12.3–15.4)
GLOBULIN UR ELPH-MCNC: 2.9 GM/DL
GLUCOSE SERPL-MCNC: 102 MG/DL (ref 65–99)
HCT VFR BLD AUTO: 31.7 % (ref 37.5–51)
HGB BLD-MCNC: 10.4 G/DL (ref 13–17.7)
HOLD SPECIMEN: NORMAL
HOLD SPECIMEN: NORMAL
IMM GRANULOCYTES # BLD AUTO: 0.02 10*3/MM3 (ref 0–0.05)
IMM GRANULOCYTES NFR BLD AUTO: 0.4 % (ref 0–0.5)
LYMPHOCYTES # BLD AUTO: 0.98 10*3/MM3 (ref 0.7–3.1)
LYMPHOCYTES NFR BLD AUTO: 20.7 % (ref 19.6–45.3)
MAGNESIUM SERPL-MCNC: 1.6 MG/DL (ref 1.6–2.4)
MCH RBC QN AUTO: 28.5 PG (ref 26.6–33)
MCHC RBC AUTO-ENTMCNC: 32.8 G/DL (ref 31.5–35.7)
MCV RBC AUTO: 86.8 FL (ref 79–97)
MONOCYTES # BLD AUTO: 0.39 10*3/MM3 (ref 0.1–0.9)
MONOCYTES NFR BLD AUTO: 8.2 % (ref 5–12)
NEUTROPHILS NFR BLD AUTO: 3.08 10*3/MM3 (ref 1.7–7)
NEUTROPHILS NFR BLD AUTO: 65 % (ref 42.7–76)
NRBC BLD AUTO-RTO: 0 /100 WBC (ref 0–0.2)
PHOSPHATE SERPL-MCNC: 3.1 MG/DL (ref 2.5–4.5)
PLATELET # BLD AUTO: 333 10*3/MM3 (ref 140–450)
PMV BLD AUTO: 9.2 FL (ref 6–12)
POTASSIUM SERPL-SCNC: 3.8 MMOL/L (ref 3.5–5.2)
PROT SERPL-MCNC: 6.3 G/DL (ref 6–8.5)
RBC # BLD AUTO: 3.65 10*6/MM3 (ref 4.14–5.8)
RH BLD: POSITIVE
RH BLD: POSITIVE
SODIUM SERPL-SCNC: 138 MMOL/L (ref 136–145)
T&S EXPIRATION DATE: NORMAL
WBC NRBC COR # BLD AUTO: 4.74 10*3/MM3 (ref 3.4–10.8)
WHOLE BLOOD HOLD COAG: NORMAL
WHOLE BLOOD HOLD SPECIMEN: NORMAL
WHOLE BLOOD HOLD SPECIMEN: NORMAL

## 2024-09-25 PROCEDURE — 0TCB8ZZ EXTIRPATION OF MATTER FROM BLADDER, VIA NATURAL OR ARTIFICIAL OPENING ENDOSCOPIC: ICD-10-PCS | Performed by: UROLOGY

## 2024-09-25 PROCEDURE — 25010000002 CEFAZOLIN PER 500 MG

## 2024-09-25 PROCEDURE — 25010000002 PROPOFOL 10 MG/ML EMULSION

## 2024-09-25 PROCEDURE — 99222 1ST HOSP IP/OBS MODERATE 55: CPT | Performed by: HOSPITALIST

## 2024-09-25 PROCEDURE — 52214 CYSTOSCOPY AND TREATMENT: CPT | Performed by: UROLOGY

## 2024-09-25 PROCEDURE — 25010000002 MIDAZOLAM PER 1MG: Performed by: ANESTHESIOLOGY

## 2024-09-25 PROCEDURE — 86900 BLOOD TYPING SEROLOGIC ABO: CPT

## 2024-09-25 PROCEDURE — 0TCB8ZZ EXTIRPATION OF MATTER FROM BLADDER, VIA NATURAL OR ARTIFICIAL OPENING ENDOSCOPIC: ICD-10-PCS | Performed by: STUDENT IN AN ORGANIZED HEALTH CARE EDUCATION/TRAINING PROGRAM

## 2024-09-25 PROCEDURE — 83735 ASSAY OF MAGNESIUM: CPT | Performed by: UROLOGY

## 2024-09-25 PROCEDURE — 99284 EMERGENCY DEPT VISIT MOD MDM: CPT | Performed by: UROLOGY

## 2024-09-25 PROCEDURE — 25810000003 LACTATED RINGERS PER 1000 ML: Performed by: ANESTHESIOLOGY

## 2024-09-25 PROCEDURE — 36415 COLL VENOUS BLD VENIPUNCTURE: CPT | Performed by: EMERGENCY MEDICINE

## 2024-09-25 PROCEDURE — 0T9B80Z DRAINAGE OF BLADDER WITH DRAINAGE DEVICE, VIA NATURAL OR ARTIFICIAL OPENING ENDOSCOPIC: ICD-10-PCS | Performed by: STUDENT IN AN ORGANIZED HEALTH CARE EDUCATION/TRAINING PROGRAM

## 2024-09-25 PROCEDURE — 0T9B80Z DRAINAGE OF BLADDER WITH DRAINAGE DEVICE, VIA NATURAL OR ARTIFICIAL OPENING ENDOSCOPIC: ICD-10-PCS | Performed by: UROLOGY

## 2024-09-25 PROCEDURE — 85025 COMPLETE CBC W/AUTO DIFF WBC: CPT

## 2024-09-25 PROCEDURE — 0V508ZZ DESTRUCTION OF PROSTATE, VIA NATURAL OR ARTIFICIAL OPENING ENDOSCOPIC: ICD-10-PCS | Performed by: STUDENT IN AN ORGANIZED HEALTH CARE EDUCATION/TRAINING PROGRAM

## 2024-09-25 PROCEDURE — 86850 RBC ANTIBODY SCREEN: CPT | Performed by: UROLOGY

## 2024-09-25 PROCEDURE — 86901 BLOOD TYPING SEROLOGIC RH(D): CPT | Performed by: UROLOGY

## 2024-09-25 PROCEDURE — 99285 EMERGENCY DEPT VISIT HI MDM: CPT

## 2024-09-25 PROCEDURE — 86901 BLOOD TYPING SEROLOGIC RH(D): CPT

## 2024-09-25 PROCEDURE — 0W3R8ZZ CONTROL BLEEDING IN GENITOURINARY TRACT, VIA NATURAL OR ARTIFICIAL OPENING ENDOSCOPIC: ICD-10-PCS | Performed by: UROLOGY

## 2024-09-25 PROCEDURE — 0V508ZZ DESTRUCTION OF PROSTATE, VIA NATURAL OR ARTIFICIAL OPENING ENDOSCOPIC: ICD-10-PCS | Performed by: UROLOGY

## 2024-09-25 PROCEDURE — 84100 ASSAY OF PHOSPHORUS: CPT | Performed by: UROLOGY

## 2024-09-25 PROCEDURE — 52001 CYSTO W/IRRG&EVAC MLT CLOTS: CPT | Performed by: UROLOGY

## 2024-09-25 PROCEDURE — 80053 COMPREHEN METABOLIC PANEL: CPT | Performed by: EMERGENCY MEDICINE

## 2024-09-25 PROCEDURE — 86900 BLOOD TYPING SEROLOGIC ABO: CPT | Performed by: UROLOGY

## 2024-09-25 PROCEDURE — 25010000002 HYDROMORPHONE 1 MG/ML SOLUTION

## 2024-09-25 RX ORDER — MIDAZOLAM HYDROCHLORIDE 2 MG/2ML
1 INJECTION, SOLUTION INTRAMUSCULAR; INTRAVENOUS ONCE
Status: COMPLETED | OUTPATIENT
Start: 2024-09-25 | End: 2024-09-25

## 2024-09-25 RX ORDER — METOPROLOL SUCCINATE 50 MG/1
50 TABLET, EXTENDED RELEASE ORAL DAILY
Status: DISCONTINUED | OUTPATIENT
Start: 2024-09-25 | End: 2024-09-28 | Stop reason: HOSPADM

## 2024-09-25 RX ORDER — AMLODIPINE BESYLATE 5 MG/1
5 TABLET ORAL
Status: DISCONTINUED | OUTPATIENT
Start: 2024-09-25 | End: 2024-09-28 | Stop reason: HOSPADM

## 2024-09-25 RX ORDER — KETAMINE HCL IN NACL, ISO-OSM 100MG/10ML
SYRINGE (ML) INJECTION AS NEEDED
Status: DISCONTINUED | OUTPATIENT
Start: 2024-09-25 | End: 2024-09-25 | Stop reason: SURG

## 2024-09-25 RX ORDER — SODIUM CHLORIDE 0.9 % (FLUSH) 0.9 %
10 SYRINGE (ML) INJECTION EVERY 12 HOURS SCHEDULED
Status: DISCONTINUED | OUTPATIENT
Start: 2024-09-25 | End: 2024-09-28 | Stop reason: HOSPADM

## 2024-09-25 RX ORDER — ONDANSETRON 2 MG/ML
4 INJECTION INTRAMUSCULAR; INTRAVENOUS EVERY 6 HOURS PRN
Status: DISCONTINUED | OUTPATIENT
Start: 2024-09-25 | End: 2024-09-28 | Stop reason: HOSPADM

## 2024-09-25 RX ORDER — OXYCODONE HYDROCHLORIDE 5 MG/1
5 TABLET ORAL
Status: COMPLETED | OUTPATIENT
Start: 2024-09-25 | End: 2024-09-25

## 2024-09-25 RX ORDER — BISACODYL 10 MG
10 SUPPOSITORY, RECTAL RECTAL DAILY PRN
Status: DISCONTINUED | OUTPATIENT
Start: 2024-09-25 | End: 2024-09-28 | Stop reason: HOSPADM

## 2024-09-25 RX ORDER — PROPOFOL 10 MG/ML
VIAL (ML) INTRAVENOUS AS NEEDED
Status: DISCONTINUED | OUTPATIENT
Start: 2024-09-25 | End: 2024-09-25 | Stop reason: SURG

## 2024-09-25 RX ORDER — CEFAZOLIN SODIUM 1 G/3ML
INJECTION, POWDER, FOR SOLUTION INTRAMUSCULAR; INTRAVENOUS AS NEEDED
Status: DISCONTINUED | OUTPATIENT
Start: 2024-09-25 | End: 2024-09-25 | Stop reason: SURG

## 2024-09-25 RX ORDER — HYDROXYZINE HYDROCHLORIDE 25 MG/1
50 TABLET, FILM COATED ORAL NIGHTLY PRN
Status: DISCONTINUED | OUTPATIENT
Start: 2024-09-25 | End: 2024-09-28 | Stop reason: HOSPADM

## 2024-09-25 RX ORDER — HYDROCODONE BITARTRATE AND ACETAMINOPHEN 7.5; 325 MG/1; MG/1
1 TABLET ORAL EVERY 6 HOURS PRN
Status: DISCONTINUED | OUTPATIENT
Start: 2024-09-25 | End: 2024-09-28 | Stop reason: HOSPADM

## 2024-09-25 RX ORDER — ONDANSETRON 4 MG/1
4 TABLET, ORALLY DISINTEGRATING ORAL EVERY 6 HOURS PRN
Status: DISCONTINUED | OUTPATIENT
Start: 2024-09-25 | End: 2024-09-28 | Stop reason: HOSPADM

## 2024-09-25 RX ORDER — ONDANSETRON 2 MG/ML
4 INJECTION INTRAMUSCULAR; INTRAVENOUS ONCE AS NEEDED
Status: DISCONTINUED | OUTPATIENT
Start: 2024-09-25 | End: 2024-09-25

## 2024-09-25 RX ORDER — LIDOCAINE HYDROCHLORIDE 20 MG/ML
INJECTION, SOLUTION EPIDURAL; INFILTRATION; INTRACAUDAL; PERINEURAL AS NEEDED
Status: DISCONTINUED | OUTPATIENT
Start: 2024-09-25 | End: 2024-09-25 | Stop reason: SURG

## 2024-09-25 RX ORDER — POLYETHYLENE GLYCOL 3350 17 G/17G
17 POWDER, FOR SOLUTION ORAL DAILY PRN
Status: DISCONTINUED | OUTPATIENT
Start: 2024-09-25 | End: 2024-09-28 | Stop reason: HOSPADM

## 2024-09-25 RX ORDER — SODIUM CHLORIDE, SODIUM LACTATE, POTASSIUM CHLORIDE, CALCIUM CHLORIDE 600; 310; 30; 20 MG/100ML; MG/100ML; MG/100ML; MG/100ML
9 INJECTION, SOLUTION INTRAVENOUS CONTINUOUS PRN
Status: DISCONTINUED | OUTPATIENT
Start: 2024-09-25 | End: 2024-09-28 | Stop reason: HOSPADM

## 2024-09-25 RX ORDER — FINASTERIDE 5 MG/1
5 TABLET, FILM COATED ORAL DAILY
Status: DISCONTINUED | OUTPATIENT
Start: 2024-09-25 | End: 2024-09-28 | Stop reason: HOSPADM

## 2024-09-25 RX ORDER — ACETAMINOPHEN 160 MG/5ML
650 SOLUTION ORAL EVERY 4 HOURS PRN
Status: DISCONTINUED | OUTPATIENT
Start: 2024-09-25 | End: 2024-09-28 | Stop reason: HOSPADM

## 2024-09-25 RX ORDER — MAGNESIUM HYDROXIDE 1200 MG/15ML
LIQUID ORAL AS NEEDED
Status: DISCONTINUED | OUTPATIENT
Start: 2024-09-25 | End: 2024-09-25 | Stop reason: HOSPADM

## 2024-09-25 RX ORDER — ACETAMINOPHEN 650 MG/1
650 SUPPOSITORY RECTAL EVERY 4 HOURS PRN
Status: DISCONTINUED | OUTPATIENT
Start: 2024-09-25 | End: 2024-09-28 | Stop reason: HOSPADM

## 2024-09-25 RX ORDER — PANTOPRAZOLE SODIUM 40 MG/1
40 TABLET, DELAYED RELEASE ORAL DAILY
Status: DISCONTINUED | OUTPATIENT
Start: 2024-09-26 | End: 2024-09-28 | Stop reason: HOSPADM

## 2024-09-25 RX ORDER — SODIUM CHLORIDE 9 MG/ML
40 INJECTION, SOLUTION INTRAVENOUS AS NEEDED
Status: DISCONTINUED | OUTPATIENT
Start: 2024-09-25 | End: 2024-09-28 | Stop reason: HOSPADM

## 2024-09-25 RX ORDER — SODIUM CHLORIDE 0.9 % (FLUSH) 0.9 %
10 SYRINGE (ML) INJECTION AS NEEDED
Status: DISCONTINUED | OUTPATIENT
Start: 2024-09-25 | End: 2024-09-28 | Stop reason: HOSPADM

## 2024-09-25 RX ORDER — FERROUS SULFATE 325(65) MG
325 TABLET ORAL
Status: DISCONTINUED | OUTPATIENT
Start: 2024-09-26 | End: 2024-09-28 | Stop reason: HOSPADM

## 2024-09-25 RX ORDER — ACETAMINOPHEN 325 MG/1
650 TABLET ORAL EVERY 4 HOURS PRN
Status: DISCONTINUED | OUTPATIENT
Start: 2024-09-25 | End: 2024-09-28 | Stop reason: HOSPADM

## 2024-09-25 RX ORDER — BISACODYL 5 MG/1
5 TABLET, DELAYED RELEASE ORAL DAILY PRN
Status: DISCONTINUED | OUTPATIENT
Start: 2024-09-25 | End: 2024-09-28 | Stop reason: HOSPADM

## 2024-09-25 RX ORDER — FUROSEMIDE 20 MG
10 TABLET ORAL DAILY
Status: DISCONTINUED | OUTPATIENT
Start: 2024-09-25 | End: 2024-09-28 | Stop reason: HOSPADM

## 2024-09-25 RX ORDER — LOSARTAN POTASSIUM 50 MG/1
50 TABLET ORAL 2 TIMES DAILY
Status: DISCONTINUED | OUTPATIENT
Start: 2024-09-25 | End: 2024-09-28 | Stop reason: HOSPADM

## 2024-09-25 RX ORDER — MEMANTINE HYDROCHLORIDE 5 MG/1
5 TABLET ORAL 2 TIMES DAILY
Status: DISCONTINUED | OUTPATIENT
Start: 2024-09-25 | End: 2024-09-28 | Stop reason: HOSPADM

## 2024-09-25 RX ORDER — TAMSULOSIN HYDROCHLORIDE 0.4 MG/1
0.4 CAPSULE ORAL DAILY
Status: DISCONTINUED | OUTPATIENT
Start: 2024-09-25 | End: 2024-09-28 | Stop reason: HOSPADM

## 2024-09-25 RX ORDER — AMOXICILLIN 250 MG
2 CAPSULE ORAL 2 TIMES DAILY
Status: DISCONTINUED | OUTPATIENT
Start: 2024-09-25 | End: 2024-09-28 | Stop reason: HOSPADM

## 2024-09-25 RX ADMIN — OXYCODONE HYDROCHLORIDE 5 MG: 5 TABLET ORAL at 17:20

## 2024-09-25 RX ADMIN — PROPOFOL 10 MG: 10 INJECTION, EMULSION INTRAVENOUS at 16:20

## 2024-09-25 RX ADMIN — HYDROCODONE BITARTRATE AND ACETAMINOPHEN 1 TABLET: 7.5; 325 TABLET ORAL at 18:26

## 2024-09-25 RX ADMIN — SODIUM CHLORIDE, POTASSIUM CHLORIDE, SODIUM LACTATE AND CALCIUM CHLORIDE: 600; 310; 30; 20 INJECTION, SOLUTION INTRAVENOUS at 15:57

## 2024-09-25 RX ADMIN — PROPOFOL 50 MG: 10 INJECTION, EMULSION INTRAVENOUS at 16:02

## 2024-09-25 RX ADMIN — OXYCODONE HYDROCHLORIDE 5 MG: 5 TABLET ORAL at 17:41

## 2024-09-25 RX ADMIN — CEFAZOLIN 2 G: 1 INJECTION, POWDER, FOR SOLUTION INTRAMUSCULAR; INTRAVENOUS at 16:07

## 2024-09-25 RX ADMIN — HYDROMORPHONE HYDROCHLORIDE 0.25 MG: 1 INJECTION, SOLUTION INTRAMUSCULAR; INTRAVENOUS; SUBCUTANEOUS at 17:19

## 2024-09-25 RX ADMIN — LIDOCAINE HYDROCHLORIDE 100 MG: 20 INJECTION, SOLUTION INTRAVENOUS at 16:02

## 2024-09-25 RX ADMIN — PROPOFOL 40 MG: 10 INJECTION, EMULSION INTRAVENOUS at 16:04

## 2024-09-25 RX ADMIN — SODIUM CHLORIDE, POTASSIUM CHLORIDE, SODIUM LACTATE AND CALCIUM CHLORIDE: 600; 310; 30; 20 INJECTION, SOLUTION INTRAVENOUS at 16:46

## 2024-09-25 RX ADMIN — MIDAZOLAM HYDROCHLORIDE 1 MG: 1 INJECTION, SOLUTION INTRAMUSCULAR; INTRAVENOUS at 15:48

## 2024-09-25 RX ADMIN — Medication 5 MG: at 16:41

## 2024-09-25 RX ADMIN — LOSARTAN POTASSIUM 50 MG: 50 TABLET ORAL at 22:07

## 2024-09-25 RX ADMIN — Medication 10 ML: at 22:07

## 2024-09-25 RX ADMIN — Medication 10 MG: at 16:02

## 2024-09-25 RX ADMIN — SENNOSIDES AND DOCUSATE SODIUM 2 TABLET: 50; 8.6 TABLET ORAL at 22:07

## 2024-09-25 RX ADMIN — HYDROMORPHONE HYDROCHLORIDE 0.25 MG: 1 INJECTION, SOLUTION INTRAMUSCULAR; INTRAVENOUS; SUBCUTANEOUS at 17:13

## 2024-09-25 RX ADMIN — PROPOFOL 100 MCG/KG/MIN: 10 INJECTION, EMULSION INTRAVENOUS at 16:02

## 2024-09-25 RX ADMIN — MEMANTINE 5 MG: 5 TABLET ORAL at 22:07
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
## 2024-09-25 NOTE — PLAN OF CARE
Goal Outcome Evaluation:  Plan of Care Reviewed With: patient        Progress: improving  Outcome Evaluation: Pt admitted to floor from pacu. Ambulated to bed with standby assist. CBI at fast rate. Urine is pink and clear. Pt tolerated regular diet. Given pain pill x1. Family at bedside.

## 2024-09-25 NOTE — OP NOTE
CYSTOSCOPY WITH CLOT EVACUATION  Procedure Report    Patient Name:  Dixon Amador  YOB: 1945    Date of Surgery:  9/25/2024     Indications: Gross hematuria clot urinary retention    Pre-op Diagnosis:   Gross hematuria [R31.0]       Post-Op Diagnosis Codes:     * Gross hematuria [R31.0]    Procedure/CPT® Codes:      Procedure(s):  CYSTOSCOPY WITH CLOT EVACUATION, WITH FULGURATION OF BLEEDING VESSELS        Staff:  Surgeon(s):  Derian Pineda MD         Anesthesia: Monitored Anesthesia Care    Estimated Blood Loss: 5 mL    Implants:    Nothing was implanted during the procedure    Specimen:          None        Findings: Bleeding diffusely in the prostatic urethra.    Complications: None    Description of Procedure: After informed consent was obtained patient was taken to the operating room.  Intravenous sedation was administered.  He was placed in a dorsolithotomy position.  The groin was prepped and draped in a sterile fashion.  Cystoscopy was performed with a 21 Marshallese sheath within the bladder we saw significant amount of clots and we used a Nely syringe to remove all the clots.  I had very poor visualization of the bladder because there appeared to be a significant influx of blood I presume from the prostate I could not see the bladder very well.  I took the cystoscope out and placed a resectoscope into the bladder and that made visualization a little better but still I could not see the bladder mucosa all that great but I did see diffuse bleeding in the prostatic urethra and that is when I utilized a loop bipolar gyrus to cauterize the bleeding vessels in the prostatic urethra thoroughly.  I could not see any bleeding but within the bladder again visualization remained poor so I took the scope out and with the help of a catheter guide I passed a 22 Marshallese three-way Almeida catheter into the bladder and removed the guide injected 45 mL in the balloon and started continuous bladder  irrigation.  The E flux was bloody at the end of the case.  However gradually the E flux became clear.  Patient tolerated the procedure well there were no complications he was awake alert in stable condition when transported to recovery room.                  Derian Pineda MD     Date: 9/25/2024  Time: 17:02 EDT
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
## 2024-09-26 LAB
ANION GAP SERPL CALCULATED.3IONS-SCNC: 8.6 MMOL/L (ref 5–15)
BASOPHILS # BLD AUTO: 0.03 10*3/MM3 (ref 0–0.2)
BASOPHILS NFR BLD AUTO: 0.4 % (ref 0–1.5)
BUN SERPL-MCNC: 15 MG/DL (ref 8–23)
BUN/CREAT SERPL: 13.2 (ref 7–25)
CALCIUM SPEC-SCNC: 8.3 MG/DL (ref 8.6–10.5)
CHLORIDE SERPL-SCNC: 99 MMOL/L (ref 98–107)
CO2 SERPL-SCNC: 25.4 MMOL/L (ref 22–29)
CREAT SERPL-MCNC: 1.14 MG/DL (ref 0.76–1.27)
DEPRECATED RDW RBC AUTO: 45.3 FL (ref 37–54)
EGFRCR SERPLBLD CKD-EPI 2021: 65.8 ML/MIN/1.73
EOSINOPHIL # BLD AUTO: 0.2 10*3/MM3 (ref 0–0.4)
EOSINOPHIL NFR BLD AUTO: 2.4 % (ref 0.3–6.2)
ERYTHROCYTE [DISTWIDTH] IN BLOOD BY AUTOMATED COUNT: 14.4 % (ref 12.3–15.4)
GLUCOSE SERPL-MCNC: 110 MG/DL (ref 65–99)
HCT VFR BLD AUTO: 27.4 % (ref 37.5–51)
HCT VFR BLD AUTO: 28.5 % (ref 37.5–51)
HGB BLD-MCNC: 8.9 G/DL (ref 13–17.7)
HGB BLD-MCNC: 9.3 G/DL (ref 13–17.7)
IMM GRANULOCYTES # BLD AUTO: 0.03 10*3/MM3 (ref 0–0.05)
IMM GRANULOCYTES NFR BLD AUTO: 0.4 % (ref 0–0.5)
LYMPHOCYTES # BLD AUTO: 0.68 10*3/MM3 (ref 0.7–3.1)
LYMPHOCYTES NFR BLD AUTO: 8 % (ref 19.6–45.3)
MAGNESIUM SERPL-MCNC: 1.3 MG/DL (ref 1.6–2.4)
MCH RBC QN AUTO: 28.3 PG (ref 26.6–33)
MCHC RBC AUTO-ENTMCNC: 32.5 G/DL (ref 31.5–35.7)
MCV RBC AUTO: 87.3 FL (ref 79–97)
MONOCYTES # BLD AUTO: 0.53 10*3/MM3 (ref 0.1–0.9)
MONOCYTES NFR BLD AUTO: 6.3 % (ref 5–12)
NEUTROPHILS NFR BLD AUTO: 7.01 10*3/MM3 (ref 1.7–7)
NEUTROPHILS NFR BLD AUTO: 82.5 % (ref 42.7–76)
NRBC BLD AUTO-RTO: 0 /100 WBC (ref 0–0.2)
PHOSPHATE SERPL-MCNC: 3.1 MG/DL (ref 2.5–4.5)
PLATELET # BLD AUTO: 294 10*3/MM3 (ref 140–450)
PMV BLD AUTO: 9.7 FL (ref 6–12)
POTASSIUM SERPL-SCNC: 3.9 MMOL/L (ref 3.5–5.2)
RBC # BLD AUTO: 3.14 10*6/MM3 (ref 4.14–5.8)
SODIUM SERPL-SCNC: 133 MMOL/L (ref 136–145)
WBC NRBC COR # BLD AUTO: 8.48 10*3/MM3 (ref 3.4–10.8)

## 2024-09-26 PROCEDURE — 85014 HEMATOCRIT: CPT | Performed by: STUDENT IN AN ORGANIZED HEALTH CARE EDUCATION/TRAINING PROGRAM

## 2024-09-26 PROCEDURE — 99232 SBSQ HOSP IP/OBS MODERATE 35: CPT | Performed by: UROLOGY

## 2024-09-26 PROCEDURE — 99232 SBSQ HOSP IP/OBS MODERATE 35: CPT | Performed by: STUDENT IN AN ORGANIZED HEALTH CARE EDUCATION/TRAINING PROGRAM

## 2024-09-26 PROCEDURE — 80048 BASIC METABOLIC PNL TOTAL CA: CPT | Performed by: UROLOGY

## 2024-09-26 PROCEDURE — 85018 HEMOGLOBIN: CPT | Performed by: STUDENT IN AN ORGANIZED HEALTH CARE EDUCATION/TRAINING PROGRAM

## 2024-09-26 PROCEDURE — 97165 OT EVAL LOW COMPLEX 30 MIN: CPT

## 2024-09-26 PROCEDURE — 85025 COMPLETE CBC W/AUTO DIFF WBC: CPT | Performed by: UROLOGY

## 2024-09-26 PROCEDURE — 84100 ASSAY OF PHOSPHORUS: CPT | Performed by: UROLOGY

## 2024-09-26 PROCEDURE — 83735 ASSAY OF MAGNESIUM: CPT | Performed by: UROLOGY

## 2024-09-26 PROCEDURE — 97161 PT EVAL LOW COMPLEX 20 MIN: CPT

## 2024-09-26 PROCEDURE — 25010000002 MAGNESIUM SULFATE 4 GM/100ML SOLUTION: Performed by: HOSPITALIST

## 2024-09-26 RX ORDER — MAGNESIUM SULFATE HEPTAHYDRATE 40 MG/ML
4 INJECTION, SOLUTION INTRAVENOUS ONCE
Status: COMPLETED | OUTPATIENT
Start: 2024-09-26 | End: 2024-09-26

## 2024-09-26 RX ADMIN — FERROUS SULFATE TAB 325 MG (65 MG ELEMENTAL FE) 325 MG: 325 (65 FE) TAB at 08:15

## 2024-09-26 RX ADMIN — LOSARTAN POTASSIUM 50 MG: 50 TABLET ORAL at 21:05

## 2024-09-26 RX ADMIN — AMLODIPINE BESYLATE 5 MG: 5 TABLET ORAL at 08:15

## 2024-09-26 RX ADMIN — LOSARTAN POTASSIUM 50 MG: 50 TABLET ORAL at 08:15

## 2024-09-26 RX ADMIN — METOPROLOL SUCCINATE 50 MG: 50 TABLET, EXTENDED RELEASE ORAL at 08:15

## 2024-09-26 RX ADMIN — Medication 10 ML: at 08:16

## 2024-09-26 RX ADMIN — FUROSEMIDE 10 MG: 20 TABLET ORAL at 08:15

## 2024-09-26 RX ADMIN — FINASTERIDE 5 MG: 5 TABLET, FILM COATED ORAL at 08:15

## 2024-09-26 RX ADMIN — MEMANTINE 5 MG: 5 TABLET ORAL at 08:15

## 2024-09-26 RX ADMIN — LEVOTHYROXINE SODIUM 137 MCG: 0.11 TABLET ORAL at 08:14

## 2024-09-26 RX ADMIN — MAGNESIUM SULFATE HEPTAHYDRATE 4 G: 4 INJECTION, SOLUTION INTRAVENOUS at 08:14

## 2024-09-26 RX ADMIN — SENNOSIDES AND DOCUSATE SODIUM 2 TABLET: 50; 8.6 TABLET ORAL at 08:15

## 2024-09-26 RX ADMIN — Medication 10 ML: at 21:06

## 2024-09-26 RX ADMIN — MEMANTINE 5 MG: 5 TABLET ORAL at 21:05

## 2024-09-26 RX ADMIN — PANTOPRAZOLE SODIUM 40 MG: 40 TABLET, DELAYED RELEASE ORAL at 08:15

## 2024-09-26 NOTE — PLAN OF CARE
Goal Outcome Evaluation:           Progress: improving  Outcome Evaluation: Patient A&Ox4. CBI going at moderate rate with light pink output. No complaints of pain while on shift. Patient ambulated to bathroom with SBA. Tolerating diet well.

## 2024-09-26 NOTE — PLAN OF CARE
Goal Outcome Evaluation:  Plan of Care Reviewed With: patient        Progress: no change  Outcome Evaluation: Patient presents with limitations in self-care, functional transfers, balance, and endurance. He would benefit from continued skilled occupational therapy services to maximize independence with ADLs/functional transfers.      Anticipated Discharge Disposition (OT): sub acute care setting                         Hx of multiple blood transfusions and failed LESLI, lenalidomide, now on azacitidine.  - f/u Dr. Monroy recs Hx of multiple blood transfusions and failed LESLI, lenalidomide, now on azacitidine (5 days on, 3 weeks off- just completed day 5/5).  - f/u Dr. Monroy recs
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
## 2024-09-26 NOTE — PROGRESS NOTES
[facility]     Progress Note    Patient Name: Dixon Amador  : 1945  MRN: [id number]  Primary Care Physician:  Shayy Galaviz APRN  Date of admission: 2024    Subjective   Subjective     Chief Complaint: No complaints this morning    Blood in Urine      Patient Reports patient during her recent hospitalization stay was started on 2 anticoagulation medications.  He was in urinary retention and a Alme[place] catheter was inserted.  Patient went home but came back to the hospital with severe gross hematuria clot urinary retention.  He was taken to the operating room for cystoscopy clot evacuation was performed.  This morning he is doing well he has no complaints.  He has a three-way Alme[place] catheter in and continuous bladder irrigation.    Review of Systems   Genitourinary:  Positive for hematuria.       Objective   Objective     Vitals:   Temp:  [97.2 °F (36.2 °C)-98.3 °F (36.8 °C)] 98.2 °F (36.8 °C)  Heart Rate:  [62-93] 81  Resp:  [15-23] 18  BP: (102-170)/(64-98) 139/70  Flow (L/min):  [2-4] 2    Physical Exam   Awake alert oriented x 3  Afebrile vital signs are stable  Abdomen is soft and benign  Lameida catheter is in place continuous bladder irrigation is going his urine is slightly pink.  Result Review    Result Review:  I have personally reviewed the results from the time of this admission to 2024 10:24 EDT and agree with these findings:  [x]  Laboratory list / accordion  []  Microbiology  []  Radiology  []  EKG/Telemetry   []  Cardiology/Vascular   []  Pathology  []  Old records  []  Other:  Most notable findings include: Stable      Assessment & Plan   Assessment / Plan     Brief Patient Summary:  Dixon Amador is a 78 y.o. male who clot evacuation performed on patient yesterday.    Active Hospital Problems:  Active Hospital Problems    Diagnosis     **Gross hematuria     Hematuria      Plan:   Patient underwent cystoscopy clot evacuation yesterday.  He was having gross hematuria secondary  to anticoagulation medications and a Alme[place] catheter.  I will hold off on the anticoagulants at this point that the patient clear up and follow-up with outpatient urology and with either Dr. Oviedo or Dr. Lombardo who have seen him in his recent hospitalization stay.    VTE Prophylaxis:  Pharmacologic & mechanical VTE prophylaxis orders are present.        CODE STATUS:    Level Of Support Discussed With: Patient  Code Status (Patient has no pulse and is not breathing): CPR (Attempt to Resuscitate)  Medical Interventions (Patient has pulse or is breathing): Full Support    Disposition:  I expect patient to be discharged home.    Derian Pineda MD

## 2024-09-26 NOTE — PLAN OF CARE
Problem: Adult Inpatient Plan of Care  Goal: Plan of Care Review  Outcome: Progressing  Flowsheets (Taken 9/26/2024 1334)  Outcome Evaluation: Patient alert and oriented throughout shift. Patient is on room air with no signs of distress. Patient is still receiving CBI. Urine is pink and clear. Will continue with patient plan of care.   Goal Outcome Evaluation:              Outcome Evaluation: Patient alert and oriented throughout shift. Patient is on room air with no signs of distress. Patient is still receiving CBI. Urine is pink and clear. Will continue with patient plan of care.

## 2024-09-26 NOTE — PLAN OF CARE
Goal Outcome Evaluation:  Plan of Care Reviewed With: patient, spouse        Progress: no change  Outcome Evaluation: Patient presents with deficits in balance, endurance, transfers, and ambulation. Patient will benefit from skilled PT services to address these mobility deficits and decrease risk of falls.      Anticipated Discharge Disposition (PT): sub acute care setting
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
## 2024-09-26 NOTE — PROGRESS NOTES
" [facility]   Hospitalist Progress Note  Date: 2024  Patient Name: Dixon Amador  : 1945  MRN: [id number]  Date of admission: 2024  Room/Bed: [facility]      Subjective   Subjective     Chief Complaint: Gross hematuria     Summary:Dixon Amador is a 78 y.o. male  with past medical history of hypertension, coronary artery disease status post PCI 2024 on Brilinta, atrial fibrillation on Eliquis, carotid artery stenosis, hypothyroidism who presented to emergency room with hematuria. Urology was consulted and patient underwent Cystoscopy with clot evacuation with fulguration of vleeding vessels on presentation on .     Patient was in the hospital for a similar issue from -. During that hospitalization, antiplatelet/anticoagulation was held briefly. Urology consulted.  Three-way catheter placed with continuous bladder irrigation with resolution of hematuria was ordered.  He completed empiric course of Rocephin. He was restarted on Brilinta and Eliquis. Failed voiding trial, was discharged home with Almeida catheter, Flomax and Finasteride with close urology follow-up planned for TURP consideration pending cardiology clearance.      Per Dr. Galvez's note on 2024: \"he can hold his Eliquis for 3 full days before the surgery, he may NOT come off of antiplatelet therapy.  Currently he is on Effient.  If it would be lower surgical risk for him to be on aspirin instead we could switch him over to aspirin but he cannot be completely off antiplatelet therapy for the surgery.  Please let me know if you would rather him be switched over to aspirin instead of Effient during the perioperative period and we can communicate with the patient.\"      Admitted for further evaluation and management. Urology on board.    Interval Followup: No acute events overnight. Hb stable today. Stated his urine still looks reddish but slowly improving. Denied any fever, chills, rigors, chest pain or " SOA.    Review of Systems    All systems reviewed and negative except for what is outlined above.      Objective   Objective     Vitals:   Temp:  [97.2 °F (36.2 °C)-98.3 °F (36.8 °C)] 98.2 °F (36.8 °C)  Heart Rate:  [62-93] 81  Resp:  [15-23] 18  BP: (102-170)/(64-98) 139/70  Flow (L/min):  [2-4] 2    Physical Exam   General: Awake, alert, NAD  Cardiovascular: RRR, no murmurs   Pulmonary: CTA bilaterally; no wheezes; no conversational dyspnea  Gastrointestinal: S/ND/NT, +BS    Result Review    Result Review:  I have personally reviewed these results:  [x]  Laboratory      Lab 09/26/24 0443 09/25/24 1114 09/23/24  0410 09/22/24 0330 09/20/24 0408 09/19/24 1822   WBC 8.48 4.74  --  3.89   < > 8.92   HEMOGLOBIN 8.9* 10.4* 9.9* 10.2*   < > 12.5*   HEMATOCRIT 27.4* 31.7* 29.4* 30.2*   < > 37.4*   PLATELETS 294 333  --  243   < > 255   NEUTROS ABS 7.01* 3.08  --   --   --  7.53*   IMMATURE GRANS (ABS) 0.03 0.02  --   --   --  0.03   LYMPHS ABS 0.68* 0.98  --   --   --  0.67*   MONOS ABS 0.53 0.39  --   --   --  0.56   EOS ABS 0.20 0.25  --   --   --  0.11   MCV 87.3 86.8  --  85.8   < > 86.8    < > = values in this interval not displayed.         Lab 09/26/24 0443 09/25/24  1827 09/25/24 1114 09/22/24  0330   SODIUM 133*  --  138 137   POTASSIUM 3.9  --  3.8 3.6   CHLORIDE 99  --  102 101   CO2 25.4  --  28.3 27.1   ANION GAP 8.6  --  7.7 8.9   BUN 15  --  15 15   CREATININE 1.14  --  1.16 1.12   EGFR 65.8  --  64.5 67.2   GLUCOSE 110*  --  102* 93   CALCIUM 8.3*  --  8.9 8.3*   MAGNESIUM 1.3* 1.6  --   --    PHOSPHORUS 3.1 3.1  --   --          Lab 09/25/24  1114 09/19/24  1822   TOTAL PROTEIN 6.3 6.7   ALBUMIN 3.4* 3.5   GLOBULIN 2.9 3.2   ALT (SGPT) 67* 12   AST (SGOT) 57* 25   BILIRUBIN 0.4 0.5   ALK PHOS 144* 103   LIPASE  --  37         Lab 09/19/24  1822   HSTROP T 28*             Lab 09/25/24  1827   ABO TYPING O   RH TYPING Positive   ANTIBODY SCREEN Negative         Brief Urine Lab Results       None           [x]  Microbiology   Microbiology Results (last 10 days)       ** No results found for the last 240 hours. **          [x]  Radiology  No radiology results for the last 7 days  []  EKG/Telemetry   []  Cardiology/Vascular   []  Pathology  []  Old records  []  Other:    Assessment & Plan   Assessment / Plan     Assessment:  Gross hematuria  Severe prostatomegaly on CT  Urinary retention  UTI from unspecified organism  Paroxysmal atrial fibrillation on Eliquis  CAD with previous PCI  Peripheral vascular disease  Severe right ICA stenosis   Hypertension  Hypothyroidism  Iron deficiency anemia    Plan:  Patient is being managed in medicine service.  patient underwent Cystoscopy with clot evacuation with fulguration of vleeding vessels on presentation on 09/25.  Hematuria thought secondary to anticoagulation and galvan catheter.  Holding off anticoagulation.  Patient to follow up with Dr. Oviedo or Dr. San as outpatient upon discharge.  Hb stable.  Will start patient back on Brilinta given his PCI for CAD on 02/2024. Will monitor Hb closely.  Continue rest of the current management.  PT/OT.  Clinical course to determine disposition.     Discussed with RN.    VTE Prophylaxis:  Pharmacologic & mechanical VTE prophylaxis orders are present.        CODE STATUS:   Level Of Support Discussed With: Patient  Code Status (Patient has no pulse and is not breathing): CPR (Attempt to Resuscitate)  Medical Interventions (Patient has pulse or is breathing): Full Support      Electronically signed by Drea Rodriguez MD, 09/26/24, 8:24 AM EDT.

## 2024-09-27 LAB
ANION GAP SERPL CALCULATED.3IONS-SCNC: 6.5 MMOL/L (ref 5–15)
BASOPHILS # BLD AUTO: 0.02 10*3/MM3 (ref 0–0.2)
BASOPHILS NFR BLD AUTO: 0.3 % (ref 0–1.5)
BUN SERPL-MCNC: 15 MG/DL (ref 8–23)
BUN/CREAT SERPL: 11.6 (ref 7–25)
CALCIUM SPEC-SCNC: 8.4 MG/DL (ref 8.6–10.5)
CHLORIDE SERPL-SCNC: 100 MMOL/L (ref 98–107)
CO2 SERPL-SCNC: 27.5 MMOL/L (ref 22–29)
CREAT SERPL-MCNC: 1.29 MG/DL (ref 0.76–1.27)
DEPRECATED RDW RBC AUTO: 46.7 FL (ref 37–54)
DEPRECATED RDW RBC AUTO: 47.2 FL (ref 37–54)
EGFRCR SERPLBLD CKD-EPI 2021: 56.8 ML/MIN/1.73
EOSINOPHIL # BLD AUTO: 0.28 10*3/MM3 (ref 0–0.4)
EOSINOPHIL NFR BLD AUTO: 4.1 % (ref 0.3–6.2)
ERYTHROCYTE [DISTWIDTH] IN BLOOD BY AUTOMATED COUNT: 14.6 % (ref 12.3–15.4)
ERYTHROCYTE [DISTWIDTH] IN BLOOD BY AUTOMATED COUNT: 14.9 % (ref 12.3–15.4)
FLUAV SUBTYP SPEC NAA+PROBE: NOT DETECTED
FLUBV RNA ISLT QL NAA+PROBE: NOT DETECTED
GLUCOSE SERPL-MCNC: 93 MG/DL (ref 65–99)
HCT VFR BLD AUTO: 26.6 % (ref 37.5–51)
HCT VFR BLD AUTO: 27.8 % (ref 37.5–51)
HGB BLD-MCNC: 8.7 G/DL (ref 13–17.7)
HGB BLD-MCNC: 9.2 G/DL (ref 13–17.7)
IMM GRANULOCYTES # BLD AUTO: 0.03 10*3/MM3 (ref 0–0.05)
IMM GRANULOCYTES NFR BLD AUTO: 0.4 % (ref 0–0.5)
LYMPHOCYTES # BLD AUTO: 1.16 10*3/MM3 (ref 0.7–3.1)
LYMPHOCYTES NFR BLD AUTO: 16.8 % (ref 19.6–45.3)
MAGNESIUM SERPL-MCNC: 2.1 MG/DL (ref 1.6–2.4)
MCH RBC QN AUTO: 29.1 PG (ref 26.6–33)
MCH RBC QN AUTO: 29.4 PG (ref 26.6–33)
MCHC RBC AUTO-ENTMCNC: 32.7 G/DL (ref 31.5–35.7)
MCHC RBC AUTO-ENTMCNC: 33.1 G/DL (ref 31.5–35.7)
MCV RBC AUTO: 88.8 FL (ref 79–97)
MCV RBC AUTO: 89 FL (ref 79–97)
MONOCYTES # BLD AUTO: 0.48 10*3/MM3 (ref 0.1–0.9)
MONOCYTES NFR BLD AUTO: 7 % (ref 5–12)
NEUTROPHILS NFR BLD AUTO: 4.92 10*3/MM3 (ref 1.7–7)
NEUTROPHILS NFR BLD AUTO: 71.4 % (ref 42.7–76)
NRBC BLD AUTO-RTO: 0 /100 WBC (ref 0–0.2)
PHOSPHATE SERPL-MCNC: 2.7 MG/DL (ref 2.5–4.5)
PLATELET # BLD AUTO: 300 10*3/MM3 (ref 140–450)
PLATELET # BLD AUTO: 311 10*3/MM3 (ref 140–450)
PMV BLD AUTO: 10 FL (ref 6–12)
PMV BLD AUTO: 9.7 FL (ref 6–12)
POTASSIUM SERPL-SCNC: 4.1 MMOL/L (ref 3.5–5.2)
RBC # BLD AUTO: 2.99 10*6/MM3 (ref 4.14–5.8)
RBC # BLD AUTO: 3.13 10*6/MM3 (ref 4.14–5.8)
RSV RNA NPH QL NAA+NON-PROBE: NOT DETECTED
SARS-COV-2 RNA RESP QL NAA+PROBE: NOT DETECTED
SODIUM SERPL-SCNC: 134 MMOL/L (ref 136–145)
WBC NRBC COR # BLD AUTO: 6.89 10*3/MM3 (ref 3.4–10.8)
WBC NRBC COR # BLD AUTO: 7.87 10*3/MM3 (ref 3.4–10.8)

## 2024-09-27 PROCEDURE — 80048 BASIC METABOLIC PNL TOTAL CA: CPT | Performed by: STUDENT IN AN ORGANIZED HEALTH CARE EDUCATION/TRAINING PROGRAM

## 2024-09-27 PROCEDURE — 87637 SARSCOV2&INF A&B&RSV AMP PRB: CPT | Performed by: STUDENT IN AN ORGANIZED HEALTH CARE EDUCATION/TRAINING PROGRAM

## 2024-09-27 PROCEDURE — 85027 COMPLETE CBC AUTOMATED: CPT | Performed by: STUDENT IN AN ORGANIZED HEALTH CARE EDUCATION/TRAINING PROGRAM

## 2024-09-27 PROCEDURE — 85025 COMPLETE CBC W/AUTO DIFF WBC: CPT | Performed by: STUDENT IN AN ORGANIZED HEALTH CARE EDUCATION/TRAINING PROGRAM

## 2024-09-27 PROCEDURE — 83735 ASSAY OF MAGNESIUM: CPT | Performed by: STUDENT IN AN ORGANIZED HEALTH CARE EDUCATION/TRAINING PROGRAM

## 2024-09-27 PROCEDURE — 84100 ASSAY OF PHOSPHORUS: CPT | Performed by: STUDENT IN AN ORGANIZED HEALTH CARE EDUCATION/TRAINING PROGRAM

## 2024-09-27 PROCEDURE — 99232 SBSQ HOSP IP/OBS MODERATE 35: CPT | Performed by: STUDENT IN AN ORGANIZED HEALTH CARE EDUCATION/TRAINING PROGRAM

## 2024-09-27 RX ADMIN — LOSARTAN POTASSIUM 50 MG: 50 TABLET ORAL at 09:05

## 2024-09-27 RX ADMIN — PANTOPRAZOLE SODIUM 40 MG: 40 TABLET, DELAYED RELEASE ORAL at 09:04

## 2024-09-27 RX ADMIN — LEVOTHYROXINE SODIUM 137 MCG: 0.11 TABLET ORAL at 09:04

## 2024-09-27 RX ADMIN — LOSARTAN POTASSIUM 50 MG: 50 TABLET ORAL at 21:04

## 2024-09-27 RX ADMIN — Medication 10 ML: at 09:09

## 2024-09-27 RX ADMIN — Medication 5 MG: at 22:44

## 2024-09-27 RX ADMIN — AMLODIPINE BESYLATE 5 MG: 5 TABLET ORAL at 09:04

## 2024-09-27 RX ADMIN — MEMANTINE 5 MG: 5 TABLET ORAL at 09:04

## 2024-09-27 RX ADMIN — FUROSEMIDE 10 MG: 20 TABLET ORAL at 09:04

## 2024-09-27 RX ADMIN — FINASTERIDE 5 MG: 5 TABLET, FILM COATED ORAL at 09:05

## 2024-09-27 RX ADMIN — HYDROCODONE BITARTRATE AND ACETAMINOPHEN 1 TABLET: 7.5; 325 TABLET ORAL at 21:05

## 2024-09-27 RX ADMIN — FERROUS SULFATE TAB 325 MG (65 MG ELEMENTAL FE) 325 MG: 325 (65 FE) TAB at 09:05

## 2024-09-27 RX ADMIN — TICAGRELOR 90 MG: 90 TABLET ORAL at 10:47

## 2024-09-27 RX ADMIN — TICAGRELOR 90 MG: 90 TABLET ORAL at 21:05

## 2024-09-27 RX ADMIN — METOPROLOL SUCCINATE 50 MG: 50 TABLET, EXTENDED RELEASE ORAL at 09:04

## 2024-09-27 RX ADMIN — MEMANTINE 5 MG: 5 TABLET ORAL at 21:05

## 2024-09-27 RX ADMIN — Medication 10 ML: at 21:05

## 2024-09-27 RX ADMIN — SENNOSIDES AND DOCUSATE SODIUM 2 TABLET: 50; 8.6 TABLET ORAL at 21:04

## 2024-09-27 NOTE — PLAN OF CARE
Goal Outcome Evaluation:  Plan of Care Reviewed With: patient PT A&O times 4 able to voice needs and wants denies pain cont on CBI with light red pink urine cont POC

## 2024-09-27 NOTE — PROGRESS NOTES
[facility]     Progress Note    Patient Name: Dixon Amador  : 1945  MRN: [id number]  Primary Care Physician:  Shayy Galaviz APRN  Date of admission: 2024    Subjective   Subjective     Chief Complaint: No complaints today    Blood in Urine      Patient Reports patient a few days ago went into urinary retention had a Almeida catheter placed but because of 2 anticoagulants he started having gross hematuria with clot urinary retention.  He was taken to the operating room where the blood clots were removed from bladder and continuous bladder irrigation was started.  Today he is doing well.    Review of Systems   Genitourinary:  Positive for hematuria.       Objective   Objective     Vitals:   Temp:  [98.1 °F (36.7 °C)-99.1 °F (37.3 °C)] 98.1 °F (36.7 °C)  Heart Rate:  [63-72] 63  Resp:  [18-22] 18  BP: (116-155)/(66-80) 155/68    Physical Exam   Awake alert oriented x 3  Afebrile vital signs are stable  Abdomen is soft and benign  Almeida catheter is in continuous bladder irrigation is ongoing with reflux being very light pink.  Result Review    Result Review:  I have personally reviewed the results from the time of this admission to 2024 09:41 EDT and agree with these findings:  [x]  Laboratory list / accordion  []  Microbiology  []  Radiology  []  EKG/Telemetry   []  Cardiology/Vascular   []  Pathology  []  Old records  []  Other:  Most notable findings include: Stable      Assessment & Plan   Assessment / Plan     Brief Patient Summary:  Dixon Amador is a 78 y.o. male who gross hematuria with clot urinary retention.    Active Hospital Problems:  Active Hospital Problems    Diagnosis     **Gross hematuria     Hematuria      Plan:   If patient continues to not have any more blood clots and his urine remains fairly clear we can plan to discharge him home tomorrow with the catheter and he can follow-up with the urology clinic to be evaluated for possible TURP.    VTE Prophylaxis:  Pharmacologic  & mechanical VTE prophylaxis orders are present.        CODE STATUS:    Level Of Support Discussed With: Patient  Code Status (Patient has no pulse and is not breathing): CPR (Attempt to Resuscitate)  Medical Interventions (Patient has pulse or is breathing): Full Support    Disposition:  I expect patient to be discharged home.    Derian Pineda MD

## 2024-09-27 NOTE — PROGRESS NOTES
[facility]   Hospitalist Progress Note  Date: 2024  Patient Name: Dixon Amador  : 1945  MRN: [id number]  Date of admission: 2024  Room/Bed: [facility]      Subjective   Subjective     Chief Complaint: Gross hematuria     Summary:Dixon Amador is a 78 y.o. male  with past medical history of hypertension, coronary artery disease status post PCI 2024 on Brilinta, atrial fibrillation on Eliquis, carotid artery stenosis, hypothyroidism who presented to emergency room with hematuria. Urology was consulted and patient underwent Cystoscopy with clot evacuation with fulguration of vleeding vessels on presentation on .     Patient was in the hospital for a similar issue from -. During that hospitalization, antiplatelet/anticoagulation was held briefly. Urology consulted.  Three-way catheter placed with continuous bladder irrigation with resolution of hematuria was ordered.  He completed empiric course of Rocephin. He was restarted on Brilinta and Eliquis. Failed voiding trial, was discharged home with Almeida catheter, Flomax and Finasteride with close urology follow-up planned for TURP consideration pending cardiology clearance. Admitted for further evaluation and management. Urology on board.    Interval Followup: No acute events overnight. Hb stable today. Stated his urine still looks reddish but slowly improving. Denied any fever, chills, rigors, chest pain or SOA.    Review of Systems    All systems reviewed and negative except for what is outlined above.      Objective   Objective     Vitals:   Temp:  [98.1 °F (36.7 °C)-99.1 °F (37.3 °C)] 98.1 °F (36.7 °C)  Heart Rate:  [63-72] 63  Resp:  [18-22] 18  BP: (116-155)/(66-80) 155/68    Physical Exam   General: Awake, alert, NAD  Cardiovascular: RRR, no murmurs   Pulmonary: CTA bilaterally; no wheezes; no conversational dyspnea  Gastrointestinal: S/ND/NT, +BS    Result Review    Result Review:  I have personally reviewed these  results:  [x]  Laboratory      Lab 09/27/24  0429 09/26/24  1414 09/26/24  0443 09/25/24  1114   WBC 6.89  --  8.48 4.74   HEMOGLOBIN 8.7* 9.3* 8.9* 10.4*   HEMATOCRIT 26.6* 28.5* 27.4* 31.7*   PLATELETS 300  --  294 333   NEUTROS ABS 4.92  --  7.01* 3.08   IMMATURE GRANS (ABS) 0.03  --  0.03 0.02   LYMPHS ABS 1.16  --  0.68* 0.98   MONOS ABS 0.48  --  0.53 0.39   EOS ABS 0.28  --  0.20 0.25   MCV 89.0  --  87.3 86.8         Lab 09/27/24  0429 09/26/24  0443 09/25/24  1827 09/25/24  1114   SODIUM 134* 133*  --  138   POTASSIUM 4.1 3.9  --  3.8   CHLORIDE 100 99  --  102   CO2 27.5 25.4  --  28.3   ANION GAP 6.5 8.6  --  7.7   BUN 15 15  --  15   CREATININE 1.29* 1.14  --  1.16   EGFR 56.8* 65.8  --  64.5   GLUCOSE 93 110*  --  102*   CALCIUM 8.4* 8.3*  --  8.9   MAGNESIUM 2.1 1.3* 1.6  --    PHOSPHORUS 2.7 3.1 3.1  --          Lab 09/25/24  1114   TOTAL PROTEIN 6.3   ALBUMIN 3.4*   GLOBULIN 2.9   ALT (SGPT) 67*   AST (SGOT) 57*   BILIRUBIN 0.4   ALK PHOS 144*                   Lab 09/25/24 1827   ABO TYPING O   RH TYPING Positive   ANTIBODY SCREEN Negative         Brief Urine Lab Results       None          [x]  Microbiology   Microbiology Results (last 10 days)       ** No results found for the last 240 hours. **          [x]  Radiology  No radiology results for the last 7 days  []  EKG/Telemetry   []  Cardiology/Vascular   []  Pathology  []  Old records  []  Other:    Assessment & Plan   Assessment / Plan     Assessment:  Gross hematuria  Severe prostatomegaly on CT  Urinary retention  UTI from unspecified organism  Paroxysmal atrial fibrillation on Eliquis  CAD with previous PCI  Peripheral vascular disease  Severe right ICA stenosis   Hypertension  Hypothyroidism  Iron deficiency anemia    Plan:  Patient is being managed in medicine service.  patient underwent Cystoscopy with clot evacuation with fulguration of bleeding vessels on presentation on 09/25.  Hematuria thought secondary to anticoagulation and galvan  catheter.  Holding off anticoagulation.  Patient to follow up with Dr. Oviedo or Dr. Sna as outpatient upon discharge.  Hb stable.  Started patient back on Brilinta given his PCI for CAD on 02/2024. Will monitor Hb closely.  Continue rest of the current management.  PT/OT.  Likely discharge to [facility] SNF tomorrow.     Discussed with RN.    VTE Prophylaxis:  Pharmacologic & mechanical VTE prophylaxis orders are present.        CODE STATUS:   Level Of Support Discussed With: Patient  Code Status (Patient has no pulse and is not breathing): CPR (Attempt to Resuscitate)  Medical Interventions (Patient has pulse or is breathing): Full Support      Electronically signed by Drea Rodriguez MD, 09/27/24, 8:24 AM EDT.

## 2024-09-27 NOTE — PLAN OF CARE
Goal Outcome Evaluation:              Outcome Evaluation: pt alert and oriented. cbi very slow drip. pt to dc to snf tomorrow.

## 2024-09-28 ENCOUNTER — HOSPITAL ENCOUNTER (INPATIENT)
Facility: HOSPITAL | Age: 79
LOS: 5 days | End: 2024-10-03
Attending: STUDENT IN AN ORGANIZED HEALTH CARE EDUCATION/TRAINING PROGRAM
Payer: MEDICARE

## 2024-09-28 VITALS
WEIGHT: 136.24 LBS | HEIGHT: 68 IN | RESPIRATION RATE: 16 BRPM | SYSTOLIC BLOOD PRESSURE: 130 MMHG | TEMPERATURE: 98.1 F | BODY MASS INDEX: 20.65 KG/M2 | HEART RATE: 65 BPM | DIASTOLIC BLOOD PRESSURE: 69 MMHG | OXYGEN SATURATION: 96 %

## 2024-09-28 DIAGNOSIS — Z78.9 DECREASED ACTIVITIES OF DAILY LIVING (ADL): ICD-10-CM

## 2024-09-28 DIAGNOSIS — R26.2 DIFFICULTY WALKING: Primary | ICD-10-CM

## 2024-09-28 LAB
ANION GAP SERPL CALCULATED.3IONS-SCNC: 7 MMOL/L (ref 5–15)
BASOPHILS # BLD AUTO: 0.02 10*3/MM3 (ref 0–0.2)
BASOPHILS NFR BLD AUTO: 0.3 % (ref 0–1.5)
BUN SERPL-MCNC: 16 MG/DL (ref 8–23)
BUN/CREAT SERPL: 12 (ref 7–25)
CALCIUM SPEC-SCNC: 8.9 MG/DL (ref 8.6–10.5)
CHLORIDE SERPL-SCNC: 98 MMOL/L (ref 98–107)
CO2 SERPL-SCNC: 27 MMOL/L (ref 22–29)
CREAT SERPL-MCNC: 1.33 MG/DL (ref 0.76–1.27)
DEPRECATED RDW RBC AUTO: 47.1 FL (ref 37–54)
EGFRCR SERPLBLD CKD-EPI 2021: 54.7 ML/MIN/1.73
EOSINOPHIL # BLD AUTO: 0.26 10*3/MM3 (ref 0–0.4)
EOSINOPHIL NFR BLD AUTO: 3.4 % (ref 0.3–6.2)
ERYTHROCYTE [DISTWIDTH] IN BLOOD BY AUTOMATED COUNT: 14.7 % (ref 12.3–15.4)
GLUCOSE SERPL-MCNC: 116 MG/DL (ref 65–99)
HCT VFR BLD AUTO: 26.4 % (ref 37.5–51)
HGB BLD-MCNC: 8.7 G/DL (ref 13–17.7)
IMM GRANULOCYTES # BLD AUTO: 0.08 10*3/MM3 (ref 0–0.05)
IMM GRANULOCYTES NFR BLD AUTO: 1 % (ref 0–0.5)
IRON 24H UR-MRATE: 25 MCG/DL (ref 59–158)
IRON SATN MFR SERPL: 9 % (ref 20–50)
LYMPHOCYTES # BLD AUTO: 1.32 10*3/MM3 (ref 0.7–3.1)
LYMPHOCYTES NFR BLD AUTO: 17.3 % (ref 19.6–45.3)
MAGNESIUM SERPL-MCNC: 1.9 MG/DL (ref 1.6–2.4)
MCH RBC QN AUTO: 29.2 PG (ref 26.6–33)
MCHC RBC AUTO-ENTMCNC: 33 G/DL (ref 31.5–35.7)
MCV RBC AUTO: 88.6 FL (ref 79–97)
MONOCYTES # BLD AUTO: 0.51 10*3/MM3 (ref 0.1–0.9)
MONOCYTES NFR BLD AUTO: 6.7 % (ref 5–12)
NEUTROPHILS NFR BLD AUTO: 5.44 10*3/MM3 (ref 1.7–7)
NEUTROPHILS NFR BLD AUTO: 71.3 % (ref 42.7–76)
NRBC BLD AUTO-RTO: 0 /100 WBC (ref 0–0.2)
PHOSPHATE SERPL-MCNC: 3.4 MG/DL (ref 2.5–4.5)
PLATELET # BLD AUTO: 315 10*3/MM3 (ref 140–450)
PMV BLD AUTO: 9.8 FL (ref 6–12)
POTASSIUM SERPL-SCNC: 4.2 MMOL/L (ref 3.5–5.2)
RBC # BLD AUTO: 2.98 10*6/MM3 (ref 4.14–5.8)
SODIUM SERPL-SCNC: 132 MMOL/L (ref 136–145)
TIBC SERPL-MCNC: 267 MCG/DL (ref 298–536)
TRANSFERRIN SERPL-MCNC: 179 MG/DL (ref 200–360)
WBC NRBC COR # BLD AUTO: 7.63 10*3/MM3 (ref 3.4–10.8)

## 2024-09-28 PROCEDURE — 84100 ASSAY OF PHOSPHORUS: CPT | Performed by: STUDENT IN AN ORGANIZED HEALTH CARE EDUCATION/TRAINING PROGRAM

## 2024-09-28 PROCEDURE — 83540 ASSAY OF IRON: CPT | Performed by: STUDENT IN AN ORGANIZED HEALTH CARE EDUCATION/TRAINING PROGRAM

## 2024-09-28 PROCEDURE — 83735 ASSAY OF MAGNESIUM: CPT | Performed by: STUDENT IN AN ORGANIZED HEALTH CARE EDUCATION/TRAINING PROGRAM

## 2024-09-28 PROCEDURE — 80048 BASIC METABOLIC PNL TOTAL CA: CPT | Performed by: STUDENT IN AN ORGANIZED HEALTH CARE EDUCATION/TRAINING PROGRAM

## 2024-09-28 PROCEDURE — 85025 COMPLETE CBC W/AUTO DIFF WBC: CPT | Performed by: STUDENT IN AN ORGANIZED HEALTH CARE EDUCATION/TRAINING PROGRAM

## 2024-09-28 PROCEDURE — 84466 ASSAY OF TRANSFERRIN: CPT | Performed by: STUDENT IN AN ORGANIZED HEALTH CARE EDUCATION/TRAINING PROGRAM

## 2024-09-28 PROCEDURE — 99239 HOSP IP/OBS DSCHRG MGMT >30: CPT | Performed by: STUDENT IN AN ORGANIZED HEALTH CARE EDUCATION/TRAINING PROGRAM

## 2024-09-28 RX ORDER — LEVOTHYROXINE SODIUM 137 UG/1
137 TABLET ORAL
Status: DISCONTINUED | OUTPATIENT
Start: 2024-09-29 | End: 2024-10-03 | Stop reason: HOSPADM

## 2024-09-28 RX ORDER — MEMANTINE HYDROCHLORIDE 5 MG/1
5 TABLET ORAL 2 TIMES DAILY
Status: DISCONTINUED | OUTPATIENT
Start: 2024-09-28 | End: 2024-10-03 | Stop reason: HOSPADM

## 2024-09-28 RX ORDER — FUROSEMIDE 20 MG
10 TABLET ORAL DAILY
Status: CANCELLED | OUTPATIENT
Start: 2024-09-29

## 2024-09-28 RX ORDER — POLYETHYLENE GLYCOL 3350 17 G/17G
17 POWDER, FOR SOLUTION ORAL DAILY PRN
Status: CANCELLED | OUTPATIENT
Start: 2024-09-28

## 2024-09-28 RX ORDER — LOSARTAN POTASSIUM 50 MG/1
50 TABLET ORAL 2 TIMES DAILY
Status: DISCONTINUED | OUTPATIENT
Start: 2024-09-28 | End: 2024-10-03 | Stop reason: HOSPADM

## 2024-09-28 RX ORDER — LOSARTAN POTASSIUM 50 MG/1
50 TABLET ORAL 2 TIMES DAILY
Status: CANCELLED | OUTPATIENT
Start: 2024-09-28

## 2024-09-28 RX ORDER — MEMANTINE HYDROCHLORIDE 5 MG/1
5 TABLET ORAL 2 TIMES DAILY
Status: CANCELLED | OUTPATIENT
Start: 2024-09-28

## 2024-09-28 RX ORDER — BISACODYL 10 MG
10 SUPPOSITORY, RECTAL RECTAL DAILY PRN
Status: CANCELLED | OUTPATIENT
Start: 2024-09-28

## 2024-09-28 RX ORDER — SODIUM CHLORIDE 0.9 % (FLUSH) 0.9 %
10 SYRINGE (ML) INJECTION EVERY 12 HOURS SCHEDULED
Status: DISCONTINUED | OUTPATIENT
Start: 2024-09-28 | End: 2024-10-03 | Stop reason: HOSPADM

## 2024-09-28 RX ORDER — TAMSULOSIN HYDROCHLORIDE 0.4 MG/1
0.4 CAPSULE ORAL DAILY
Status: DISCONTINUED | OUTPATIENT
Start: 2024-09-29 | End: 2024-10-03 | Stop reason: HOSPADM

## 2024-09-28 RX ORDER — METOPROLOL SUCCINATE 50 MG/1
50 TABLET, EXTENDED RELEASE ORAL DAILY
Status: CANCELLED | OUTPATIENT
Start: 2024-09-29

## 2024-09-28 RX ORDER — SODIUM CHLORIDE 0.9 % (FLUSH) 0.9 %
10 SYRINGE (ML) INJECTION EVERY 12 HOURS SCHEDULED
Status: CANCELLED | OUTPATIENT
Start: 2024-09-28

## 2024-09-28 RX ORDER — BISACODYL 5 MG/1
5 TABLET, DELAYED RELEASE ORAL DAILY PRN
Status: DISCONTINUED | OUTPATIENT
Start: 2024-09-28 | End: 2024-10-03 | Stop reason: HOSPADM

## 2024-09-28 RX ORDER — BISACODYL 10 MG
10 SUPPOSITORY, RECTAL RECTAL DAILY PRN
Status: DISCONTINUED | OUTPATIENT
Start: 2024-09-28 | End: 2024-10-03 | Stop reason: HOSPADM

## 2024-09-28 RX ORDER — BISACODYL 5 MG/1
5 TABLET, DELAYED RELEASE ORAL DAILY PRN
Status: CANCELLED | OUTPATIENT
Start: 2024-09-28

## 2024-09-28 RX ORDER — AMOXICILLIN 250 MG
2 CAPSULE ORAL 2 TIMES DAILY
Status: DISCONTINUED | OUTPATIENT
Start: 2024-09-28 | End: 2024-10-03 | Stop reason: HOSPADM

## 2024-09-28 RX ORDER — FINASTERIDE 5 MG/1
5 TABLET, FILM COATED ORAL DAILY
Status: DISCONTINUED | OUTPATIENT
Start: 2024-09-29 | End: 2024-10-03 | Stop reason: HOSPADM

## 2024-09-28 RX ORDER — FERROUS SULFATE 325(65) MG
325 TABLET ORAL
Status: CANCELLED | OUTPATIENT
Start: 2024-09-29

## 2024-09-28 RX ORDER — FERROUS SULFATE 325(65) MG
325 TABLET ORAL
Status: DISCONTINUED | OUTPATIENT
Start: 2024-09-29 | End: 2024-10-03 | Stop reason: HOSPADM

## 2024-09-28 RX ORDER — METOPROLOL SUCCINATE 50 MG/1
50 TABLET, EXTENDED RELEASE ORAL DAILY
Status: DISCONTINUED | OUTPATIENT
Start: 2024-09-29 | End: 2024-10-01

## 2024-09-28 RX ORDER — FINASTERIDE 5 MG/1
5 TABLET, FILM COATED ORAL DAILY
Status: CANCELLED | OUTPATIENT
Start: 2024-09-29 | End: 2024-10-24

## 2024-09-28 RX ORDER — TAMSULOSIN HYDROCHLORIDE 0.4 MG/1
0.4 CAPSULE ORAL DAILY
Status: CANCELLED | OUTPATIENT
Start: 2024-09-29 | End: 2024-10-24

## 2024-09-28 RX ORDER — POLYETHYLENE GLYCOL 3350 17 G/17G
17 POWDER, FOR SOLUTION ORAL DAILY PRN
Status: DISCONTINUED | OUTPATIENT
Start: 2024-09-28 | End: 2024-10-03 | Stop reason: HOSPADM

## 2024-09-28 RX ORDER — PANTOPRAZOLE SODIUM 40 MG/1
40 TABLET, DELAYED RELEASE ORAL DAILY
Status: DISCONTINUED | OUTPATIENT
Start: 2024-09-29 | End: 2024-10-03 | Stop reason: HOSPADM

## 2024-09-28 RX ORDER — PANTOPRAZOLE SODIUM 40 MG/1
40 TABLET, DELAYED RELEASE ORAL DAILY
Status: CANCELLED | OUTPATIENT
Start: 2024-09-29

## 2024-09-28 RX ORDER — AMLODIPINE BESYLATE 5 MG/1
5 TABLET ORAL
Status: DISCONTINUED | OUTPATIENT
Start: 2024-09-29 | End: 2024-09-30

## 2024-09-28 RX ORDER — AMOXICILLIN 250 MG
2 CAPSULE ORAL 2 TIMES DAILY
Status: CANCELLED | OUTPATIENT
Start: 2024-09-28

## 2024-09-28 RX ORDER — AMLODIPINE BESYLATE 5 MG/1
5 TABLET ORAL
Status: CANCELLED | OUTPATIENT
Start: 2024-09-29 | End: 2024-10-24

## 2024-09-28 RX ORDER — FUROSEMIDE 20 MG/1
10 TABLET ORAL DAILY
Status: DISCONTINUED | OUTPATIENT
Start: 2024-09-29 | End: 2024-10-03 | Stop reason: HOSPADM

## 2024-09-28 RX ADMIN — FUROSEMIDE 10 MG: 20 TABLET ORAL at 09:15

## 2024-09-28 RX ADMIN — MEMANTINE 5 MG: 5 TABLET ORAL at 09:17

## 2024-09-28 RX ADMIN — LEVOTHYROXINE SODIUM 137 MCG: 0.11 TABLET ORAL at 09:14

## 2024-09-28 RX ADMIN — TICAGRELOR 90 MG: 90 TABLET ORAL at 09:16

## 2024-09-28 RX ADMIN — LOSARTAN POTASSIUM 50 MG: 50 TABLET, FILM COATED ORAL at 20:10

## 2024-09-28 RX ADMIN — METOPROLOL SUCCINATE 50 MG: 50 TABLET, EXTENDED RELEASE ORAL at 09:14

## 2024-09-28 RX ADMIN — MEMANTINE 5 MG: 5 TABLET ORAL at 20:11

## 2024-09-28 RX ADMIN — LOSARTAN POTASSIUM 50 MG: 50 TABLET ORAL at 09:16

## 2024-09-28 RX ADMIN — AMLODIPINE BESYLATE 5 MG: 5 TABLET ORAL at 09:17

## 2024-09-28 RX ADMIN — PANTOPRAZOLE SODIUM 40 MG: 40 TABLET, DELAYED RELEASE ORAL at 09:17

## 2024-09-28 RX ADMIN — FINASTERIDE 5 MG: 5 TABLET, FILM COATED ORAL at 09:17

## 2024-09-28 RX ADMIN — TICAGRELOR 90 MG: 90 TABLET ORAL at 20:11

## 2024-09-28 RX ADMIN — FERROUS SULFATE TAB 325 MG (65 MG ELEMENTAL FE) 325 MG: 325 (65 FE) TAB at 09:17

## 2024-09-28 RX ADMIN — TUBERCULIN PURIFIED PROTEIN DERIVATIVE 5 UNITS: 5 INJECTION, SOLUTION INTRADERMAL at 20:11

## 2024-09-28 RX ADMIN — Medication 10 ML: at 09:18

## 2024-09-28 NOTE — PROGRESS NOTES
[facility]     Progress Note    Patient Name: Dixon Amador  : 1945  MRN: [id number]  Primary Care Physician:  Shayy Galaviz APRN  Date of admission: 2024    Subjective   Subjective     Chief Complaint: Complaints today    Blood in Urine      Patient Reports patient has been doing well with continuous bladder irrigation    Review of Systems   Genitourinary:  Positive for hematuria.       Objective   Objective     Vitals:   Temp:  [97.5 °F (36.4 °C)-98.8 °F (37.1 °C)] 97.5 °F (36.4 °C)  Heart Rate:  [58-72] 61  Resp:  [16-18] 16  BP: (118-154)/(64-76) 125/69    Physical Exam   Awake alert oriented  Afebrile vital signs are stable  Almeida catheter is in place urine is clear  Result Review    Result Review:  I have personally reviewed the results from the time of this admission to 2024 11:49 EDT and agree with these findings:  [x]  Laboratory list / accordion  []  Microbiology  []  Radiology  []  EKG/Telemetry   []  Cardiology/Vascular   []  Pathology  []  Old records  []  Other:  Most notable findings include: Stable      Assessment & Plan   Assessment / Plan     Brief Patient Summary:  Dixon Amador is a 78 y.o. male who had gross hematuria    Active Hospital Problems:  Active Hospital Problems    Diagnosis     **Gross hematuria     Hematuria      Plan:   Patient is doing well the urine is clear I recommend we stop the continuous bladder irrigation and patient can go home and follow-up with the urology clinic next week.    VTE Prophylaxis:  Pharmacologic & mechanical VTE prophylaxis orders are present.        CODE STATUS:    Level Of Support Discussed With: Patient  Code Status (Patient has no pulse and is not breathing): CPR (Attempt to Resuscitate)  Medical Interventions (Patient has pulse or is breathing): Full Support    Disposition:  I expect patient to be discharged home.    Derian Pineda MD

## 2024-09-28 NOTE — PLAN OF CARE
Goal Outcome Evaluation:  Plan of Care Reviewed With: patient        Progress: improving  Outcome Evaluation: pt alert and oriented, cbi discontinued, galvan catheter remain in place, draining pink clear urine.  pt discharging to SNIF.

## 2024-09-28 NOTE — PLAN OF CARE
Goal Outcome Evaluation:   Pt slept well with CBI still giving a light pink color with little sediment. It does seem that there is some clots as it had to be irrigated twice last night. No other changes

## 2024-09-28 NOTE — DISCHARGE SUMMARY
[facility]         HOSPITALIST  DISCHARGE SUMMARY    Patient Name: Dixon Amador  : 1945  MRN: [id number]    Date of Admission: 2024  Date of Discharge:  2024  Primary Care Physician: Shayy Galaviz APRN    Consults       Date and Time Order Name Status Description    2024  1:11 PM Inpatient Hospitalist Consult      2024 12:42 PM Urology (on-call MD unless specified)      2024 11:58 AM IP General Consult (Use specialty-specific consult if known)              Active and Resolved Hospital Problems:  Active Hospital Problems    Diagnosis POA    **Gross hematuria [R31.0] Unknown    Hematuria [R31.9] Yes      Resolved Hospital Problems   No resolved problems to display.       Hospital Course     Hospital Course:  Dixon Amador is a 78 y.o. male with past medical history of hypertension, coronary artery disease status post PCI 2024 on Brilinta, atrial fibrillation on Eliquis, carotid artery stenosis, hypothyroidism who presented to emergency room with hematuria. Urology was consulted and patient underwent Cystoscopy with clot evacuation with fulguration of vleeding vessels on presentation on .     Patient was in the hospital for a similar issue from -. During that hospitalization, antiplatelet/anticoagulation was held briefly. Urology consulted.  Three-way catheter placed with continuous bladder irrigation with resolution of hematuria was ordered.  He completed empiric course of Rocephin. He was restarted on Brilinta and Eliquis. Failed voiding trial, was discharged home with Almeida catheter, Flomax and Finasteride with close urology follow-up planned for TURP consideration pending cardiology clearance. Admitted for further evaluation and management. Urology on board.  Patient's hematuria improved.  Anemia stable.  Also found to have iron deficiency anemia for which she was started on iron supplement.  Noticed to have no more blood clots in his  urine.  Urology followed the patient throughout the hospitalization and deemed him okay to be discharged from urology standpoint along with Almeida.  His continuous bladder irrigation was stopped prior to discharge.  Patient is being discharged to [facility] today.  He is stable at the time of discharge.  Monitor hemoglobin intermittently.  Monitor for hematuria.  Patient to follow-up with PCP after discharge from rehab within a week.  Follow-up with urology next week as outpatient.  Patient's Eliquis has been held but his Plavix has been continued given his recent PCI on 02/2024.  Follow-up with Dr. Galvez as outpatient regarding discussion about restarting Eliquis down the line.  Continue PT/OT.  Fall precautions.        DISCHARGE Follow Up Recommendations for labs and diagnostics: As mentioned above.      Day of Discharge     Vital Signs:  Temp:  [97.5 °F (36.4 °C)-98.4 °F (36.9 °C)] 97.5 °F (36.4 °C)  Heart Rate:  [58-68] 61  Resp:  [16] 16  BP: (125-154)/(64-76) 125/69  Physical Exam:   General: Awake, alert, NAD  Cardiovascular: RRR, no murmurs   Pulmonary: CTA bilaterally; no wheezes; no conversational dyspnea  Gastrointestinal: S/ND/NT, +BS     Discharge Details        Discharge Medications        Continue These Medications        Instructions Start Date   amLODIPine 5 MG tablet  Commonly known as: NORVASC   5 mg, Oral, Every 24 Hours Scheduled      ezetimibe 10 MG tablet  Commonly known as: ZETIA   10 mg, Oral, Daily      ferrous sulfate 325 (65 FE) MG tablet  Commonly known as: FerrouSul   325 mg, Oral, Daily With Breakfast      finasteride 5 MG tablet  Commonly known as: PROSCAR   5 mg, Oral, Daily      furosemide 20 MG tablet  Commonly known as: LASIX   10 mg, Oral, Daily      HYDROcodone-acetaminophen 7.5-325 MG per tablet  Commonly known as: NORCO   1 tablet, Oral, Every 6 Hours PRN      hydrOXYzine 50 MG tablet  Commonly known as: ATARAX   50 mg, Oral, Nightly PRN      levothyroxine 137 MCG  tablet  Commonly known as: SYNTHROID, LEVOTHROID   137 mcg, Oral, Daily      losartan 100 MG tablet  Commonly known as: COZAAR   50 mg, Oral, 2 Times Daily      memantine 5 MG tablet  Commonly known as: NAMENDA   5 mg, Oral, 2 Times Daily      metoprolol succinate XL 50 MG 24 hr tablet  Commonly known as: TOPROL-XL   50 mg, Oral, Daily      pantoprazole 40 MG EC tablet  Commonly known as: PROTONIX   40 mg, Oral, Daily      tamsulosin 0.4 MG capsule 24 hr capsule  Commonly known as: FLOMAX   0.4 mg, Oral, Daily      ticagrelor 90 MG tablet tablet  Commonly known as: Brilinta   90 mg, Oral, 2 Times Daily             Stop These Medications      apixaban 5 MG tablet tablet  Commonly known as: Eliquis     cephalexin 500 MG capsule  Commonly known as: KEFLEX              Allergies   Allergen Reactions    Atorvastatin Unknown - Low Severity    Codeine Unknown - Low Severity     causes delium  causes delium         Discharge Disposition:  Rehab Facility or Unit (DC - External)    Diet:  Hospital:  Diet Order   Procedures    Diet: Regular/House; Fluid Consistency: Thin (IDDSI 0)       Discharge Activity:       CODE STATUS:  Code Status and Medical Interventions: CPR (Attempt to Resuscitate); Full Support   Ordered at: 09/25/24 1403     Level Of Support Discussed With:    Patient     Code Status (Patient has no pulse and is not breathing):    CPR (Attempt to Resuscitate)     Medical Interventions (Patient has pulse or is breathing):    Full Support       Future Appointments   Date Time Provider Department [place]   10/28/2024  9:00 AM Miguel Angel Oviedo MD [facility]       Additional Instructions for the Follow-ups that You Need to Schedule       Discharge Follow-up with PCP   As directed       Currently Documented PCP:    Shayy Galaviz APRN    PCP Phone Number:    [phone]     Follow Up Details: In 3-7 days after being discharged from rehab; needs CBC and cemistreis trended during follow up.                 Pertinent  and/or Most Recent Results     PROCEDURES:   Cystoscopy with clot evacuation with fulguration of vleeding vessels on 09/25.     LAB RESULTS:      Lab 09/28/24  0444 09/27/24  1537 09/27/24  0429 09/26/24  1414 09/26/24  0443 09/25/24  1114   WBC 7.63 7.87 6.89  --  8.48 4.74   HEMOGLOBIN 8.7* 9.2* 8.7* 9.3* 8.9* 10.4*   HEMATOCRIT 26.4* 27.8* 26.6* 28.5* 27.4* 31.7*   PLATELETS 315 311 300  --  294 333   NEUTROS ABS 5.44  --  4.92  --  7.01* 3.08   IMMATURE GRANS (ABS) 0.08*  --  0.03  --  0.03 0.02   LYMPHS ABS 1.32  --  1.16  --  0.68* 0.98   MONOS ABS 0.51  --  0.48  --  0.53 0.39   EOS ABS 0.26  --  0.28  --  0.20 0.25   MCV 88.6 88.8 89.0  --  87.3 86.8         Lab 09/28/24  0444 09/27/24  0429 09/26/24  0443 09/25/24  1827 09/25/24  1114 09/22/24  0330   SODIUM 132* 134* 133*  --  138 137   POTASSIUM 4.2 4.1 3.9  --  3.8 3.6   CHLORIDE 98 100 99  --  102 101   CO2 27.0 27.5 25.4  --  28.3 27.1   ANION GAP 7.0 6.5 8.6  --  7.7 8.9   BUN 16 15 15  --  15 15   CREATININE 1.33* 1.29* 1.14  --  1.16 1.12   EGFR 54.7* 56.8* 65.8  --  64.5 67.2   GLUCOSE 116* 93 110*  --  102* 93   CALCIUM 8.9 8.4* 8.3*  --  8.9 8.3*   MAGNESIUM 1.9 2.1 1.3* 1.6  --   --    PHOSPHORUS 3.4 2.7 3.1 3.1  --   --          Lab 09/25/24  1114   TOTAL PROTEIN 6.3   ALBUMIN 3.4*   GLOBULIN 2.9   ALT (SGPT) 67*   AST (SGOT) 57*   BILIRUBIN 0.4   ALK PHOS 144*                 Lab 09/28/24  0444 09/25/24  1827   IRON 25*  --    IRON SATURATION (TSAT) 9*  --    TIBC 267*  --    TRANSFERRIN 179*  --    ABO TYPING  --  O   RH TYPING  --  Positive   ANTIBODY SCREEN  --  Negative         Brief Urine Lab Results       None          Microbiology Results (last 10 days)       Procedure Component Value - Date/Time    COVID PRE-OP / PRE-PROCEDURE SCREENING ORDER (NO ISOLATION) - Swab, Nasopharynx [[id number]]  (Normal) Collected: 09/27/24 1620    Lab Status: Final result Specimen: Swab from Nasopharynx Updated: 09/27/24 1700    Narrative:       The following orders were created for panel order COVID PRE-OP / PRE-PROCEDURE SCREENING ORDER (NO ISOLATION) - Swab, Nasopharynx.  Procedure                               Abnormality         Status                     ---------                               -----------         ------                     COVID-19, FLU A/B, RSV P...[[id number]]  Normal              Final result                 Please view results for these tests on the individual orders.    COVID-19, FLU A/B, RSV PCR 1 HR TAT - Swab, Nasopharynx [[id number]]  (Normal) Collected: 09/27/24 1620    Lab Status: Final result Specimen: Swab from Nasopharynx Updated: 09/27/24 1700     COVID19 Not Detected     Influenza A PCR Not Detected     Influenza B PCR Not Detected     RSV, PCR Not Detected    Narrative:      Fact sheet for providers: https://www.fda.gov/media/274220/download    Fact sheet for patients: https://www.fda.gov/media/114572/download    Test performed by PCR.            No radiology results for the last 7 days     Results for orders placed during the hospital encounter of 11/17/22    Duplex Carotid Ultrasound CAR    Interpretation Summary    Proximal right internal carotid artery severe stenosis (80 to 99%)..    No significant left carotid bifurcation stenosis.    There is bilateral carotid bulb plaque.    Vertebral flow is antegrade bilaterally.    Clinical and/or angiographic correlation is advised regarding the findings in the right internal carotid artery.      Results for orders placed during the hospital encounter of 11/17/22    Duplex Carotid Ultrasound CAR    Interpretation Summary    Proximal right internal carotid artery severe stenosis (80 to 99%)..    No significant left carotid bifurcation stenosis.    There is bilateral carotid bulb plaque.    Vertebral flow is antegrade bilaterally.    Clinical and/or angiographic correlation is advised regarding the findings in the right internal carotid artery.          Labs Pending at  Discharge:        Time spent on Discharge including face to face service:  35 minutes    Electronically signed by Drea Rodriguez MD, 09/28/24, 2:55 PM EDT.

## 2024-09-29 PROCEDURE — 99306 1ST NF CARE HIGH MDM 50: CPT | Performed by: PHYSICIAN ASSISTANT

## 2024-09-29 PROCEDURE — 97530 THERAPEUTIC ACTIVITIES: CPT

## 2024-09-29 PROCEDURE — 91320 SARSCV2 VAC 30MCG TRS-SUC IM: CPT | Performed by: STUDENT IN AN ORGANIZED HEALTH CARE EDUCATION/TRAINING PROGRAM

## 2024-09-29 PROCEDURE — 25010000002 COVID-19 MRNA VAC-TRIS(PFIZER) 30 MCG/0.3ML SUSPENSION PREFILLED SYRINGE: Performed by: STUDENT IN AN ORGANIZED HEALTH CARE EDUCATION/TRAINING PROGRAM

## 2024-09-29 PROCEDURE — 97161 PT EVAL LOW COMPLEX 20 MIN: CPT

## 2024-09-29 PROCEDURE — 97110 THERAPEUTIC EXERCISES: CPT

## 2024-09-29 RX ADMIN — MEMANTINE 5 MG: 5 TABLET ORAL at 09:26

## 2024-09-29 RX ADMIN — AMLODIPINE BESYLATE 5 MG: 5 TABLET ORAL at 09:26

## 2024-09-29 RX ADMIN — LOSARTAN POTASSIUM 50 MG: 50 TABLET, FILM COATED ORAL at 09:26

## 2024-09-29 RX ADMIN — FERROUS SULFATE TAB 325 MG (65 MG ELEMENTAL FE) 325 MG: 325 (65 FE) TAB at 09:26

## 2024-09-29 RX ADMIN — COVID-19 VACCINE, MRNA 0.3 ML: 0.04 INJECTION, SUSPENSION INTRAMUSCULAR at 10:47

## 2024-09-29 RX ADMIN — METOPROLOL SUCCINATE 50 MG: 50 TABLET, EXTENDED RELEASE ORAL at 09:26

## 2024-09-29 RX ADMIN — FINASTERIDE 5 MG: 5 TABLET, FILM COATED ORAL at 09:26

## 2024-09-29 RX ADMIN — TICAGRELOR 90 MG: 90 TABLET ORAL at 20:47

## 2024-09-29 RX ADMIN — LOSARTAN POTASSIUM 50 MG: 50 TABLET, FILM COATED ORAL at 20:47

## 2024-09-29 RX ADMIN — Medication 5 MG: at 20:46

## 2024-09-29 RX ADMIN — FUROSEMIDE 10 MG: 20 TABLET ORAL at 09:26

## 2024-09-29 RX ADMIN — LEVOTHYROXINE SODIUM 137 MCG: 137 TABLET ORAL at 08:05

## 2024-09-29 RX ADMIN — Medication 5 MG: at 02:46

## 2024-09-29 RX ADMIN — TAMSULOSIN HYDROCHLORIDE 0.4 MG: 0.4 CAPSULE ORAL at 09:26

## 2024-09-29 RX ADMIN — TICAGRELOR 90 MG: 90 TABLET ORAL at 09:26

## 2024-09-29 RX ADMIN — PANTOPRAZOLE SODIUM 40 MG: 40 TABLET, DELAYED RELEASE ORAL at 09:26

## 2024-09-29 RX ADMIN — MEMANTINE 5 MG: 5 TABLET ORAL at 20:47

## 2024-09-29 NOTE — PLAN OF CARE
Goal Outcome Evaluation:  Plan of Care Reviewed With: patient, spouse           Outcome Evaluation: Pt presents with decreased ambulatory and transfer function and safety due to diminished endurance and dynamic balance. Pt will benefit from skilled physical therapy to address deficits and improve quality of life.      Anticipated Discharge Disposition (PT): home with outpatient therapy services

## 2024-09-29 NOTE — PLAN OF CARE
Goal Outcome Evaluation:   Patient has had no new changes throughout shift and continues to remain stable. Patient has had no complaints of pain during shift. Patient worked with PT this morning. Patient is x1 assist with walker and gait belt. Patient's galvan remains intact. Patient's urine is pink with sediment.
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
## 2024-09-30 LAB
INDURATION: 0 MM (ref 0–10)
Lab: NORMAL
Lab: NORMAL
SARS-COV-2 RNA RESP QL NAA+PROBE: NOT DETECTED
TB SKIN TEST: NEGATIVE

## 2024-09-30 PROCEDURE — 97530 THERAPEUTIC ACTIVITIES: CPT

## 2024-09-30 PROCEDURE — 92610 EVALUATE SWALLOWING FUNCTION: CPT

## 2024-09-30 PROCEDURE — 87635 SARS-COV-2 COVID-19 AMP PRB: CPT | Performed by: PHYSICIAN ASSISTANT

## 2024-09-30 PROCEDURE — 97535 SELF CARE MNGMENT TRAINING: CPT

## 2024-09-30 PROCEDURE — 97110 THERAPEUTIC EXERCISES: CPT

## 2024-09-30 PROCEDURE — 97166 OT EVAL MOD COMPLEX 45 MIN: CPT

## 2024-09-30 PROCEDURE — 97116 GAIT TRAINING THERAPY: CPT

## 2024-09-30 RX ORDER — AMLODIPINE BESYLATE 5 MG/1
2.5 TABLET ORAL
Status: DISCONTINUED | OUTPATIENT
Start: 2024-10-01 | End: 2024-10-03 | Stop reason: HOSPADM

## 2024-09-30 RX ADMIN — LEVOTHYROXINE SODIUM 137 MCG: 137 TABLET ORAL at 08:47

## 2024-09-30 RX ADMIN — SENNOSIDES AND DOCUSATE SODIUM 2 TABLET: 50; 8.6 TABLET ORAL at 20:45

## 2024-09-30 RX ADMIN — TICAGRELOR 90 MG: 90 TABLET ORAL at 08:48

## 2024-09-30 RX ADMIN — AMLODIPINE BESYLATE 5 MG: 5 TABLET ORAL at 08:48

## 2024-09-30 RX ADMIN — FUROSEMIDE 10 MG: 20 TABLET ORAL at 08:48

## 2024-09-30 RX ADMIN — PANTOPRAZOLE SODIUM 40 MG: 40 TABLET, DELAYED RELEASE ORAL at 08:48

## 2024-09-30 RX ADMIN — METOPROLOL SUCCINATE 50 MG: 50 TABLET, EXTENDED RELEASE ORAL at 08:47

## 2024-09-30 RX ADMIN — FINASTERIDE 5 MG: 5 TABLET, FILM COATED ORAL at 08:47

## 2024-09-30 RX ADMIN — Medication 5 MG: at 21:31

## 2024-09-30 RX ADMIN — TAMSULOSIN HYDROCHLORIDE 0.4 MG: 0.4 CAPSULE ORAL at 08:47

## 2024-09-30 RX ADMIN — TICAGRELOR 90 MG: 90 TABLET ORAL at 20:46

## 2024-09-30 RX ADMIN — MEMANTINE 5 MG: 5 TABLET ORAL at 08:48

## 2024-09-30 RX ADMIN — MEMANTINE 5 MG: 5 TABLET ORAL at 20:46

## 2024-09-30 RX ADMIN — LOSARTAN POTASSIUM 50 MG: 50 TABLET, FILM COATED ORAL at 08:48

## 2024-09-30 RX ADMIN — FERROUS SULFATE TAB 325 MG (65 MG ELEMENTAL FE) 325 MG: 325 (65 FE) TAB at 08:47
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
## 2024-09-30 NOTE — THERAPY EVALUATION
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 adaptive and compensatory techniques for catheter management  education on Alme[place] hygiene  Education on OT services and benefits  education on fall risk prevention                                         User Key       Initials Effective Dates Name Provider Type Discipline    EG 09/14/22 -  Radha Yo OT Occupational Therapist OT                  OT Recommendation and Plan  Planned Therapy Interventions (OT): activity tolerance training, occupation/activity based interventions, functional balance retraining, adaptive equipment training, BADL retraining, neuromuscular control/coordination retraining, patient/caregiver education/training, transfer/mobility retraining, strengthening exercise  Therapy Frequency (OT): 5 times/wk  Plan of Care Review  Plan of Care Reviewed With: patient  Progress: no change  Outcome Evaluation: Patient is pleasant and cooperative; alert and oriented x 3 with reports of pain at [place] site with blood noted and nursing notified; patient demonstrates potential for return to prior level of function with participation in OT services and dressing decreased functional strength, balance, endurance and safety inside concerns.  OT will continue with current  plan of care, Ax1 wRW.     Time Calculation:   Evaluation Complexity (OT)  Review Occupational Profile/Medical/Therapy History Complexity: expanded/moderate complexity  Assessment, Occupational Performance/Identification of Deficit Complexity: 3-5 performance deficits  Clinical Decision Making Complexity (OT): detailed assessment/moderate complexity  Overall Complexity of Evaluation (OT): moderate complexity     Time Calculation- OT       Row Name 09/30/24 0824 09/30/24 0817          Time Calculation- OT    OT Received On 09/30/24  -EG 09/30/24  -EG     OT Goal Re-Cert Due Date 10/30/24  -EG 10/30/24  -EG        Timed Charges    11289 -  OT Neuromuscular Reeducation Minutes 5  -EG --     68132 - OT Therapeutic Activity Minutes 14  -EG --     88375 - OT Self Care/Mgmt Minutes 23  -EG --        Untimed Charges    OT Eval/Re-eval Minutes -- 34  -EG        SNF Occupational Therapy Minutes    Skilled Minutes- OT 42 min  -EG --        Total Minutes    Timed Charges Total Minutes 42  -EG --     Untimed Charges Total Minutes -- 34  -EG      Total Minutes 42  -EG 34  -EG               User Key  (r) = Recorded By, (t) = Taken By, (c) = Cosigned By      Initials Name Provider Type    EG Radha Yo, OT Occupational Therapist                  Therapy Charges for Today       Code Description Service Date Service Provider Modifiers Qty    06242097606 HC OT EVAL MOD COMPLEXITY 3 9/30/2024 Radha Yo OT GO 1    28394418705 HC OT THERAPEUTIC ACT EA 15 MIN 9/30/2024 Radha Yo OT GO 1    59081538955 HC OT SELF CARE/MGMT/TRAIN EA 15 MIN 9/30/2024 Radha Yo OT GO 2                 Radha Yo OT  9/30/2024

## 2024-09-30 NOTE — PLAN OF CARE
Goal Outcome Evaluation:           Progress: no change  Outcome Evaluation: AOx3, call light and personal items within reach. Able to make needs known to staff. No c/o pain during the shift. F/C remains patent. Postinal changes doen independently. Con't plan of care
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
## 2024-09-30 NOTE — PLAN OF CARE
Goal Outcome Evaluation:  Plan of Care Reviewed With: patient        Progress: no change  Outcome Evaluation: Alert and oriented X 3. Able to communicate needs. Repositioned self during night. Continues with galvan catheter. Urine pink and clear. Continue plan of care.
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
## 2024-09-30 NOTE — PLAN OF CARE
Goal Outcome Evaluation:  Plan of Care Reviewed With: patient        Progress: no change  Outcome Evaluation: Patient presents with limitations of decreased functional strength, balance, endurance and limited safety/insight into own deficits requiring need for skilled OT services to facilitate return to prior level of function with ADLs.  Patient demonstrates need for repetitive cues and education for new Almeida cath management and training.      Anticipated Discharge Disposition (OT): home with home health

## 2024-10-01 LAB
ALBUMIN SERPL-MCNC: 3.2 G/DL (ref 3.5–5.2)
ANION GAP SERPL CALCULATED.3IONS-SCNC: 9.3 MMOL/L (ref 5–15)
BUN SERPL-MCNC: 23 MG/DL (ref 8–23)
BUN/CREAT SERPL: 15.1 (ref 7–25)
CALCIUM SPEC-SCNC: 9 MG/DL (ref 8.6–10.5)
CHLORIDE SERPL-SCNC: 95 MMOL/L (ref 98–107)
CO2 SERPL-SCNC: 26.7 MMOL/L (ref 22–29)
CREAT SERPL-MCNC: 1.52 MG/DL (ref 0.76–1.27)
DEPRECATED RDW RBC AUTO: 47.8 FL (ref 37–54)
EGFRCR SERPLBLD CKD-EPI 2021: 46.6 ML/MIN/1.73
ERYTHROCYTE [DISTWIDTH] IN BLOOD BY AUTOMATED COUNT: 15 % (ref 12.3–15.4)
GLUCOSE SERPL-MCNC: 112 MG/DL (ref 65–99)
HCT VFR BLD AUTO: 28.7 % (ref 37.5–51)
HGB BLD-MCNC: 9.2 G/DL (ref 13–17.7)
MCH RBC QN AUTO: 28.2 PG (ref 26.6–33)
MCHC RBC AUTO-ENTMCNC: 32.1 G/DL (ref 31.5–35.7)
MCV RBC AUTO: 88 FL (ref 79–97)
NT-PROBNP SERPL-MCNC: 656.8 PG/ML (ref 0–1800)
PHOSPHATE SERPL-MCNC: 3.3 MG/DL (ref 2.5–4.5)
PLATELET # BLD AUTO: 396 10*3/MM3 (ref 140–450)
PMV BLD AUTO: 9.4 FL (ref 6–12)
POTASSIUM SERPL-SCNC: 4.4 MMOL/L (ref 3.5–5.2)
RBC # BLD AUTO: 3.26 10*6/MM3 (ref 4.14–5.8)
SODIUM SERPL-SCNC: 131 MMOL/L (ref 136–145)
WBC NRBC COR # BLD AUTO: 6.7 10*3/MM3 (ref 3.4–10.8)

## 2024-10-01 PROCEDURE — 97535 SELF CARE MNGMENT TRAINING: CPT

## 2024-10-01 PROCEDURE — 80069 RENAL FUNCTION PANEL: CPT | Performed by: PHYSICIAN ASSISTANT

## 2024-10-01 PROCEDURE — 97110 THERAPEUTIC EXERCISES: CPT

## 2024-10-01 PROCEDURE — 97116 GAIT TRAINING THERAPY: CPT

## 2024-10-01 PROCEDURE — 83880 ASSAY OF NATRIURETIC PEPTIDE: CPT | Performed by: PHYSICIAN ASSISTANT

## 2024-10-01 PROCEDURE — 25010000002 INFLUENZA VAC SPLIT HIGH-DOSE 0.5 ML SUSPENSION PREFILLED SYRINGE: Performed by: PHYSICIAN ASSISTANT

## 2024-10-01 PROCEDURE — 97530 THERAPEUTIC ACTIVITIES: CPT

## 2024-10-01 PROCEDURE — 99308 SBSQ NF CARE LOW MDM 20: CPT | Performed by: PHYSICIAN ASSISTANT

## 2024-10-01 PROCEDURE — 85027 COMPLETE CBC AUTOMATED: CPT | Performed by: PHYSICIAN ASSISTANT

## 2024-10-01 PROCEDURE — G0008 ADMIN INFLUENZA VIRUS VAC: HCPCS | Performed by: PHYSICIAN ASSISTANT

## 2024-10-01 PROCEDURE — 90662 IIV NO PRSV INCREASED AG IM: CPT | Performed by: PHYSICIAN ASSISTANT

## 2024-10-01 RX ORDER — METOPROLOL SUCCINATE 25 MG/1
25 TABLET, EXTENDED RELEASE ORAL DAILY
Status: DISCONTINUED | OUTPATIENT
Start: 2024-10-02 | End: 2024-10-03 | Stop reason: HOSPADM

## 2024-10-01 RX ADMIN — FUROSEMIDE 10 MG: 20 TABLET ORAL at 08:57

## 2024-10-01 RX ADMIN — Medication 5 MG: at 21:06

## 2024-10-01 RX ADMIN — TICAGRELOR 90 MG: 90 TABLET ORAL at 21:06

## 2024-10-01 RX ADMIN — METOPROLOL SUCCINATE 50 MG: 50 TABLET, EXTENDED RELEASE ORAL at 08:56

## 2024-10-01 RX ADMIN — FINASTERIDE 5 MG: 5 TABLET, FILM COATED ORAL at 08:56

## 2024-10-01 RX ADMIN — TICAGRELOR 90 MG: 90 TABLET ORAL at 08:56

## 2024-10-01 RX ADMIN — SENNOSIDES AND DOCUSATE SODIUM 2 TABLET: 50; 8.6 TABLET ORAL at 21:06

## 2024-10-01 RX ADMIN — INFLUENZA A VIRUS A/VICTORIA/4897/2022 IVR-238 (H1N1) ANTIGEN (FORMALDEHYDE INACTIVATED), INFLUENZA A VIRUS A/CALIFORNIA/122/2022 SAN-022 (H3N2) ANTIGEN (FORMALDEHYDE INACTIVATED), AND INFLUENZA B VIRUS B/MICHIGAN/01/2021 ANTIGEN (FORMALDEHYDE INACTIVATED) 0.5 ML: 60; 60; 60 INJECTION, SUSPENSION INTRAMUSCULAR at 13:40

## 2024-10-01 RX ADMIN — MEMANTINE 5 MG: 5 TABLET ORAL at 21:06

## 2024-10-01 RX ADMIN — TAMSULOSIN HYDROCHLORIDE 0.4 MG: 0.4 CAPSULE ORAL at 08:57

## 2024-10-01 RX ADMIN — AMLODIPINE BESYLATE 2.5 MG: 5 TABLET ORAL at 08:57

## 2024-10-01 RX ADMIN — LEVOTHYROXINE SODIUM 137 MCG: 137 TABLET ORAL at 08:57

## 2024-10-01 RX ADMIN — PANTOPRAZOLE SODIUM 40 MG: 40 TABLET, DELAYED RELEASE ORAL at 08:56

## 2024-10-01 RX ADMIN — MEMANTINE 5 MG: 5 TABLET ORAL at 08:56

## 2024-10-01 RX ADMIN — FERROUS SULFATE TAB 325 MG (65 MG ELEMENTAL FE) 325 MG: 325 (65 FE) TAB at 08:57
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
## 2024-10-01 NOTE — PLAN OF CARE
Goal Outcome Evaluation:   AAOx3, call light and personal items within reach. Able to make needs known to staff. No c/o pain during the shift. F/C remains patent. Positional changes done independently. Con't plan of care
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
## 2024-10-01 NOTE — PLAN OF CARE
Goal Outcome Evaluation:  Plan of Care Reviewed With: patient        Progress: no change  Outcome Evaluation: AOx3-4, call light and personal items within reach. Able to make needs known to staff. No c/o pain during the shift. F/C remains patent, yellow urine. Education given about stool softerns and laxatives due to last BM charted was 9/27. Pt refused medications to help with BMs. Education reinforced. Con't plan of care

## 2024-10-01 NOTE — PROGRESS NOTES
[facility]   Hospitalist Progress Note  Date: 10/1/2024  Patient Name: Dixon Amador  : 1945  MRN: [id number]  Date of admission: 2024      Subjective   Subjective     Chief Complaint: Weakness    Summary: Dixon Amador is a 78 y.o. male past medical history significant for coronary artery disease status post PCI 2024 on Brilinta, atrial fibrillation on Eliquis, carotid artery stenosis, hypothyroidism, hypertension, hyperlipidemia, that initially presents to the emergency department with hematuria admitted the hospitalist service urology consulted underwent cystoscopy with clot evacuation and fulguration of bleeding vessels on .  Almeida catheter with CBI started urine cleared Almeida remains in place.  Patient's Eliquis is on hold but his Brilinta has been continued given his recent PCI on 2024 patient to follow-up with Dr. Galvez outpatient regarding resuming Eliquis.  Patient seen by PT and OT deemed a good candidate for inpatient rehab is been transferred to skilled nursing facility.     Interval Followup: Patient seen and examined resting comfortably reports getting dizzy when standing orthostatics checked and positive holding many of the patient's antihypertensives hold diuretics.  Seen by speech therapy patient has remote history of head neck cancer status post treatment reports difficulty swallowing since that time patient reports that he was told he needed a PEG placement however he refuses to do so and chooses to eat and drink by mouth despite the aspiration risk given refusal of PEG tube will continue with diet with patient voicing his understanding of aspiration risk we will give strategies to help prevent aspiration.      Objective   Objective     Vitals:   Temp:  [97.7 °F (36.5 °C)-98.1 °F (36.7 °C)] 98.1 °F (36.7 °C)  Heart Rate:  [66-76] 66  Resp:  [16] 16  BP: ()/(48-82) 80/69  Flow (L/min):  [2] 2    Physical Exam   Constitutional: Frail phonically  ill-appearing male no acute distress      Result Review    Result Review:  I have personally reviewed the results from the time of this admission to 10/1/2024 15:46 EDT and agree with these findings:  []  Laboratory  []  Microbiology  []  Radiology  []  EKG/Telemetry   []  Cardiology/Vascular   []  Pathology  []  Old records  []  Other:    Assessment & Plan   Assessment / Plan     Assessment:    Hospital-acquired weakness  Gross hematuria status post cystoscopy with clot evacuation and fulguration of bleeding vessels 9/25/2024  Severe prostatomegaly on CT  Urinary retention  UTI from unspecified organism  Paroxysmal atrial fibrillation on Eliquis... Currently on hold given recurrent gross hematuria  CAD with previous PCI most recently February 2024 on Brilinta  Peripheral vascular disease  Severe right ICA stenosis   Hypertension  Hypothyroidism  Iron deficiency anemia  History of head neck cancer status posttreatment  Chronic dysphagia/aspiration refusing PEG placement    Plan:     Continue with Almeida catheter follow-up outpatient with urology  Eliquis on hold continue with Brilinta  Follow-up with outpatient cardiology Dr. Galvez  Monitor chemistries and CBC  On iron replacement  Continue speech therapy recommendations patient is at high risk for aspiration and is likely aspirating patient refuses PEG placement will continue diet with speech therapy provided tragedies to prevent aspiration.  Patient understands he is at high risk for aspiration but wishes to continue with diet.  Daily PT OT  Further treatment contingent upon his hospital course     Discussed plan with RN.    VTE Prophylaxis:  Mechanical VTE prophylaxis orders are present.        CODE STATUS:   Medical Intervention Limits: No intubation (DNI)  Level Of Support Discussed With: Patient  Code Status (Patient has no pulse and is not breathing): No CPR (Do Not Attempt to Resuscitate)  Medical Interventions (Patient has pulse or is breathing): Limited  Support        Electronically signed by ANTONIO Carranza, 10/01/24, 3:46 PM EDT.

## 2024-10-01 NOTE — CONSULTS
"Purpose of the visit was to evaluate for: goals of care/advanced care planning. Spoke with MD, RN, and patient and discussed palliative care, goals of care, care options, resuscitation status, and advanced care planning.      Assessment:Mr. Amador has a past medical history significant for coronary artery disease status post PCI February 2024 on Brilinta, atrial fibrillation on Eliquis, carotid artery stenosis, hypothyroidism, hypertension, hyperlipidemia, that initially presents to the emergency department with hematuria admitted the hospitalist service urology consulted underwent cystoscopy with clot evacuation and fulguration of bleeding vessels on 9/25. OT working with pt at time of consult. PT had showered and was tired after. However pt was able to do arm bike while consulting. Discussed goals of care. Pt reports that he just wants to get his strength up to go home and work on his cars. Reports he has a coupld Model A Ford's he likes to work on and take to car shows. Dicussed this in detail. After talking we discussed living will and [place] MOST form and completed. CODE STATUS DNR/DNI. Provided emotional support. Briefly dicussed health status at which point it became clear pt still has motivation to do what he can at this time however he does \"not want to be a vegetable\" clarified that he meant he wanted to be DNR/DNI verbalized YES. Spoke with MD and Primary RN new orders placed. [place] MOST and Living Will in chart. Palliative care will continue to follow for emotional support.       Recommendations/Plan:  pending clinical course .    Tasks Completed: Livingwill, Code Status clarification, Emotional Support, and [place] MOST  .        "

## 2024-10-02 PROBLEM — E43 SEVERE MALNUTRITION: Status: ACTIVE | Noted: 2024-10-02

## 2024-10-02 PROCEDURE — 97110 THERAPEUTIC EXERCISES: CPT

## 2024-10-02 PROCEDURE — 97112 NEUROMUSCULAR REEDUCATION: CPT

## 2024-10-02 PROCEDURE — 99309 SBSQ NF CARE MODERATE MDM 30: CPT | Performed by: PHYSICIAN ASSISTANT

## 2024-10-02 PROCEDURE — 97530 THERAPEUTIC ACTIVITIES: CPT

## 2024-10-02 PROCEDURE — 97116 GAIT TRAINING THERAPY: CPT

## 2024-10-02 PROCEDURE — 97535 SELF CARE MNGMENT TRAINING: CPT

## 2024-10-02 RX ORDER — HYDROCODONE BITARTRATE AND ACETAMINOPHEN 5; 325 MG/1; MG/1
1 TABLET ORAL EVERY 6 HOURS PRN
Status: DISCONTINUED | OUTPATIENT
Start: 2024-10-02 | End: 2024-10-03 | Stop reason: HOSPADM

## 2024-10-02 RX ORDER — HYDROCODONE BITARTRATE AND ACETAMINOPHEN 7.5; 325 MG/1; MG/1
1 TABLET ORAL EVERY 6 HOURS PRN
Status: DISCONTINUED | OUTPATIENT
Start: 2024-10-02 | End: 2024-10-03 | Stop reason: HOSPADM

## 2024-10-02 RX ADMIN — PANTOPRAZOLE SODIUM 40 MG: 40 TABLET, DELAYED RELEASE ORAL at 09:53

## 2024-10-02 RX ADMIN — Medication 5 MG: at 20:56

## 2024-10-02 RX ADMIN — MEMANTINE 5 MG: 5 TABLET ORAL at 09:53

## 2024-10-02 RX ADMIN — SENNOSIDES AND DOCUSATE SODIUM 2 TABLET: 50; 8.6 TABLET ORAL at 09:53

## 2024-10-02 RX ADMIN — LEVOTHYROXINE SODIUM 137 MCG: 137 TABLET ORAL at 07:27

## 2024-10-02 RX ADMIN — TICAGRELOR 90 MG: 90 TABLET ORAL at 20:56

## 2024-10-02 RX ADMIN — TICAGRELOR 90 MG: 90 TABLET ORAL at 09:53

## 2024-10-02 RX ADMIN — BISACODYL 5 MG: 5 TABLET, COATED ORAL at 09:53

## 2024-10-02 RX ADMIN — FINASTERIDE 5 MG: 5 TABLET, FILM COATED ORAL at 09:54

## 2024-10-02 RX ADMIN — MEMANTINE 5 MG: 5 TABLET ORAL at 20:56

## 2024-10-02 RX ADMIN — TAMSULOSIN HYDROCHLORIDE 0.4 MG: 0.4 CAPSULE ORAL at 09:53

## 2024-10-02 RX ADMIN — FERROUS SULFATE TAB 325 MG (65 MG ELEMENTAL FE) 325 MG: 325 (65 FE) TAB at 09:54

## 2024-10-02 NOTE — PLAN OF CARE
Goal Outcome Evaluation:           Progress: improving  Outcome Evaluation: Patient has been alert and oriented x4 this shift. Pleasant with staff. C/O neck pain just after therapy but had not pain meds ordered. Provider notified and new order received for pain meds. Patient updated of situation but stated pain had calmed down at that point and refused pain meds offered. Urologist placed galvan catheter remains in place. 1000ml yellow urine output this shift from galvan. Voices needs. Con't current POC.

## 2024-10-02 NOTE — PROGRESS NOTES
[facility]   Hospitalist Progress Note  Date: 10/2/2024  Patient Name: Dixon Amador  : 1945  MRN: [id number]  Date of admission: 2024      Subjective   Subjective     Chief Complaint: Weakness    Summary: Dixon Amador is a 78 y.o. male past medical history significant for coronary artery disease status post PCI 2024 on Brilinta, atrial fibrillation on Eliquis, carotid artery stenosis, hypothyroidism, hypertension, hyperlipidemia, that initially presents to the emergency department with hematuria admitted the hospitalist service urology consulted underwent cystoscopy with clot evacuation and fulguration of bleeding vessels on .  Almeida catheter with CBI started urine cleared Almieda remains in place.  Patient's Eliquis is on hold but his Brilinta has been continued given his recent PCI on 2024 patient to follow-up with Dr. Galvez outpatient regarding resuming Eliquis.  Patient seen by PT and OT deemed a good candidate for inpatient rehab is been transferred to skilled nursing facility.     Interval Followup: 10/2/2024    Losartan and Lasix are on hold.    His blood pressure has improved.  His lightheadedness has resolved.    Tolerating physical therapy   Patient has voiced understanding of aspiration risk.  He chooses to eat and drink by mouth.  Apparently has refused PEG placement in past.    BP better: 120s/50s  Room air  Afebrile    Objective   Objective     Vitals:   Temp:  [97.5 °F (36.4 °C)-98.1 °F (36.7 °C)] 97.5 °F (36.4 °C)  Heart Rate:  [63-77] 63  Resp:  [16] 16  BP: (124-131)/(53-59) 124/53    Physical Exam   Constitutional: Frail chronically ill-appearing male no acute distress  Lungs clear without wheeze  Heart tones regular without murmur  No lower extremity edema  Abdomen soft nontender  Almeida catheter noted.  Pale yellow urine noted    Result Review    Result Review:  I have personally reviewed the results from the time of this admission to 10/2/2024 16:04 EDT  and agree with these findings:  []  Laboratory  []  Microbiology  []  Radiology  []  EKG/Telemetry   []  Cardiology/Vascular   []  Pathology  []  Old records  []  Other:    Assessment & Plan   Assessment / Plan     Assessment:    Hospital-acquired weakness  Gross hematuria   Status post cystoscopy with clot evacuation and fulguration of bleeding vessels 9/25/2024  Severe prostatomegaly on CT  Urinary retention  UTI from unspecified organism  Paroxysmal atrial fibrillation on Eliquis... Currently on hold given recurrent gross hematuria  CAD with previous PCI most recently February 2024, remains on Brilinta  Peripheral vascular disease  Severe right ICA stenosis   Hypertension  Hypothyroidism  Iron deficiency anemia  History of head neck cancer status posttreatment  Chronic dysphagia/aspiration refusing PEG placement    Plan:     Daily PT and SNF   Continue with Almeida catheter; F/U outpatient with urology (Dr. Oviedo)  Eliquis on hold   Continue with Brilinta in light of recent stents  Follow-up with outpatient cardiology (Dr. Galvez)  Monitor chemistries and CBC  On iron replacement  Continue speech therapy recommendations patient is at high risk for aspiration and is likely aspirating patient refuses PEG placement will continue diet with speech therapy provided tragedies to prevent aspiration.  Patient understands he is at high risk for aspiration but wishes to continue with diet.  Daily PT OT  Further treatment contingent upon his hospital course     Discussed plan with RN.    VTE Prophylaxis:  Mechanical VTE prophylaxis orders are present.        CODE STATUS:   Medical Intervention Limits: No intubation (DNI)  Level Of Support Discussed With: Patient  Code Status (Patient has no pulse and is not breathing): No CPR (Do Not Attempt to Resuscitate)  Medical Interventions (Patient has pulse or is breathing): Limited Support

## 2024-10-02 NOTE — PLAN OF CARE
Goal Outcome Evaluation:           Progress: no change  Outcome Evaluation: Rsd. is alert and oriented x3, able to make needs known to staff.  Rsd. denies pain this shift.  Medicated x1 with prn melatonin, states medication partially effective.  Rsd. medicated for bowels, bowel sounds active x4 quads.  F/C remains patent, secured, and below level of bladder.  Urine yellow.  Call light and personal items within reach.  Rsd. reminded to use call light for assistance, verbalizes understanding.  Rsd. denies pain.  Will continue to monitor and notify on-coming staff.  Current plan of care remains in place at this time.  Bed/chair alert remain in place at this time.
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
## 2024-10-03 ENCOUNTER — HOSPITAL ENCOUNTER (INPATIENT)
Facility: HOSPITAL | Age: 79
LOS: 2 days | Discharge: NURSING, REHAB OR LONG-TERM CARE | End: 2024-10-05
Attending: FAMILY MEDICINE | Admitting: FAMILY MEDICINE
Payer: MEDICARE

## 2024-10-03 ENCOUNTER — TELEPHONE (OUTPATIENT)
Dept: FAMILY MEDICINE CLINIC | Age: 79
End: 2024-10-03
Payer: MEDICARE

## 2024-10-03 ENCOUNTER — TELEPHONE (OUTPATIENT)
Dept: UROLOGY | Facility: CLINIC | Age: 79
End: 2024-10-03
Payer: MEDICARE

## 2024-10-03 VITALS
BODY MASS INDEX: 19.91 KG/M2 | OXYGEN SATURATION: 99 % | HEART RATE: 83 BPM | RESPIRATION RATE: 16 BRPM | WEIGHT: 131.39 LBS | SYSTOLIC BLOOD PRESSURE: 140 MMHG | TEMPERATURE: 97.7 F | DIASTOLIC BLOOD PRESSURE: 62 MMHG | HEIGHT: 68 IN

## 2024-10-03 DIAGNOSIS — R33.8 ACUTE URINARY RETENTION: Primary | ICD-10-CM

## 2024-10-03 DIAGNOSIS — R31.0 GROSS HEMATURIA: ICD-10-CM

## 2024-10-03 PROBLEM — R53.1 GENERALIZED WEAKNESS: Status: ACTIVE | Noted: 2024-10-03

## 2024-10-03 LAB — SARS-COV-2 RNA RESP QL NAA+PROBE: DETECTED

## 2024-10-03 PROCEDURE — 97530 THERAPEUTIC ACTIVITIES: CPT

## 2024-10-03 PROCEDURE — 97116 GAIT TRAINING THERAPY: CPT

## 2024-10-03 PROCEDURE — 87635 SARS-COV-2 COVID-19 AMP PRB: CPT | Performed by: PHYSICIAN ASSISTANT

## 2024-10-03 PROCEDURE — 97535 SELF CARE MNGMENT TRAINING: CPT

## 2024-10-03 PROCEDURE — 97110 THERAPEUTIC EXERCISES: CPT

## 2024-10-03 PROCEDURE — 99239 HOSP IP/OBS DSCHRG MGMT >30: CPT | Performed by: PHYSICIAN ASSISTANT

## 2024-10-03 RX ORDER — POLYETHYLENE GLYCOL 3350 17 G/17G
17 POWDER, FOR SOLUTION ORAL DAILY PRN
Status: CANCELLED | OUTPATIENT
Start: 2024-10-03

## 2024-10-03 RX ORDER — SODIUM CHLORIDE 0.9 % (FLUSH) 0.9 %
10 SYRINGE (ML) INJECTION EVERY 12 HOURS SCHEDULED
Status: DISCONTINUED | OUTPATIENT
Start: 2024-10-04 | End: 2024-10-05 | Stop reason: HOSPADM

## 2024-10-03 RX ORDER — TAMSULOSIN HYDROCHLORIDE 0.4 MG/1
0.4 CAPSULE ORAL DAILY
Status: CANCELLED | OUTPATIENT
Start: 2024-10-04 | End: 2024-10-24

## 2024-10-03 RX ORDER — AMLODIPINE BESYLATE 5 MG/1
2.5 TABLET ORAL
Status: DISCONTINUED | OUTPATIENT
Start: 2024-10-04 | End: 2024-10-05 | Stop reason: HOSPADM

## 2024-10-03 RX ORDER — FERROUS SULFATE 325(65) MG
325 TABLET ORAL
Status: CANCELLED | OUTPATIENT
Start: 2024-10-04

## 2024-10-03 RX ORDER — FINASTERIDE 5 MG/1
5 TABLET, FILM COATED ORAL DAILY
Status: CANCELLED | OUTPATIENT
Start: 2024-10-04 | End: 2024-10-24

## 2024-10-03 RX ORDER — MEMANTINE HYDROCHLORIDE 5 MG/1
5 TABLET ORAL 2 TIMES DAILY
Status: CANCELLED | OUTPATIENT
Start: 2024-10-03

## 2024-10-03 RX ORDER — METOPROLOL SUCCINATE 25 MG/1
25 TABLET, EXTENDED RELEASE ORAL DAILY
Status: CANCELLED | OUTPATIENT
Start: 2024-10-04

## 2024-10-03 RX ORDER — BISACODYL 5 MG/1
5 TABLET, DELAYED RELEASE ORAL DAILY PRN
Status: CANCELLED | OUTPATIENT
Start: 2024-10-03

## 2024-10-03 RX ORDER — BISACODYL 10 MG
10 SUPPOSITORY, RECTAL RECTAL DAILY PRN
Status: CANCELLED | OUTPATIENT
Start: 2024-10-03

## 2024-10-03 RX ORDER — MEMANTINE HYDROCHLORIDE 10 MG/1
5 TABLET ORAL 2 TIMES DAILY
Status: DISCONTINUED | OUTPATIENT
Start: 2024-10-04 | End: 2024-10-05 | Stop reason: HOSPADM

## 2024-10-03 RX ORDER — BISACODYL 5 MG/1
5 TABLET, DELAYED RELEASE ORAL DAILY PRN
Status: DISCONTINUED | OUTPATIENT
Start: 2024-10-03 | End: 2024-10-05 | Stop reason: HOSPADM

## 2024-10-03 RX ORDER — POLYETHYLENE GLYCOL 3350 17 G/17G
17 POWDER, FOR SOLUTION ORAL DAILY PRN
Status: DISCONTINUED | OUTPATIENT
Start: 2024-10-03 | End: 2024-10-05 | Stop reason: HOSPADM

## 2024-10-03 RX ORDER — LEVOTHYROXINE SODIUM 137 UG/1
137 TABLET ORAL
Status: CANCELLED | OUTPATIENT
Start: 2024-10-04

## 2024-10-03 RX ORDER — FUROSEMIDE 20 MG
10 TABLET ORAL DAILY
Status: DISCONTINUED | OUTPATIENT
Start: 2024-10-04 | End: 2024-10-05 | Stop reason: HOSPADM

## 2024-10-03 RX ORDER — FERROUS SULFATE 325(65) MG
325 TABLET ORAL
Status: DISCONTINUED | OUTPATIENT
Start: 2024-10-04 | End: 2024-10-05 | Stop reason: HOSPADM

## 2024-10-03 RX ORDER — PANTOPRAZOLE SODIUM 40 MG/1
40 TABLET, DELAYED RELEASE ORAL DAILY
Status: CANCELLED | OUTPATIENT
Start: 2024-10-04

## 2024-10-03 RX ORDER — HYDROCODONE BITARTRATE AND ACETAMINOPHEN 7.5; 325 MG/1; MG/1
1 TABLET ORAL EVERY 8 HOURS PRN
Status: DISCONTINUED | OUTPATIENT
Start: 2024-10-03 | End: 2024-10-05 | Stop reason: HOSPADM

## 2024-10-03 RX ORDER — METOPROLOL SUCCINATE 25 MG/1
25 TABLET, EXTENDED RELEASE ORAL DAILY
Status: DISCONTINUED | OUTPATIENT
Start: 2024-10-04 | End: 2024-10-05 | Stop reason: HOSPADM

## 2024-10-03 RX ORDER — LOSARTAN POTASSIUM 50 MG/1
50 TABLET ORAL 2 TIMES DAILY
Status: CANCELLED | OUTPATIENT
Start: 2024-10-03

## 2024-10-03 RX ORDER — SODIUM CHLORIDE 0.9 % (FLUSH) 0.9 %
10 SYRINGE (ML) INJECTION EVERY 12 HOURS SCHEDULED
Status: CANCELLED | OUTPATIENT
Start: 2024-10-03

## 2024-10-03 RX ORDER — FINASTERIDE 5 MG/1
5 TABLET, FILM COATED ORAL DAILY
Status: DISCONTINUED | OUTPATIENT
Start: 2024-10-04 | End: 2024-10-05 | Stop reason: HOSPADM

## 2024-10-03 RX ORDER — FUROSEMIDE 20 MG/1
10 TABLET ORAL DAILY
Status: CANCELLED | OUTPATIENT
Start: 2024-10-04

## 2024-10-03 RX ORDER — TAMSULOSIN HYDROCHLORIDE 0.4 MG/1
0.4 CAPSULE ORAL DAILY
Status: DISCONTINUED | OUTPATIENT
Start: 2024-10-04 | End: 2024-10-05 | Stop reason: HOSPADM

## 2024-10-03 RX ORDER — AMOXICILLIN 250 MG
2 CAPSULE ORAL 2 TIMES DAILY
Status: DISCONTINUED | OUTPATIENT
Start: 2024-10-04 | End: 2024-10-05 | Stop reason: HOSPADM

## 2024-10-03 RX ORDER — BISACODYL 10 MG
10 SUPPOSITORY, RECTAL RECTAL DAILY PRN
Status: DISCONTINUED | OUTPATIENT
Start: 2024-10-03 | End: 2024-10-05 | Stop reason: HOSPADM

## 2024-10-03 RX ORDER — HYDROCODONE BITARTRATE AND ACETAMINOPHEN 5; 325 MG/1; MG/1
1 TABLET ORAL EVERY 8 HOURS PRN
Status: DISCONTINUED | OUTPATIENT
Start: 2024-10-03 | End: 2024-10-05 | Stop reason: HOSPADM

## 2024-10-03 RX ORDER — AMOXICILLIN 250 MG
2 CAPSULE ORAL 2 TIMES DAILY
Status: CANCELLED | OUTPATIENT
Start: 2024-10-03

## 2024-10-03 RX ORDER — LOSARTAN POTASSIUM 50 MG/1
50 TABLET ORAL 2 TIMES DAILY
Status: DISCONTINUED | OUTPATIENT
Start: 2024-10-04 | End: 2024-10-05 | Stop reason: HOSPADM

## 2024-10-03 RX ORDER — AMLODIPINE BESYLATE 5 MG/1
2.5 TABLET ORAL
Status: CANCELLED | OUTPATIENT
Start: 2024-10-04 | End: 2024-10-24

## 2024-10-03 RX ORDER — PANTOPRAZOLE SODIUM 40 MG/1
40 TABLET, DELAYED RELEASE ORAL DAILY
Status: DISCONTINUED | OUTPATIENT
Start: 2024-10-04 | End: 2024-10-05 | Stop reason: HOSPADM

## 2024-10-03 RX ADMIN — FERROUS SULFATE TAB 325 MG (65 MG ELEMENTAL FE) 325 MG: 325 (65 FE) TAB at 07:51

## 2024-10-03 RX ADMIN — TICAGRELOR 90 MG: 90 TABLET ORAL at 20:52

## 2024-10-03 RX ADMIN — HYDROCODONE BITARTRATE AND ACETAMINOPHEN 1 TABLET: 7.5; 325 TABLET ORAL at 11:24

## 2024-10-03 RX ADMIN — MEMANTINE 5 MG: 5 TABLET ORAL at 20:53

## 2024-10-03 RX ADMIN — SENNOSIDES AND DOCUSATE SODIUM 2 TABLET: 50; 8.6 TABLET ORAL at 20:53

## 2024-10-03 RX ADMIN — TICAGRELOR 90 MG: 90 TABLET ORAL at 08:30

## 2024-10-03 RX ADMIN — Medication 5 MG: at 23:22

## 2024-10-03 RX ADMIN — METOPROLOL SUCCINATE 25 MG: 25 TABLET, FILM COATED, EXTENDED RELEASE ORAL at 08:30

## 2024-10-03 RX ADMIN — FINASTERIDE 5 MG: 5 TABLET, FILM COATED ORAL at 08:30

## 2024-10-03 RX ADMIN — LEVOTHYROXINE SODIUM 137 MCG: 137 TABLET ORAL at 07:17

## 2024-10-03 RX ADMIN — Medication 10 ML: at 08:31

## 2024-10-03 RX ADMIN — MEMANTINE 5 MG: 5 TABLET ORAL at 08:30

## 2024-10-03 RX ADMIN — PANTOPRAZOLE SODIUM 40 MG: 40 TABLET, DELAYED RELEASE ORAL at 08:30

## 2024-10-03 RX ADMIN — TAMSULOSIN HYDROCHLORIDE 0.4 MG: 0.4 CAPSULE ORAL at 08:30

## 2024-10-03 NOTE — TELEPHONE ENCOUNTER
PATIENT WIFE CALLED IN REQUESTING SOONER APPT, SHE WOULD NOT GO INTO DETAILS ABOUT WHY PATIENT NEEDS SOONER APPT, SHE ASKED THAT DR. MARIE NURSE CALL HER BACK TO DISCUSS.    PLEASE CALL PT'S WIFE BACK TO DISCUSS.  [phone] Andrew SANTIAGO
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
## 2024-10-03 NOTE — PLAN OF CARE
Goal Outcome Evaluation:  Plan of Care Reviewed With: patient        Progress: improving  Outcome Evaluation: Alert and oriented and pleasant with staff. x1 assist for transfers and ambulation. x1 admin PRN pain medication with relief of symptoms noted. Almeida catheter remains in place, patent and draining. Sitting up in bed, call light in reach.

## 2024-10-03 NOTE — TELEPHONE ENCOUNTER
Pt has no one listed on verbal consent but looks like he is currently admitted then : Patient seen by PT and OT deemed a good candidate for inpatient rehab is been transferred to skilled nursing facility.     I called freddie and let her know there is no one his verbal other then hisself . She said he is still in the hospital I informed her to express any and all concerns to the hospital so they can place the right referrals

## 2024-10-03 NOTE — TELEPHONE ENCOUNTER
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 PAMELA STATES THEY WOULD RATHER NOT SEE RIZWAN HOLLOWAY BECAUSE SHE IS AN APRN.
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
## 2024-10-04 LAB
ALBUMIN SERPL-MCNC: 3 G/DL (ref 3.5–5.2)
ALP SERPL-CCNC: 157 U/L (ref 39–117)
ALT SERPL W P-5'-P-CCNC: 57 U/L (ref 1–41)
ANION GAP SERPL CALCULATED.3IONS-SCNC: 7.6 MMOL/L (ref 5–15)
AST SERPL-CCNC: 48 U/L (ref 1–40)
BASOPHILS # BLD AUTO: 0.03 10*3/MM3 (ref 0–0.2)
BASOPHILS NFR BLD AUTO: 0.5 % (ref 0–1.5)
BILIRUB CONJ SERPL-MCNC: 0.1 MG/DL (ref 0–0.3)
BILIRUB INDIRECT SERPL-MCNC: 0.2 MG/DL
BILIRUB SERPL-MCNC: 0.3 MG/DL (ref 0–1.2)
BUN SERPL-MCNC: 24 MG/DL (ref 8–23)
BUN/CREAT SERPL: 19.4 (ref 7–25)
CALCIUM SPEC-SCNC: 8.7 MG/DL (ref 8.6–10.5)
CHLORIDE SERPL-SCNC: 103 MMOL/L (ref 98–107)
CO2 SERPL-SCNC: 25.4 MMOL/L (ref 22–29)
CREAT SERPL-MCNC: 1.24 MG/DL (ref 0.76–1.27)
DEPRECATED RDW RBC AUTO: 49.9 FL (ref 37–54)
EGFRCR SERPLBLD CKD-EPI 2021: 59.5 ML/MIN/1.73
EOSINOPHIL # BLD AUTO: 0.22 10*3/MM3 (ref 0–0.4)
EOSINOPHIL NFR BLD AUTO: 3.3 % (ref 0.3–6.2)
ERYTHROCYTE [DISTWIDTH] IN BLOOD BY AUTOMATED COUNT: 15.4 % (ref 12.3–15.4)
GLUCOSE SERPL-MCNC: 114 MG/DL (ref 65–99)
HCT VFR BLD AUTO: 25.5 % (ref 37.5–51)
HGB BLD-MCNC: 8.2 G/DL (ref 13–17.7)
IMM GRANULOCYTES # BLD AUTO: 0.02 10*3/MM3 (ref 0–0.05)
IMM GRANULOCYTES NFR BLD AUTO: 0.3 % (ref 0–0.5)
LYMPHOCYTES # BLD AUTO: 0.84 10*3/MM3 (ref 0.7–3.1)
LYMPHOCYTES NFR BLD AUTO: 12.7 % (ref 19.6–45.3)
MAGNESIUM SERPL-MCNC: 1.8 MG/DL (ref 1.6–2.4)
MCH RBC QN AUTO: 28.5 PG (ref 26.6–33)
MCHC RBC AUTO-ENTMCNC: 32.2 G/DL (ref 31.5–35.7)
MCV RBC AUTO: 88.5 FL (ref 79–97)
MONOCYTES # BLD AUTO: 0.43 10*3/MM3 (ref 0.1–0.9)
MONOCYTES NFR BLD AUTO: 6.5 % (ref 5–12)
NEUTROPHILS NFR BLD AUTO: 5.08 10*3/MM3 (ref 1.7–7)
NEUTROPHILS NFR BLD AUTO: 76.7 % (ref 42.7–76)
NRBC BLD AUTO-RTO: 0 /100 WBC (ref 0–0.2)
PHOSPHATE SERPL-MCNC: 3 MG/DL (ref 2.5–4.5)
PLATELET # BLD AUTO: 373 10*3/MM3 (ref 140–450)
PMV BLD AUTO: 9.4 FL (ref 6–12)
POTASSIUM SERPL-SCNC: 4.3 MMOL/L (ref 3.5–5.2)
PROT SERPL-MCNC: 5.9 G/DL (ref 6–8.5)
RBC # BLD AUTO: 2.88 10*6/MM3 (ref 4.14–5.8)
SODIUM SERPL-SCNC: 136 MMOL/L (ref 136–145)
WBC NRBC COR # BLD AUTO: 6.62 10*3/MM3 (ref 3.4–10.8)

## 2024-10-04 PROCEDURE — 97165 OT EVAL LOW COMPLEX 30 MIN: CPT

## 2024-10-04 PROCEDURE — 85025 COMPLETE CBC W/AUTO DIFF WBC: CPT | Performed by: FAMILY MEDICINE

## 2024-10-04 PROCEDURE — 84100 ASSAY OF PHOSPHORUS: CPT | Performed by: FAMILY MEDICINE

## 2024-10-04 PROCEDURE — 80076 HEPATIC FUNCTION PANEL: CPT | Performed by: FAMILY MEDICINE

## 2024-10-04 PROCEDURE — 80048 BASIC METABOLIC PNL TOTAL CA: CPT | Performed by: FAMILY MEDICINE

## 2024-10-04 PROCEDURE — 99223 1ST HOSP IP/OBS HIGH 75: CPT | Performed by: FAMILY MEDICINE

## 2024-10-04 PROCEDURE — 83735 ASSAY OF MAGNESIUM: CPT | Performed by: FAMILY MEDICINE

## 2024-10-04 RX ADMIN — TICAGRELOR 90 MG: 90 TABLET ORAL at 20:59

## 2024-10-04 RX ADMIN — LEVOTHYROXINE SODIUM 137 MCG: 0.11 TABLET ORAL at 09:23

## 2024-10-04 RX ADMIN — MEMANTINE 5 MG: 10 TABLET ORAL at 20:59

## 2024-10-04 RX ADMIN — TAMSULOSIN HYDROCHLORIDE 0.4 MG: 0.4 CAPSULE ORAL at 09:24

## 2024-10-04 RX ADMIN — SENNOSIDES AND DOCUSATE SODIUM 2 TABLET: 50; 8.6 TABLET ORAL at 09:24

## 2024-10-04 RX ADMIN — TICAGRELOR 90 MG: 90 TABLET ORAL at 09:24

## 2024-10-04 RX ADMIN — PANTOPRAZOLE SODIUM 40 MG: 40 TABLET, DELAYED RELEASE ORAL at 09:24

## 2024-10-04 RX ADMIN — FINASTERIDE 5 MG: 5 TABLET, FILM COATED ORAL at 09:24

## 2024-10-04 RX ADMIN — SENNOSIDES AND DOCUSATE SODIUM 2 TABLET: 50; 8.6 TABLET ORAL at 20:58

## 2024-10-04 RX ADMIN — MEMANTINE 5 MG: 10 TABLET ORAL at 09:24

## 2024-10-04 RX ADMIN — FERROUS SULFATE TAB 325 MG (65 MG ELEMENTAL FE) 325 MG: 325 (65 FE) TAB at 09:24

## 2024-10-04 RX ADMIN — Medication 5 MG: at 20:59

## 2024-10-04 RX ADMIN — METOPROLOL SUCCINATE 25 MG: 25 TABLET, FILM COATED, EXTENDED RELEASE ORAL at 09:24
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
## 2024-10-04 NOTE — TELEPHONE ENCOUNTER
Spoke to patients wife. Pt is still admitted, unsure when he will get discharged as patient has covid now. we will keep appointment as scheduled. Wife verbalized understanding of information.

## 2024-10-04 NOTE — NURSING NOTE
Bed placement called notified this RN patient had ready bed in 4014.  This nurse asked Rsd. If she could call any family and update them and notify of room change.  Patient declined.  Stated he would let them know.  Report called and given to Talia SEQUEIRA.  Will give HS meds prior to transfer.

## 2024-10-04 NOTE — PLAN OF CARE
Goal Outcome Evaluation:  Plan of Care Reviewed With: patient        Progress: no change (Evaluation)  Outcome Evaluation: Patient presents with limitations of strength balance and activity  which impede his ability to perform ADLs/transfers as prior.  The skills of a therapist will be required to safely and effectively implement treatment plan to restore maximum level function.      Anticipated Discharge Disposition (OT): inpatient rehabilitation facility
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
## 2024-10-04 NOTE — PLAN OF CARE
"Goal Outcome Evaluation:  Plan of Care Reviewed With: patient        Progress: no change     Pt transferred from skilled nursing unit. Alert and oriented x4.Admission process completed. No complaints of pain or discomfort. Orthostatic blood pressures completed at 0334, see flowsheet. Pt able to make major body changes in the bed but cannot stand for long, too weak. Almeida cath in place with good output and no hematuria. Slept for a few hours but up with coffee now requesting donuts in his belongings. Reports swallowing difficulties at times but adamant about keeping regular diet. States \"all my doctors know\". On room air, sats good at 97%.                             "
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
## 2024-10-04 NOTE — NURSING NOTE
PT on precautions for COVID. Spoke with primary Rn who reports pt doing well no need from palliative care at this time. Palliative care will sign off. Any future needs from palliative care will need a new consult.         Becka PIERSON RN  Palliative Care

## 2024-10-04 NOTE — CASE MANAGEMENT/SOCIAL WORK
Discharge Planning Assessment  [facility]     Patient Name: Dixon Amador  MRN: [id number]  Today's Date: 10/4/2024    Admit Date: 10/3/2024     Discharge Plan       Row Name 10/04/24 1415       Plan    Patient/Family in Agreement with Plan yes    Final Discharge Disposition Code 62 - inpatient rehab facility    Final Note [facility] can offer pt a bed tomorrow, 10/05. SW spoke with pt who accepts bed offer. MD notified.               Continued Care and Services - Admitted Since 10/3/2024       Destination       Service Provider Request Status Selected Services Address Phone Fax Patient Preferred    [facility] ACCEPTED N/A [address] [facility] [id number] [phone] --             Expected Discharge Date and Time       Expected Discharge Date Expected Discharge Time    Oct 5, 2024         BHAVANI Hopper

## 2024-10-04 NOTE — NURSING NOTE
Rsd. Transferred to higher level of care with this RN and Transport.  Belongings packed and sent with Jacqueline.

## 2024-10-04 NOTE — DISCHARGE SUMMARY
[facility]         HOSPITALIST  DISCHARGE SUMMARY    Patient Name: Dixon Amador  : 1945  MRN: [id number]    Date of Admission: 2024  Date of Discharge:  Electronically signed by ANTONIO Mcgrath, 10/03/24, 9:34 PM EDT.    Primary Care Physician: Shayy Galaviz APRN    Consults       No orders found from 2024 to 2024.            Active and Resolved Hospital Problems:  Active Hospital Problems    Diagnosis POA    **Hematuria [R31.9] Yes    Severe malnutrition [E43] Unknown      Resolved Hospital Problems   No resolved problems to display.       Hospital Course     Hospital Course:  Dixon Amador is a 78 y.o. male with a past medical history significant for coronary artery disease status post PCI 2024 on Brilinta, atrial fibrillation on Eliquis, carotid artery stenosis, hypothyroidism, hypertension, hyperlipidemia, that initially presents to the emergency department with hematuria admitted the hospitalist service urology consulted underwent cystoscopy with clot evacuation and fulguration of bleeding vessels on . Almeida catheter with CBI started urine cleared Almeida remains in place. Patient's Eliquis is on hold but his Brilinta has been continued given his recent PCI on 2024 patient to follow-up with Dr. Galvez outpatient regarding resuming Eliquis. Patient seen by PT and OT deemed a good candidate for inpatient rehab is been transferred to skilled nursing facility.  Patient had been doing well working with PT and OT.  He did suffer from some lightheadedness, his blood pressure medications were held and this resolved.  There was some concern that the patient was aspirating, he understood aspiration risk and wished to continue eating by mouth.    On 10/23/2024 patient underwent routine COVID screening as required by skilled nursing protocols.  Unfortunately this came back positive so patient had to be moved to acute care side [facility].  Patient is  currently asymptomatic from COVID.        DISCHARGE Follow Up Recommendations for labs and diagnostics: Follow-up with primary care provider upon discharge.  Likely will need to be moved back to SNF once COVID isolation protocols are complete.  Currently asymptomatic from COVID, will monitor for oxygen requirement.      Day of Discharge     Vital Signs:  Temp:  [97.7 °F (36.5 °C)] 97.7 °F (36.5 °C)  Heart Rate:  [78-83] 83  Resp:  [16-18] 16  BP: (140-141)/(58-62) 140/62  Physical Exam:   Constitutional: Frail chronically ill-appearing male no acute distress  Lungs clear without wheeze  Heart tones regular without murmur  No lower extremity edema  Abdomen soft nontender  Almeida catheter noted.  Pale yellow urine noted      Discharge Details        Discharge Medications        ASK your doctor about these medications        Instructions Start Date   amLODIPine 5 MG tablet  Commonly known as: NORVASC   5 mg, Oral, Every 24 Hours Scheduled      ezetimibe 10 MG tablet  Commonly known as: ZETIA   10 mg, Oral, Daily      ferrous sulfate 325 (65 FE) MG tablet  Commonly known as: FerrouSul   325 mg, Oral, Daily With Breakfast      finasteride 5 MG tablet  Commonly known as: PROSCAR   5 mg, Oral, Daily      furosemide 20 MG tablet  Commonly known as: LASIX   10 mg, Oral, Daily      HYDROcodone-acetaminophen 7.5-325 MG per tablet  Commonly known as: NORCO   1 tablet, Oral, Every 6 Hours PRN      hydrOXYzine 50 MG tablet  Commonly known as: ATARAX   50 mg, Oral, Nightly PRN      levothyroxine 137 MCG tablet  Commonly known as: SYNTHROID, LEVOTHROID   137 mcg, Oral, Daily      losartan 100 MG tablet  Commonly known as: COZAAR   50 mg, Oral, 2 Times Daily      memantine 5 MG tablet  Commonly known as: NAMENDA   5 mg, Oral, 2 Times Daily      metoprolol succinate XL 50 MG 24 hr tablet  Commonly known as: TOPROL-XL   50 mg, Oral, Daily      pantoprazole 40 MG EC tablet  Commonly known as: PROTONIX   40 mg, Oral, Daily       tamsulosin 0.4 MG capsule 24 hr capsule  Commonly known as: FLOMAX   0.4 mg, Oral, Daily      ticagrelor 90 MG tablet tablet  Commonly known as: Brilinta   90 mg, Oral, 2 Times Daily               Allergies   Allergen Reactions    Atorvastatin Unknown - Low Severity    Codeine Unknown - Low Severity     causes delium  causes delium         Discharge Disposition:  Short Term Hospital ([facility])    Diet:  Hospital:  Diet Order   Procedures    Diet: Regular/House; Fluid Consistency: Thin (IDDSI 0)       Discharge Activity: Up with assistance      CODE STATUS:  Code Status and Medical Interventions: No CPR (Do Not Attempt to Resuscitate); Limited Support; No intubation (DNI)   Ordered at: 10/01/24 1003     Medical Intervention Limits:    No intubation (DNI)     Level Of Support Discussed With:    Patient     Code Status (Patient has no pulse and is not breathing):    No CPR (Do Not Attempt to Resuscitate)     Medical Interventions (Patient has pulse or is breathing):    Limited Support         Future Appointments   Date Time Provider Department Center   10/28/2024  9:00 AM Miguel Angel Oviedo MD [facility]           Pertinent  and/or Most Recent Results     PROCEDURES:   No procedures during stay in skilled nursing facility    LAB RESULTS:      Lab 10/01/24  0453 09/28/24  0444 09/27/24  1537 09/27/24 0429   WBC 6.70 7.63 7.87 6.89   HEMOGLOBIN 9.2* 8.7* 9.2* 8.7*   HEMATOCRIT 28.7* 26.4* 27.8* 26.6*   PLATELETS 396 315 311 300   NEUTROS ABS  --  5.44  --  4.92   IMMATURE GRANS (ABS)  --  0.08*  --  0.03   LYMPHS ABS  --  1.32  --  1.16   MONOS ABS  --  0.51  --  0.48   EOS ABS  --  0.26  --  0.28   MCV 88.0 88.6 88.8 89.0         Lab 10/01/24  0453 09/28/24  0444 09/27/24  0429   SODIUM 131* 132* 134*   POTASSIUM 4.4 4.2 4.1   CHLORIDE 95* 98 100   CO2 26.7 27.0 27.5   ANION GAP 9.3 7.0 6.5   BUN 23 16 15   CREATININE 1.52* 1.33* 1.29*   EGFR 46.6* 54.7* 56.8*   GLUCOSE 112* 116* 93   CALCIUM 9.0  8.9 8.4*   MAGNESIUM  --  1.9 2.1   PHOSPHORUS 3.3 3.4 2.7         Lab 10/01/24  0453   ALBUMIN 3.2*         Lab 10/01/24  0453   PROBNP 656.8             Lab 09/28/24  0444   IRON 25*   IRON SATURATION (TSAT) 9*   TIBC 267*   TRANSFERRIN 179*         Brief Urine Lab Results       None          Microbiology Results (last 10 days)       Procedure Component Value - Date/Time    COVID PRE-OP / PRE-PROCEDURE SCREENING ORDER (NO ISOLATION) - Swab, Nasal Cavity [[id number]]  (Abnormal) Collected: 10/03/24 0835    Lab Status: Final result Specimen: Swab from Nasal Cavity Updated: 10/03/24 1936    Narrative:      The following orders were created for panel order COVID PRE-OP / PRE-PROCEDURE SCREENING ORDER (NO ISOLATION) - Swab, Nasal Cavity.  Procedure                               Abnormality         Status                     ---------                               -----------         ------                     COVID-19,CEPHEID/RENETTA,CO...[[id number]]  Abnormal            Final result                 Please view results for these tests on the individual orders.    COVID-19,CEPHEID/RENETTA,COR/ARLEEN/PAD/JULIAN/LAG/TOYA IN-HOUSE,NP SWAB IN TRANSPORT MEDIA 1 HR TAT, RT-PCR - Swab, Nasopharynx [[id number]]  (Abnormal) Collected: 10/03/24 0835    Lab Status: Final result Specimen: Swab from Nasopharynx Updated: 10/03/24 1936     COVID19 Detected    Narrative:      Fact sheet for providers: https://www.fda.gov/media/410175/download     Fact sheet for patients: https://www.fda.gov/media/950541/download  Fact sheet for providers: https://www.fda.gov/media/578686/download     Fact sheet for patients: https://www.fda.gov/media/789340/download    COVID PRE-OP / PRE-PROCEDURE SCREENING ORDER (NO ISOLATION) - Swab, Nasal Cavity [[id number]]  (Normal) Collected: 09/30/24 0847    Lab Status: Final result Specimen: Swab from Nasal Cavity Updated: 09/30/24 1325    Narrative:      The following orders were created for panel order COVID PRE-OP /  PRE-PROCEDURE SCREENING ORDER (NO ISOLATION) - Swab, Nasal Cavity.  Procedure                               Abnormality         Status                     ---------                               -----------         ------                     COVID-19,CEPHEID/RENETTA,CO...[[id number]]  Normal              Final result                 Please view results for these tests on the individual orders.    COVID-19,CEPHEID/RENETTA,COR/ARLEEN/PAD/JULIAN/LAG/TOYA IN-HOUSE,NP SWAB IN TRANSPORT MEDIA 1 HR TAT, RT-PCR - Swab, Nasopharynx [[id number]]  (Normal) Collected: 09/30/24 0847    Lab Status: Final result Specimen: Swab from Nasopharynx Updated: 09/30/24 1325     COVID19 Not Detected    Narrative:      Fact sheet for providers: https://www.fda.gov/media/703216/download     Fact sheet for patients: https://www.fda.gov/media/803545/download  Fact sheet for providers: https://www.fda.gov/media/798092/download     Fact sheet for patients: https://www.fda.gov/media/048792/download    COVID PRE-OP / PRE-PROCEDURE SCREENING ORDER (NO ISOLATION) - Swab, Nasopharynx [[id number]]  (Normal) Collected: 09/27/24 1620    Lab Status: Final result Specimen: Swab from Nasopharynx Updated: 09/27/24 1700    Narrative:      The following orders were created for panel order COVID PRE-OP / PRE-PROCEDURE SCREENING ORDER (NO ISOLATION) - Swab, Nasopharynx.  Procedure                               Abnormality         Status                     ---------                               -----------         ------                     COVID-19, FLU A/B, RSV P...[[id number]]  Normal              Final result                 Please view results for these tests on the individual orders.    COVID-19, FLU A/B, RSV PCR 1 HR TAT - Swab, Nasopharynx [[id number]]  (Normal) Collected: 09/27/24 1620    Lab Status: Final result Specimen: Swab from Nasopharynx Updated: 09/27/24 1700     COVID19 Not Detected     Influenza A PCR Not Detected     Influenza B PCR Not Detected     RSV, PCR  Not Detected    Narrative:      Fact sheet for providers: https://www.fda.gov/media/932142/download    Fact sheet for patients: https://www.fda.gov/media/381471/download    Test performed by PCR.            No radiology results for the last 7 days     Results for orders placed during the hospital encounter of 11/17/22    Duplex Carotid Ultrasound CAR    Interpretation Summary    Proximal right internal carotid artery severe stenosis (80 to 99%)..    No significant left carotid bifurcation stenosis.    There is bilateral carotid bulb plaque.    Vertebral flow is antegrade bilaterally.    Clinical and/or angiographic correlation is advised regarding the findings in the right internal carotid artery.      Results for orders placed during the hospital encounter of 11/17/22    Duplex Carotid Ultrasound CAR    Interpretation Summary    Proximal right internal carotid artery severe stenosis (80 to 99%)..    No significant left carotid bifurcation stenosis.    There is bilateral carotid bulb plaque.    Vertebral flow is antegrade bilaterally.    Clinical and/or angiographic correlation is advised regarding the findings in the right internal carotid artery.          Labs Pending at Discharge: No labs pending        Time spent on Discharge including face to face service:  33 minutes    Electronically signed by ANTONIO Mcgrath, 10/03/24, 9:34 PM EDT.

## 2024-10-05 VITALS
DIASTOLIC BLOOD PRESSURE: 51 MMHG | RESPIRATION RATE: 18 BRPM | BODY MASS INDEX: 20.94 KG/M2 | TEMPERATURE: 97.9 F | WEIGHT: 136.91 LBS | HEART RATE: 78 BPM | OXYGEN SATURATION: 98 % | SYSTOLIC BLOOD PRESSURE: 134 MMHG

## 2024-10-05 PROBLEM — R53.1 GENERALIZED WEAKNESS: Status: RESOLVED | Noted: 2024-10-03 | Resolved: 2024-10-05

## 2024-10-05 PROBLEM — D89.831 CYTOKINE RELEASE SYNDROME, GRADE 1: Status: ACTIVE | Noted: 2024-10-05

## 2024-10-05 LAB
ALBUMIN SERPL-MCNC: 3 G/DL (ref 3.5–5.2)
ALP SERPL-CCNC: 164 U/L (ref 39–117)
ALT SERPL W P-5'-P-CCNC: 62 U/L (ref 1–41)
ANION GAP SERPL CALCULATED.3IONS-SCNC: 7.9 MMOL/L (ref 5–15)
AST SERPL-CCNC: 62 U/L (ref 1–40)
BASOPHILS # BLD AUTO: 0.03 10*3/MM3 (ref 0–0.2)
BASOPHILS NFR BLD AUTO: 0.6 % (ref 0–1.5)
BILIRUB CONJ SERPL-MCNC: 0.2 MG/DL (ref 0–0.3)
BILIRUB INDIRECT SERPL-MCNC: 0 MG/DL
BILIRUB SERPL-MCNC: 0.2 MG/DL (ref 0–1.2)
BUN SERPL-MCNC: 25 MG/DL (ref 8–23)
BUN/CREAT SERPL: 20.7 (ref 7–25)
CALCIUM SPEC-SCNC: 8.6 MG/DL (ref 8.6–10.5)
CHLORIDE SERPL-SCNC: 100 MMOL/L (ref 98–107)
CO2 SERPL-SCNC: 25.1 MMOL/L (ref 22–29)
CREAT SERPL-MCNC: 1.21 MG/DL (ref 0.76–1.27)
DEPRECATED RDW RBC AUTO: 52.1 FL (ref 37–54)
EGFRCR SERPLBLD CKD-EPI 2021: 61.3 ML/MIN/1.73
EOSINOPHIL # BLD AUTO: 0.29 10*3/MM3 (ref 0–0.4)
EOSINOPHIL NFR BLD AUTO: 5.8 % (ref 0.3–6.2)
ERYTHROCYTE [DISTWIDTH] IN BLOOD BY AUTOMATED COUNT: 15.8 % (ref 12.3–15.4)
GLUCOSE SERPL-MCNC: 126 MG/DL (ref 65–99)
HCT VFR BLD AUTO: 26.1 % (ref 37.5–51)
HGB BLD-MCNC: 8.4 G/DL (ref 13–17.7)
IMM GRANULOCYTES # BLD AUTO: 0.05 10*3/MM3 (ref 0–0.05)
IMM GRANULOCYTES NFR BLD AUTO: 1 % (ref 0–0.5)
LYMPHOCYTES # BLD AUTO: 1.16 10*3/MM3 (ref 0.7–3.1)
LYMPHOCYTES NFR BLD AUTO: 23.2 % (ref 19.6–45.3)
MAGNESIUM SERPL-MCNC: 1.8 MG/DL (ref 1.6–2.4)
MCH RBC QN AUTO: 29.2 PG (ref 26.6–33)
MCHC RBC AUTO-ENTMCNC: 32.2 G/DL (ref 31.5–35.7)
MCV RBC AUTO: 90.6 FL (ref 79–97)
MONOCYTES # BLD AUTO: 0.44 10*3/MM3 (ref 0.1–0.9)
MONOCYTES NFR BLD AUTO: 8.8 % (ref 5–12)
NEUTROPHILS NFR BLD AUTO: 3.02 10*3/MM3 (ref 1.7–7)
NEUTROPHILS NFR BLD AUTO: 60.6 % (ref 42.7–76)
NRBC BLD AUTO-RTO: 0 /100 WBC (ref 0–0.2)
PHOSPHATE SERPL-MCNC: 3.1 MG/DL (ref 2.5–4.5)
PLATELET # BLD AUTO: 369 10*3/MM3 (ref 140–450)
PMV BLD AUTO: 9.6 FL (ref 6–12)
POTASSIUM SERPL-SCNC: 4 MMOL/L (ref 3.5–5.2)
PROT SERPL-MCNC: 5.8 G/DL (ref 6–8.5)
RBC # BLD AUTO: 2.88 10*6/MM3 (ref 4.14–5.8)
SODIUM SERPL-SCNC: 133 MMOL/L (ref 136–145)
WBC NRBC COR # BLD AUTO: 4.99 10*3/MM3 (ref 3.4–10.8)

## 2024-10-05 PROCEDURE — 99239 HOSP IP/OBS DSCHRG MGMT >30: CPT | Performed by: FAMILY MEDICINE

## 2024-10-05 PROCEDURE — 85025 COMPLETE CBC W/AUTO DIFF WBC: CPT | Performed by: FAMILY MEDICINE

## 2024-10-05 PROCEDURE — 83735 ASSAY OF MAGNESIUM: CPT | Performed by: FAMILY MEDICINE

## 2024-10-05 PROCEDURE — 80076 HEPATIC FUNCTION PANEL: CPT | Performed by: FAMILY MEDICINE

## 2024-10-05 PROCEDURE — 80048 BASIC METABOLIC PNL TOTAL CA: CPT | Performed by: FAMILY MEDICINE

## 2024-10-05 PROCEDURE — 84100 ASSAY OF PHOSPHORUS: CPT | Performed by: FAMILY MEDICINE

## 2024-10-05 RX ADMIN — PANTOPRAZOLE SODIUM 40 MG: 40 TABLET, DELAYED RELEASE ORAL at 09:08

## 2024-10-05 RX ADMIN — HYDROCODONE BITARTRATE AND ACETAMINOPHEN 1 TABLET: 5; 325 TABLET ORAL at 00:53

## 2024-10-05 RX ADMIN — FERROUS SULFATE TAB 325 MG (65 MG ELEMENTAL FE) 325 MG: 325 (65 FE) TAB at 09:09

## 2024-10-05 RX ADMIN — LEVOTHYROXINE SODIUM 137 MCG: 0.11 TABLET ORAL at 06:34

## 2024-10-05 RX ADMIN — FINASTERIDE 5 MG: 5 TABLET, FILM COATED ORAL at 09:08

## 2024-10-05 RX ADMIN — MEMANTINE 5 MG: 10 TABLET ORAL at 09:08

## 2024-10-05 RX ADMIN — METOPROLOL SUCCINATE 25 MG: 25 TABLET, FILM COATED, EXTENDED RELEASE ORAL at 09:09

## 2024-10-05 RX ADMIN — TICAGRELOR 90 MG: 90 TABLET ORAL at 09:08

## 2024-10-05 RX ADMIN — TAMSULOSIN HYDROCHLORIDE 0.4 MG: 0.4 CAPSULE ORAL at 09:08

## 2024-10-05 NOTE — PLAN OF CARE
Goal Outcome Evaluation:           Progress: improving  Outcome Evaluation: Pt AOx4 throughout the shift, intermittent forgetfullness, and x1 assist to the BSC. Pt complaint of pain once this shift, medicated per MAR. Contact and Airborne precuations maintained per orders. Almeida catheter patency maintained. Pt has no IV, MD aware. VSS, no acute changes noted this shift. Pt appears to be in no apparent distress at this time, denies any current needs, and has call light within reach and bed alarm activated.

## 2024-10-05 NOTE — DISCHARGE SUMMARY
[facility]         HOSPITALIST  DISCHARGE SUMMARY    Patient Name: Dixon Amador  : 1945  MRN: [id number]    Date of Admission: 10/3/2024  Date of Discharge:  10/5/2024    Primary Care Physician: Shayy Galaviz APRN    Consults       No orders found from 2024 to 10/4/2024.            Active and Resolved Hospital Problems:    COVID positive   Hx of gross hematuria   Status post cystoscopy with clot evacuation and fulguration of bleeding vessels 2024  Severe prostatomegaly on CT  Urinary retention  UTI from unspecified organism  Paroxysmal atrial fibrillation on Eliquis... Currently on hold given recurrent gross hematuria  CAD with previous PCI most recently 2024, remains on Brilinta  Peripheral vascular disease  Severe right ICA stenosis   Hypertension  Hypothyroidism  Iron deficiency anemia  History of head neck cancer status posttreatment  Chronic dysphagia/aspiration refusing PEG placement    Active Hospital Problems    Diagnosis POA    Cytokine release syndrome, grade 1 [D89.831] No      Resolved Hospital Problems    Diagnosis POA    **Generalized weakness [R53.1] Yes       Hospital Course     Hospital Course:    78-year-old male with history of CAD, RA, polycythemia, hypertension, GERD without esophagitis, paroxysmal atrial fibrillation, long-term anticoagulation with Eliquis, hospitalized recently for hematuria requiring urological intervention.  Postprocedure, Brilinta was resumed, Eliquis held, transferred to rehab for rehabilitation services.  While in rehab, per protocol patient was tested for COVID-19, unfortunately tested positive, rehospitalized to the acute side for further evaluation and monitoring of COVID-19 positive testing from a skilled facility.  Upon evaluation initially, patient had no symptoms of cough or shortness of breath, satting well on room air, he had a Almeida catheter in place which showed no signs of hematuria.  The patient was monitored  for decompensate of symptoms related to COVID-19 infection.  Not seem to be having any issues related to COVID-19.  Remains intermittently forgetful.  Almeida catheter without hematuria.  Discharged in hemodynamically stable addition to pursue rehab.  To follow-up with urology as outpatient.      Day of Discharge     Vital Signs:  Temp:  [97.7 °F (36.5 °C)-98.4 °F (36.9 °C)] 97.7 °F (36.5 °C)  Heart Rate:  [68-81] 68  Resp:  [16-20] 16  BP: (122-146)/(60-75) 146/75  Review of systems:  All systems reviewed negative for weakness, fatigue, forgetfulness    PHYSICAL EXAM        CON:   WN. WD. NAD.   NECK:             No thyromegaly. No stridor. Trachea midline.  RESP:             CTA. No wheezes. No crackles.  No work of breathing or tachypnea.   CV:      Rhythm regular. Rate WNL. No murmur noted.  No edema.  GI:                   Soft and nontender. Nondistended.  EXT:    Peripheral pulses intact.  No joint deformities or cyanosis.  PSYCH:           Alert. Oriented. Normal affect and mood.  NEURO:          No dysarthria or aphasia. No unilateral weakness or paresthesia.  SKIN:   No chronic venous stasis changes or varicosities.  No cellulitis        Discharge Details        Discharge Medications        Continue These Medications        Instructions Start Date   ezetimibe 10 MG tablet  Commonly known as: ZETIA   10 mg, Oral, Daily      ferrous sulfate 325 (65 FE) MG tablet  Commonly known as: FerrouSul   325 mg, Oral, Daily With Breakfast      finasteride 5 MG tablet  Commonly known as: PROSCAR   5 mg, Oral, Daily      furosemide 20 MG tablet  Commonly known as: LASIX   10 mg, Oral, Daily      HYDROcodone-acetaminophen 7.5-325 MG per tablet  Commonly known as: NORCO   1 tablet, Oral, Every 6 Hours PRN      hydrOXYzine 50 MG tablet  Commonly known as: ATARAX   50 mg, Oral, Nightly PRN      levothyroxine 137 MCG tablet  Commonly known as: SYNTHROID, LEVOTHROID   137 mcg, Oral, Daily      losartan 100 MG tablet  Commonly  known as: COZAAR   50 mg, Oral, 2 Times Daily      memantine 5 MG tablet  Commonly known as: NAMENDA   5 mg, Oral, 2 Times Daily      metoprolol succinate XL 50 MG 24 hr tablet  Commonly known as: TOPROL-XL   50 mg, Oral, Daily      pantoprazole 40 MG EC tablet  Commonly known as: PROTONIX   40 mg, Oral, Daily      tamsulosin 0.4 MG capsule 24 hr capsule  Commonly known as: FLOMAX   0.4 mg, Oral, Daily      ticagrelor 90 MG tablet tablet  Commonly known as: Brilinta   90 mg, Oral, 2 Times Daily             Stop These Medications      amLODIPine 5 MG tablet  Commonly known as: NORVASC              Allergies   Allergen Reactions    Atorvastatin Unknown - Low Severity    Codeine Unknown - Low Severity     causes delium  causes delium         Discharge Disposition:  Rehab Facility or Unit (DC - External)    Diet:  Hospital:  Diet Order   Procedures    Diet: Regular/House; Fluid Consistency: Thin (IDDSI 0)       Discharge Activity: as tolerates      CODE STATUS:  Code Status and Medical Interventions: No CPR (Do Not Attempt to Resuscitate); Limited Support; No intubation (DNI)   Ordered at: 10/03/24 2148     Medical Intervention Limits:    No intubation (DNI)     Level Of Support Discussed With:    Patient     Code Status (Patient has no pulse and is not breathing):    No CPR (Do Not Attempt to Resuscitate)     Medical Interventions (Patient has pulse or is breathing):    Limited Support         Future Appointments   Date Time Provider Department Center   10/28/2024  9:00 AM Miguel Angel Oviedo MD [facility]       Additional Instructions for the Follow-ups that You Need to Schedule       Discharge Follow-up with PCP   As directed       Currently Documented PCP:    Shayy Galaviz APRN    PCP Phone Number:    [phone]     Follow Up Details: 3 to 7 days                Pertinent  and/or Most Recent Results     PROCEDURES:   XR Chest 1 View    Result Date: 9/17/2024  PORTABLE CHEST;   HISTORY: Fall with laceration,  precordial chest pain. COMPARISON: September 11 2024. FINDINGS: The heart is normal in size.  The mediastinum is unremarkable. There are increased interstitial markings that appear unchanged. The medial left base linear opacities favored to represent scarring. There is no new infiltrate or pleural effusion. There is no pneumothorax.      Increased interstitial markings that appear unchanged. Images reviewed, interpreted, and dictated by Dr. Marcella Arriola. Transcribed by Tamra Savage PA-C.    CT Chest Without Contrast Diagnostic    Result Date: 9/17/2024  CT SCAN OF THE CHEST WITHOUT CONTRAST    9/17/2024 10:58 AM HISTORY: Fall, chest pain. COMPARISON: January 9, 2018. PROCEDURE:  Axial images were obtained from the lung apex to the mid abdomen by computed tomography. This study was performed with techniques to keep radiation doses as low as reasonably achievable, (ALARA). Individualized dose reduction techniques using automated exposure control or adjustment of mA and/or kV according to the patient size were employed. FINDINGS: Lack of intravenous contrast limits evaluation of the solid organs, the mediastinum and the vasculature. CHEST: Atherosclerosis is noted. The heart is upper limits of normal in size. 1.2 cm precarinal lymph node, 1.4 cm prevascular lymph nodes are present. Cholelithiasis is noted. There is evidence calcified granulomatous disease. Mild apical pleural thickening. There is no evidence of pneumothorax. Mild emphysematous change. No pleural effusion. There are scattered areas of atelectasis. There is some anterior wedging of T4 which is age indeterminate, correlate clinically, further assessment with MRI may be of additional benefit.    There is some anterior wedging of T4 which is age indeterminate, correlate clinically, further assessment with MRI may be of additional benefit. Prominent mediastinal lymph nodes, correlate clinically, continued follow-up recommended. Cholelithiasis, consider  ultrasound if clinically warranted. Images reviewed, interpreted, and dictated by Alfredo Neal DO    CTA Neck    Result Date: 9/17/2024  CTA HEAD AND NECK ANGIO WITH CONTRAST 9/17/2024 10:58 AM HISTORY: Dizziness, history of falls. TECHNIQUE: Thin section axial CT with IV contrast supplemented with multiplanar 3 D reconstructions of the head and neck. This study was performed with techniques to keep radiation doses as low as reasonably achievable, (ALARA). Individualized dose reduction techniques using automated exposure control or adjustment of mA and/or kV according to the patient size were employed. FINDINGS: CTA: Aortic arch:  Arch shows no significant narrowing. Great vessel origins are widely patent. Right carotid:  The right common carotid is patent. There is mild plaque at the carotid bulb. Stent noted involving the mid and distal internal carotid on the right appears patent. Left carotid:  There is scattered plaque involving the left common and internal carotid artery without significant stenosis. Vertebrals: The vertebral arteries are diminutive bilaterally. Motion artifact does somewhat limit assessment, there may be areas of stenosis on the right. The origin of the left vertebral artery is not well appreciated. Correlate clinically. The cranial circulation is patent. Diminutive appearance of the left A1 anterior cerebral artery may be developmental.    The vertebral arteries are diminutive bilaterally. Motion artifact does somewhat limit assessment, there may be areas of stenosis on the right. The origin of the left vertebral artery is not well appreciated. Correlate clinically.  No large vessel occlusion in the head, diminutive appearance the left A1 anterior cerebral artery may be developmental. The right common carotid is patent. There is mild plaque at the carotid bulb. Stent noted involving the mid and distal internal carotid on the right appears patent. There is scattered plaque involving the left  common and internal carotid artery without significant stenosis. Images reviewed, interpreted, and dictated by Alfrdeo Neal DO    CT Angiogram Head    Result Date: 9/17/2024  CTA HEAD AND NECK ANGIO WITH CONTRAST 9/17/2024 10:58 AM HISTORY: Dizziness, history of falls. TECHNIQUE: Thin section axial CT with IV contrast supplemented with multiplanar 3 D reconstructions of the head and neck. This study was performed with techniques to keep radiation doses as low as reasonably achievable, (ALARA). Individualized dose reduction techniques using automated exposure control or adjustment of mA and/or kV according to the patient size were employed. FINDINGS: CTA: Aortic arch:  Arch shows no significant narrowing. Great vessel origins are widely patent. Right carotid:  The right common carotid is patent. There is mild plaque at the carotid bulb. Stent noted involving the mid and distal internal carotid on the right appears patent. Left carotid:  There is scattered plaque involving the left common and internal carotid artery without significant stenosis. Vertebrals: The vertebral arteries are diminutive bilaterally. Motion artifact does somewhat limit assessment, there may be areas of stenosis on the right. The origin of the left vertebral artery is not well appreciated. Correlate clinically. The cranial circulation is patent. Diminutive appearance of the left A1 anterior cerebral artery may be developmental.    The vertebral arteries are diminutive bilaterally. Motion artifact does somewhat limit assessment, there may be areas of stenosis on the right. The origin of the left vertebral artery is not well appreciated. Correlate clinically.  No large vessel occlusion in the head, diminutive appearance the left A1 anterior cerebral artery may be developmental. The right common carotid is patent. There is mild plaque at the carotid bulb. Stent noted involving the mid and distal internal carotid on the right appears patent. There  is scattered plaque involving the left common and internal carotid artery without significant stenosis. Images reviewed, interpreted, and dictated by Alfredo Neal DO    CT Abdomen Pelvis Without Contrast    Result Date: 9/17/2024  CT SCAN OF THE PELVIS WITHOUT CONTRAST     9/17/2024 7:48 AM HISTORY: Pelvic trauma fall. PROCEDURE: Axial images were obtained from the iliac crest to the pubic symphysis by computed tomography. This study was performed with techniques to keep radiation doses as low as reasonably achievable, (ALARA). Individualized dose reduction techniques using automated exposure control or adjustment of mA and/or kV according to the patient size were employed. COMPARISON: None. FINDINGS: PELVIS: Bones: No acute displaced pelvic or hip fractures. Moderate degenerative changes of the bilateral SI joints and hip joints. Chondrocalcinosis. Osseous fusion/hyperostosis of the lower lumbar spine posterior elements with the osseous bridging of the facet joints, likely sequela of posterior spinal fusion. Soft tissues: The appendix is not identified but there is no significant inflammation in the right lower quadrant. The urinary bladder is unremarkable. There is no significant fluid or adenopathy. The visualized portions of the GI tract is nonobstructed. Aortic and bilateral iliac atherosclerotic disease present. Enlarged prostate with calcifications in the posterior midline, measuring up to 7 cm in axial dimension.    No acute fracture or malalignment. Severe prostatomegaly, benign prostatic hyperplasia or prostatic carcinoma.. Images personally reviewed, interpreted and dictated by Ruma Boykin.    CT cervical spine without contrast    Result Date: 9/17/2024  HEAD CT     9/17/2024 7:48 AM HISTORY: Head trauma, minor (Age >= 65y) Fall. TECHNIQUE: Multiple axial CT images were performed from the foramen magnum to the vertex. Coronal reformatted images were reconstructed from axial data set.  Individualized dose reduction techniques using automated exposure control or adjustment of mA and/or kV according to the patient size were employed. COMPARISON: CT head is 12/10/2022. FINDINGS: No acute intracranial hemorrhage or large acute cortical infarct. Severe chronic small vessel ischemic white matter changes and generalized cerebral volume loss are present. Ventricles are prominent. No midline shift. The basal cisterns are patent. Intracranial arterial atherosclerotic calcifications.   No skull fracture. The visualized paranasal sinuses and mastoid air cells are clear. Moderate right parieto-occipital scalp laceration, contusion and small hematoma (series 2, image 26; series 5, image 29).    No acute intracranial hemorrhage or large acute cortical infarct. Severe chronic microvascular ischemic white matter changes with global volume loss and ventriculomegaly. Moderate right parieto-occipital scalp laceration, contusion and small hematoma. CRITICAL RESULT: No. COMMUNICATION: Per this written report. Images personally reviewed, interpreted, and dictated by JOCELINE Boykin. CT CERVICAL SPINE     9/17/2024 7:48 AM HISTORY: Head trauma, minor (Age >= 65y) Status post fall with neck pain. PROCEDURE: Axial CT images were obtained from the skull base to the thoracic inlet. Coronal and sagittal reformatted images were generated from the axial dataset. This study was performed with techniques to keep radiation doses as low as reasonably achievable, (ALARA). Individualized dose reduction techniques using automated exposure control or adjustment of mA and/or kV according to the patient size were employed. COMPARISON: None. FINDINGS: There is no acute fracture or malalignment. The facets are normally aligned. Multilevel degenerative changes are present. Grade 1 anterolisthesis of C3 on C4 and C4 on C5. Moderate to severe multilevel degenerative disc disease involving C4-C5, C5-C6, C6-C7 with disc space loss,  endplate sclerosis, and large anterior marginal osteophytes. Moderate atlantoaxial joint degenerative changes. No acute paraspinal abnormality. Limited images of the lung apices are unremarkable. Right mid to distal cervical internal carotid artery stent in place (series 6, image 8). Bilateral carotid bulb atherosclerotic disease present. IMPRESSION: No acute fracture of the cervical spine. Degenerative changes. CRITICAL RESULT: No. COMMUNICATION: Per this written report. Images personally reviewed, interpreted, and dictated by JOCELINE Boykin.     CT Head Without Contrast    Result Date: 9/17/2024  HEAD CT     9/17/2024 7:48 AM HISTORY: Head trauma, minor (Age >= 65y) Fall. TECHNIQUE: Multiple axial CT images were performed from the foramen magnum to the vertex. Coronal reformatted images were reconstructed from axial data set. Individualized dose reduction techniques using automated exposure control or adjustment of mA and/or kV according to the patient size were employed. COMPARISON: CT head is 12/10/2022. FINDINGS: No acute intracranial hemorrhage or large acute cortical infarct. Severe chronic small vessel ischemic white matter changes and generalized cerebral volume loss are present. Ventricles are prominent. No midline shift. The basal cisterns are patent. Intracranial arterial atherosclerotic calcifications.   No skull fracture. The visualized paranasal sinuses and mastoid air cells are clear. Moderate right parieto-occipital scalp laceration, contusion and small hematoma (series 2, image 26; series 5, image 29).    No acute intracranial hemorrhage or large acute cortical infarct. Severe chronic microvascular ischemic white matter changes with global volume loss and ventriculomegaly. Moderate right parieto-occipital scalp laceration, contusion and small hematoma. CRITICAL RESULT: No. COMMUNICATION: Per this written report. Images personally reviewed, interpreted, and dictated by JOCELINE Boykin.  CT CERVICAL SPINE     9/17/2024 7:48 AM HISTORY: Head trauma, minor (Age >= 65y) Status post fall with neck pain. PROCEDURE: Axial CT images were obtained from the skull base to the thoracic inlet. Coronal and sagittal reformatted images were generated from the axial dataset. This study was performed with techniques to keep radiation doses as low as reasonably achievable, (ALARA). Individualized dose reduction techniques using automated exposure control or adjustment of mA and/or kV according to the patient size were employed. COMPARISON: None. FINDINGS: There is no acute fracture or malalignment. The facets are normally aligned. Multilevel degenerative changes are present. Grade 1 anterolisthesis of C3 on C4 and C4 on C5. Moderate to severe multilevel degenerative disc disease involving C4-C5, C5-C6, C6-C7 with disc space loss, endplate sclerosis, and large anterior marginal osteophytes. Moderate atlantoaxial joint degenerative changes. No acute paraspinal abnormality. Limited images of the lung apices are unremarkable. Right mid to distal cervical internal carotid artery stent in place (series 6, image 8). Bilateral carotid bulb atherosclerotic disease present. IMPRESSION: No acute fracture of the cervical spine. Degenerative changes. CRITICAL RESULT: No. COMMUNICATION: Per this written report. Images personally reviewed, interpreted, and dictated by JOCELINE Boykin.     X-ray chest PA and lateral    Result Date: 9/12/2024  TWO-VIEW CHEST    9/11/2024 9:45 PM HISTORY: Cough. COMPARISON:  February 12, 2023 FINDINGS: The cardiac silhouette is proper size. The aortic contours are normal. The mediastinal and hilar structures are unremarkable. The lungs are clear. There is no pneumothorax.    No acute cardiopulmonary process. Images reviewed, interpreted, and dictated by MARLON Juarez M.D.        LAB RESULTS:      Lab 10/05/24  0524 10/04/24  0747 10/01/24  0453   WBC 4.99 6.62 6.70   HEMOGLOBIN 8.4* 8.2* 9.2*    HEMATOCRIT 26.1* 25.5* 28.7*   PLATELETS 369 373 396   NEUTROS ABS 3.02 5.08  --    IMMATURE GRANS (ABS) 0.05 0.02  --    LYMPHS ABS 1.16 0.84  --    MONOS ABS 0.44 0.43  --    EOS ABS 0.29 0.22  --    MCV 90.6 88.5 88.0         Lab 10/05/24  0524 10/04/24  0746 10/01/24  0453   SODIUM 133* 136 131*   POTASSIUM 4.0 4.3 4.4   CHLORIDE 100 103 95*   CO2 25.1 25.4 26.7   ANION GAP 7.9 7.6 9.3   BUN 25* 24* 23   CREATININE 1.21 1.24 1.52*   EGFR 61.3 59.5* 46.6*   GLUCOSE 126* 114* 112*   CALCIUM 8.6 8.7 9.0   MAGNESIUM 1.8 1.8  --    PHOSPHORUS 3.1 3.0 3.3         Lab 10/05/24  0524 10/04/24  0746 10/01/24  0453   TOTAL PROTEIN 5.8* 5.9*  --    ALBUMIN 3.0* 3.0* 3.2*   ALT (SGPT) 62* 57*  --    AST (SGOT) 62* 48*  --    BILIRUBIN 0.2 0.3  --    INDIRECT BILIRUBIN 0.0 0.2  --    BILIRUBIN DIRECT 0.2 0.1  --    ALK PHOS 164* 157*  --          Lab 10/01/24  0453   PROBNP 656.8                 Brief Urine Lab Results       None          Microbiology Results (last 10 days)       Procedure Component Value - Date/Time    COVID PRE-OP / PRE-PROCEDURE SCREENING ORDER (NO ISOLATION) - Swab, Nasal Cavity [[id number]]  (Abnormal) Collected: 10/03/24 0835    Lab Status: Final result Specimen: Swab from Nasal Cavity Updated: 10/03/24 1936    Narrative:      The following orders were created for panel order COVID PRE-OP / PRE-PROCEDURE SCREENING ORDER (NO ISOLATION) - Swab, Nasal Cavity.  Procedure                               Abnormality         Status                     ---------                               -----------         ------                     COVID-19,CEPHEID/RENETTA,CO...[[id number]]  Abnormal            Final result                 Please view results for these tests on the individual orders.    COVID-19,CEPHEID/RENETTA,COR/ARLEEN/PAD/JULIAN/LAG/TOYA IN-HOUSE,NP SWAB IN TRANSPORT MEDIA 1 HR TAT, RT-PCR - Swab, Nasopharynx [[id number]]  (Abnormal) Collected: 10/03/24 0835    Lab Status: Final result Specimen: Swab from  Nasopharynx Updated: 10/03/24 1936     COVID19 Detected    Narrative:      Fact sheet for providers: https://www.fda.gov/media/599608/download     Fact sheet for patients: https://www.fda.gov/media/894794/download  Fact sheet for providers: https://www.fda.gov/media/164573/download     Fact sheet for patients: https://www.fda.gov/media/626799/download    COVID PRE-OP / PRE-PROCEDURE SCREENING ORDER (NO ISOLATION) - Swab, Nasal Cavity [[id number]]  (Normal) Collected: 09/30/24 0847    Lab Status: Final result Specimen: Swab from Nasal Cavity Updated: 09/30/24 1325    Narrative:      The following orders were created for panel order COVID PRE-OP / PRE-PROCEDURE SCREENING ORDER (NO ISOLATION) - Swab, Nasal Cavity.  Procedure                               Abnormality         Status                     ---------                               -----------         ------                     COVID-19,CEPHEID/RENETTA,CO...[[id number]]  Normal              Final result                 Please view results for these tests on the individual orders.    COVID-19,CEPHEID/RENETTA,COR/ARLEEN/PAD/JULIAN/LAG/TOYA IN-HOUSE,NP SWAB IN TRANSPORT MEDIA 1 HR TAT, RT-PCR - Swab, Nasopharynx [[id number]]  (Normal) Collected: 09/30/24 0847    Lab Status: Final result Specimen: Swab from Nasopharynx Updated: 09/30/24 1325     COVID19 Not Detected    Narrative:      Fact sheet for providers: https://www.fda.gov/media/702427/download     Fact sheet for patients: https://www.fda.gov/media/102383/download  Fact sheet for providers: https://www.fda.gov/media/511410/download     Fact sheet for patients: https://www.fda.gov/media/860521/download    COVID PRE-OP / PRE-PROCEDURE SCREENING ORDER (NO ISOLATION) - Swab, Nasopharynx [[id number]]  (Normal) Collected: 09/27/24 1620    Lab Status: Final result Specimen: Swab from Nasopharynx Updated: 09/27/24 1700    Narrative:      The following orders were created for panel order COVID PRE-OP / PRE-PROCEDURE SCREENING ORDER  (NO ISOLATION) - Swab, Nasopharynx.  Procedure                               Abnormality         Status                     ---------                               -----------         ------                     COVID-19, FLU A/B, RSV P...[[id number]]  Normal              Final result                 Please view results for these tests on the individual orders.    COVID-19, FLU A/B, RSV PCR 1 HR TAT - Swab, Nasopharynx [[id number]]  (Normal) Collected: 09/27/24 1620    Lab Status: Final result Specimen: Swab from Nasopharynx Updated: 09/27/24 1700     COVID19 Not Detected     Influenza A PCR Not Detected     Influenza B PCR Not Detected     RSV, PCR Not Detected    Narrative:      Fact sheet for providers: https://www.fda.gov/media/260757/download    Fact sheet for patients: https://www.fda.gov/media/263824/download    Test performed by PCR.            XR Chest 1 View    Result Date: 9/17/2024  Impression: Increased interstitial markings that appear unchanged. Images reviewed, interpreted, and dictated by Dr. Marcella Arriola. Transcribed by Tamra Savage PA-C.    CT Chest Without Contrast Diagnostic    Result Date: 9/17/2024  Impression: There is some anterior wedging of T4 which is age indeterminate, correlate clinically, further assessment with MRI may be of additional benefit. Prominent mediastinal lymph nodes, correlate clinically, continued follow-up recommended. Cholelithiasis, consider ultrasound if clinically warranted. Images reviewed, interpreted, and dictated by Alfredo Neal DO    CTA Neck    Result Date: 9/17/2024  Impression: The vertebral arteries are diminutive bilaterally. Motion artifact does somewhat limit assessment, there may be areas of stenosis on the right. The origin of the left vertebral artery is not well appreciated. Correlate clinically.  No large vessel occlusion in the head, diminutive appearance the left A1 anterior cerebral artery may be developmental. The right common carotid is patent.  There is mild plaque at the carotid bulb. Stent noted involving the mid and distal internal carotid on the right appears patent. There is scattered plaque involving the left common and internal carotid artery without significant stenosis. Images reviewed, interpreted, and dictated by Alfredo Neal DO    CT Angiogram Head    Result Date: 9/17/2024  Impression: The vertebral arteries are diminutive bilaterally. Motion artifact does somewhat limit assessment, there may be areas of stenosis on the right. The origin of the left vertebral artery is not well appreciated. Correlate clinically.  No large vessel occlusion in the head, diminutive appearance the left A1 anterior cerebral artery may be developmental. The right common carotid is patent. There is mild plaque at the carotid bulb. Stent noted involving the mid and distal internal carotid on the right appears patent. There is scattered plaque involving the left common and internal carotid artery without significant stenosis. Images reviewed, interpreted, and dictated by Alfredo Neal DO    CT Abdomen Pelvis Without Contrast    Result Date: 9/17/2024  Impression: No acute fracture or malalignment. Severe prostatomegaly, benign prostatic hyperplasia or prostatic carcinoma.. Images personally reviewed, interpreted and dictated by Joceline Boykin.    CT cervical spine without contrast    Result Date: 9/17/2024  Impression: No acute intracranial hemorrhage or large acute cortical infarct. Severe chronic microvascular ischemic white matter changes with global volume loss and ventriculomegaly. Moderate right parieto-occipital scalp laceration, contusion and small hematoma. CRITICAL RESULT: No. COMMUNICATION: Per this written report. Images personally reviewed, interpreted, and dictated by JOCELINE Boykin. CT CERVICAL SPINE     9/17/2024 7:48 AM HISTORY: Head trauma, minor (Age >= 65y) Status post fall with neck pain. PROCEDURE: Axial CT images were obtained from  the skull base to the thoracic inlet. Coronal and sagittal reformatted images were generated from the axial dataset. This study was performed with techniques to keep radiation doses as low as reasonably achievable, (ALARA). Individualized dose reduction techniques using automated exposure control or adjustment of mA and/or kV according to the patient size were employed. COMPARISON: None. FINDINGS: There is no acute fracture or malalignment. The facets are normally aligned. Multilevel degenerative changes are present. Grade 1 anterolisthesis of C3 on C4 and C4 on C5. Moderate to severe multilevel degenerative disc disease involving C4-C5, C5-C6, C6-C7 with disc space loss, endplate sclerosis, and large anterior marginal osteophytes. Moderate atlantoaxial joint degenerative changes. No acute paraspinal abnormality. Limited images of the lung apices are unremarkable. Right mid to distal cervical internal carotid artery stent in place (series 6, image 8). Bilateral carotid bulb atherosclerotic disease present. IMPRESSION: No acute fracture of the cervical spine. Degenerative changes. CRITICAL RESULT: No. COMMUNICATION: Per this written report. Images personally reviewed, interpreted, and dictated by JOCELINE Boykin.     CT Head Without Contrast    Result Date: 9/17/2024  Impression: No acute intracranial hemorrhage or large acute cortical infarct. Severe chronic microvascular ischemic white matter changes with global volume loss and ventriculomegaly. Moderate right parieto-occipital scalp laceration, contusion and small hematoma. CRITICAL RESULT: No. COMMUNICATION: Per this written report. Images personally reviewed, interpreted, and dictated by JOCELINE Boykin. CT CERVICAL SPINE     9/17/2024 7:48 AM HISTORY: Head trauma, minor (Age >= 65y) Status post fall with neck pain. PROCEDURE: Axial CT images were obtained from the skull base to the thoracic inlet. Coronal and sagittal reformatted images were  generated from the axial dataset. This study was performed with techniques to keep radiation doses as low as reasonably achievable, (ALARA). Individualized dose reduction techniques using automated exposure control or adjustment of mA and/or kV according to the patient size were employed. COMPARISON: None. FINDINGS: There is no acute fracture or malalignment. The facets are normally aligned. Multilevel degenerative changes are present. Grade 1 anterolisthesis of C3 on C4 and C4 on C5. Moderate to severe multilevel degenerative disc disease involving C4-C5, C5-C6, C6-C7 with disc space loss, endplate sclerosis, and large anterior marginal osteophytes. Moderate atlantoaxial joint degenerative changes. No acute paraspinal abnormality. Limited images of the lung apices are unremarkable. Right mid to distal cervical internal carotid artery stent in place (series 6, image 8). Bilateral carotid bulb atherosclerotic disease present. IMPRESSION: No acute fracture of the cervical spine. Degenerative changes. CRITICAL RESULT: No. COMMUNICATION: Per this written report. Images personally reviewed, interpreted, and dictated by JOCELINE Boykin.     X-ray chest PA and lateral    Result Date: 9/12/2024  Impression: No acute cardiopulmonary process. Images reviewed, interpreted, and dictated by MARLON Juarez M.D.      Results for orders placed during the hospital encounter of 11/17/22    Duplex Carotid Ultrasound CAR    Interpretation Summary    Proximal right internal carotid artery severe stenosis (80 to 99%)..    No significant left carotid bifurcation stenosis.    There is bilateral carotid bulb plaque.    Vertebral flow is antegrade bilaterally.    Clinical and/or angiographic correlation is advised regarding the findings in the right internal carotid artery.      Results for orders placed during the hospital encounter of 11/17/22    Duplex Carotid Ultrasound CAR    Interpretation Summary    Proximal right internal  carotid artery severe stenosis (80 to 99%)..    No significant left carotid bifurcation stenosis.    There is bilateral carotid bulb plaque.    Vertebral flow is antegrade bilaterally.    Clinical and/or angiographic correlation is advised regarding the findings in the right internal carotid artery.          Labs Pending at Discharge:        Time spent on Discharge including face to face service:  35 minutes    Electronically signed by Loly Guerra MD, 10/05/24, 10:22 AM EDT.    Portions of this documentation were transcribed electronically from a voice recognition software.  I confirm all data accurately represents the service(s) I performed at today's visit.

## 2024-10-05 NOTE — PLAN OF CARE
Goal Outcome Evaluation:     AOX4 with forgetfulness. Remains on room air. Almeida catheter care completed this shift. Up with x1 assistance. Discharge to [facility] rehab via private vehicle. Discharge instructions provided to wife, Radha, at bedside.

## 2024-10-10 NOTE — PROGRESS NOTES
"Enter Query Response Below      Query Response: UTI was not supported    Electronically signed by Loly Guerra MD, 10/10/24, 7:59 AM EDT.               If applicable, please update the problem list.     Patient: Dixon Amador \"Long\"        : 1945  Account: [id number]           Admit Date: 10/3/2024        How to Respond to this query:       a. Click New Note     b. Answer query within the yellow box.                c. Update the Problem List, if applicable.      If you have any questions about this query contact me at: [email or web address]     Dr. Guerra:    78-year-old moved over 10/3 from skilled bed with positive Covid test.  H&P notes \"Hx of gross hematuria, Status post cystoscopy with clot evacuation and fulguration of bleeding vessels 2024\" Almeida catheter in place on admission.  H&P and discharge summary notes \"UTI from unspecified organism.\" No urinalysis was performed during the admission and no antibiotics were prescribed.     After study, was UTI clinically supported during this admission?    UTI was supported with additional clinical indicators:____________  UTI was not supported  Other- specify_____________  Unable to determine      By submitting this query, we are merely seeking further clarification of documentation to accurately reflect all conditions that you are monitoring, evaluating, treating or that extend the hospitalization or utilize additional resources of care. Please utilize your independent clinical judgment when addressing the question(s) above.     This query and your response, once completed, will be entered into the legal medical record.    Sincerely,  Monica Tabares RN, CCDS  Clinical Documentation Integrity Program     "

## 2024-10-11 ENCOUNTER — TELEPHONE (OUTPATIENT)
Dept: UROLOGY | Facility: CLINIC | Age: 79
End: 2024-10-11
Payer: MEDICARE

## 2024-10-11 NOTE — TELEPHONE ENCOUNTER
Caller: Radha Amador    Relationship to patient: Emergency Contact    Best call back number: [phone]    Chief complaint: WOULD LIKE A SOONER APPT    Type of visit: CYSTOSCOPY    Requested date: ANYDAY BEFORE CURRENT APPT DAY     If rescheduling, when is the original appointment: 10/28/24 9A     Additional notes:OK TO LEAVE A

## 2024-10-14 NOTE — TELEPHONE ENCOUNTER
CALLED PT WIFE BACK, NO ANSWER, LEFT A DETAILED MESSAGE ON HER MACHINE THAT WE DO NOT HAVE ANY SOONER APPT'S AND THAT PT NEEDS TO LEAVE HIS CATHETER IN UNTIL THEN PER ROLANDO.

## 2024-10-24 ENCOUNTER — OFFICE VISIT (OUTPATIENT)
Dept: FAMILY MEDICINE CLINIC | Age: 79
End: 2024-10-24
Payer: MEDICARE

## 2024-10-24 ENCOUNTER — LAB (OUTPATIENT)
Dept: LAB | Facility: HOSPITAL | Age: 79
End: 2024-10-24
Payer: MEDICARE

## 2024-10-24 VITALS
SYSTOLIC BLOOD PRESSURE: 102 MMHG | DIASTOLIC BLOOD PRESSURE: 65 MMHG | TEMPERATURE: 97.8 F | OXYGEN SATURATION: 99 % | HEIGHT: 68 IN | WEIGHT: 136 LBS | HEART RATE: 76 BPM | BODY MASS INDEX: 20.61 KG/M2

## 2024-10-24 DIAGNOSIS — N40.0 BPH WITH ELEVATED PSA: ICD-10-CM

## 2024-10-24 DIAGNOSIS — R97.20 BPH WITH ELEVATED PSA: ICD-10-CM

## 2024-10-24 DIAGNOSIS — N40.0 ENLARGED PROSTATE: ICD-10-CM

## 2024-10-24 DIAGNOSIS — F51.01 PRIMARY INSOMNIA: ICD-10-CM

## 2024-10-24 DIAGNOSIS — I48.91 ATRIAL FIBRILLATION, UNSPECIFIED TYPE: ICD-10-CM

## 2024-10-24 DIAGNOSIS — Z79.01 LONG TERM (CURRENT) USE OF ANTICOAGULANTS: ICD-10-CM

## 2024-10-24 DIAGNOSIS — R29.6 FREQUENT FALLS: ICD-10-CM

## 2024-10-24 DIAGNOSIS — R31.0 GROSS HEMATURIA: Primary | ICD-10-CM

## 2024-10-24 DIAGNOSIS — E03.8 OTHER SPECIFIED HYPOTHYROIDISM: ICD-10-CM

## 2024-10-24 DIAGNOSIS — Z97.8 FOLEY CATHETER IN PLACE: ICD-10-CM

## 2024-10-24 DIAGNOSIS — E61.1 IRON DEFICIENCY: ICD-10-CM

## 2024-10-24 DIAGNOSIS — R74.8 ABNORMAL LIVER ENZYMES: ICD-10-CM

## 2024-10-24 DIAGNOSIS — Z74.09 IMPAIRED MOBILITY: ICD-10-CM

## 2024-10-24 DIAGNOSIS — R33.9 URINARY RETENTION: ICD-10-CM

## 2024-10-24 PROBLEM — N40.1 BENIGN PROSTATIC HYPERPLASIA WITH URINARY RETENTION: Status: ACTIVE | Noted: 2024-10-24

## 2024-10-24 PROBLEM — R33.8 BENIGN PROSTATIC HYPERPLASIA WITH URINARY RETENTION: Status: ACTIVE | Noted: 2024-10-24

## 2024-10-24 LAB
BASOPHILS # BLD AUTO: 0.03 10*3/MM3 (ref 0–0.2)
BASOPHILS NFR BLD AUTO: 0.5 % (ref 0–1.5)
DEPRECATED RDW RBC AUTO: 48.4 FL (ref 37–54)
EOSINOPHIL # BLD AUTO: 0.15 10*3/MM3 (ref 0–0.4)
EOSINOPHIL NFR BLD AUTO: 2.4 % (ref 0.3–6.2)
ERYTHROCYTE [DISTWIDTH] IN BLOOD BY AUTOMATED COUNT: 14.4 % (ref 12.3–15.4)
HCT VFR BLD AUTO: 37.3 % (ref 37.5–51)
HGB BLD-MCNC: 11.4 G/DL (ref 13–17.7)
IMM GRANULOCYTES # BLD AUTO: 0.01 10*3/MM3 (ref 0–0.05)
IMM GRANULOCYTES NFR BLD AUTO: 0.2 % (ref 0–0.5)
LYMPHOCYTES # BLD AUTO: 1.18 10*3/MM3 (ref 0.7–3.1)
LYMPHOCYTES NFR BLD AUTO: 19.1 % (ref 19.6–45.3)
MCH RBC QN AUTO: 27.7 PG (ref 26.6–33)
MCHC RBC AUTO-ENTMCNC: 30.6 G/DL (ref 31.5–35.7)
MCV RBC AUTO: 90.5 FL (ref 79–97)
MONOCYTES # BLD AUTO: 0.41 10*3/MM3 (ref 0.1–0.9)
MONOCYTES NFR BLD AUTO: 6.6 % (ref 5–12)
NEUTROPHILS NFR BLD AUTO: 4.41 10*3/MM3 (ref 1.7–7)
NEUTROPHILS NFR BLD AUTO: 71.2 % (ref 42.7–76)
PLATELET # BLD AUTO: 342 10*3/MM3 (ref 140–450)
PMV BLD AUTO: 10.3 FL (ref 6–12)
RBC # BLD AUTO: 4.12 10*6/MM3 (ref 4.14–5.8)
WBC NRBC COR # BLD AUTO: 6.19 10*3/MM3 (ref 3.4–10.8)

## 2024-10-24 PROCEDURE — 83540 ASSAY OF IRON: CPT

## 2024-10-24 PROCEDURE — 80053 COMPREHEN METABOLIC PANEL: CPT

## 2024-10-24 PROCEDURE — 84443 ASSAY THYROID STIM HORMONE: CPT

## 2024-10-24 PROCEDURE — 36415 COLL VENOUS BLD VENIPUNCTURE: CPT

## 2024-10-24 PROCEDURE — 85025 COMPLETE CBC W/AUTO DIFF WBC: CPT

## 2024-10-24 RX ORDER — FLUTICASONE FUROATE AND VILANTEROL 100; 25 UG/1; UG/1
1 POWDER RESPIRATORY (INHALATION)
COMMUNITY
End: 2024-10-24 | Stop reason: ALTCHOICE

## 2024-10-24 RX ORDER — HYDROXYZINE HYDROCHLORIDE 50 MG/1
50 TABLET, FILM COATED ORAL NIGHTLY PRN
Qty: 90 TABLET | Refills: 3 | Status: SHIPPED | OUTPATIENT
Start: 2024-10-24

## 2024-10-24 RX ORDER — PANTOPRAZOLE SODIUM 40 MG/1
40 TABLET, DELAYED RELEASE ORAL DAILY
Qty: 90 TABLET | Refills: 3 | Status: SHIPPED | OUTPATIENT
Start: 2024-10-24 | End: 2024-10-30 | Stop reason: SDUPTHER

## 2024-10-24 NOTE — PROGRESS NOTES
Procedures       Urinalysis was checked today and was negative for signs of infection      Cytoscopy Procedure:     Procedure: Flexible cytoscope was passed per urethra into the bladder without difficulty after proper consent. The bladder was inspected in a systematic meridian fashion.       5 cm prostate, 1.5 cm median lobe      Catheter reaction, some erythema no signs of pathology      There were no tumors, lesions, stones, or other abnormalities noted within the bladder. Of note, there was no increased vascularity as well. Both ureteral orifices were identified and were normal in appearance. The flexible cytoscope was removed. The patient tolerated the procedure well.       18 Citizen of [place] coudé catheter placed today under sterile technique 15 mL sterile water placed in the balloon placed to straight drainage          Urinary retention  Gross hematuria  BPH      9/24 cardiac clearance - Radu Galvez  - CAD with coronary stent placed in 2/24.  Can hold Eliquis 3 full days before surgery, he may not come off his antiplatelet therapy.  Currently he is on Effient.  Could switch to aspirin rather than Effient if that would help    9/25/2024 cystoscopy clot evacuation -Lohrasbi -prostatic urethra bleeding    9/24  Started on Flomax and finasteride in the hospital, failed voiding trial    9/17/2024 patient had fall    9/17/2024 CT pelvis without - no acute fracture or malignancy.  Severe prostatomegaly.    9/19/2024 ED for gross hematuria with blood in catheter with low abdominal pain.  Catheter placed at [facility] last night for difficulty voiding.    9/19/2024   1.9, GFR 35, H/H 12/37 9/18/2024 UA-nitrite positive 3+ blood    2/23 has seen  urology in the past    No previous urologic surgery  No pelvic radiation        PSA     7/23    5.5          Urinary retention  Gross hematuria  BPH      Continue Flomax and finasteride      Because of the national fluid shortage at this time we will hold off on TURP    Open 1 month  for catheter change at that time we will reassess the situation hopefully be able to get him scheduled    Risks and benefits were discussed including bleeding, infection and damage to the urinary system.  We also discussed the risk of anesthesia up to and including death.  Patient voiced understanding and would like to proceed.      Continue indwelling Almeida catheter
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
## 2024-10-25 LAB
ALBUMIN SERPL-MCNC: 4.1 G/DL (ref 3.5–5.2)
ALBUMIN/GLOB SERPL: 1.4 G/DL
ALP SERPL-CCNC: 115 U/L (ref 39–117)
ALT SERPL W P-5'-P-CCNC: 15 U/L (ref 1–41)
ANION GAP SERPL CALCULATED.3IONS-SCNC: 13 MMOL/L (ref 5–15)
AST SERPL-CCNC: 18 U/L (ref 1–40)
BILIRUB SERPL-MCNC: 0.4 MG/DL (ref 0–1.2)
BUN SERPL-MCNC: 24 MG/DL (ref 8–23)
BUN/CREAT SERPL: 19.4 (ref 7–25)
CALCIUM SPEC-SCNC: 9.8 MG/DL (ref 8.6–10.5)
CHLORIDE SERPL-SCNC: 103 MMOL/L (ref 98–107)
CO2 SERPL-SCNC: 27 MMOL/L (ref 22–29)
CREAT SERPL-MCNC: 1.24 MG/DL (ref 0.76–1.27)
EGFRCR SERPLBLD CKD-EPI 2021: 59.5 ML/MIN/1.73
GLOBULIN UR ELPH-MCNC: 2.9 GM/DL
GLUCOSE SERPL-MCNC: 93 MG/DL (ref 65–99)
IRON 24H UR-MRATE: 20 MCG/DL (ref 59–158)
POTASSIUM SERPL-SCNC: 4.3 MMOL/L (ref 3.5–5.2)
PROT SERPL-MCNC: 7 G/DL (ref 6–8.5)
SODIUM SERPL-SCNC: 143 MMOL/L (ref 136–145)
TSH SERPL DL<=0.05 MIU/L-ACNC: 1.02 UIU/ML (ref 0.27–4.2)

## 2024-10-28 ENCOUNTER — PROCEDURE VISIT (OUTPATIENT)
Dept: UROLOGY | Age: 79
End: 2024-10-28
Payer: MEDICARE

## 2024-10-28 ENCOUNTER — OFFICE VISIT (OUTPATIENT)
Dept: CARDIOLOGY | Facility: CLINIC | Age: 79
End: 2024-10-28
Payer: MEDICARE

## 2024-10-28 ENCOUNTER — TELEPHONE (OUTPATIENT)
Dept: CARDIOLOGY | Facility: CLINIC | Age: 79
End: 2024-10-28
Payer: COMMERCIAL

## 2024-10-28 VITALS
SYSTOLIC BLOOD PRESSURE: 123 MMHG | BODY MASS INDEX: 20.92 KG/M2 | WEIGHT: 138 LBS | RESPIRATION RATE: 16 BRPM | HEIGHT: 68 IN | DIASTOLIC BLOOD PRESSURE: 72 MMHG | HEART RATE: 72 BPM | OXYGEN SATURATION: 99 %

## 2024-10-28 DIAGNOSIS — N40.1 BENIGN PROSTATIC HYPERPLASIA WITH URINARY RETENTION: ICD-10-CM

## 2024-10-28 DIAGNOSIS — R31.0 GROSS HEMATURIA: Primary | ICD-10-CM

## 2024-10-28 DIAGNOSIS — I48.91 ATRIAL FIBRILLATION, UNSPECIFIED TYPE: ICD-10-CM

## 2024-10-28 DIAGNOSIS — R33.8 BENIGN PROSTATIC HYPERPLASIA WITH URINARY RETENTION: ICD-10-CM

## 2024-10-28 DIAGNOSIS — E78.2 HYPERLIPEMIA, MIXED: ICD-10-CM

## 2024-10-28 DIAGNOSIS — I10 HYPERTENSION, ESSENTIAL: ICD-10-CM

## 2024-10-28 DIAGNOSIS — I25.10 CORONARY ARTERY DISEASE INVOLVING NATIVE CORONARY ARTERY OF NATIVE HEART WITHOUT ANGINA PECTORIS: Primary | ICD-10-CM

## 2024-10-28 PROCEDURE — 52000 CYSTOURETHROSCOPY: CPT | Performed by: UROLOGY
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
## 2024-10-28 NOTE — TELEPHONE ENCOUNTER
Left patient a voice mail to call me back. Smiley wants him to come in today for an office visit.

## 2024-10-30 ENCOUNTER — OUTSIDE FACILITY SERVICE (OUTPATIENT)
Dept: FAMILY MEDICINE CLINIC | Age: 79
End: 2024-10-30
Payer: COMMERCIAL

## 2024-10-30 ENCOUNTER — TELEPHONE (OUTPATIENT)
Dept: FAMILY MEDICINE CLINIC | Age: 79
End: 2024-10-30
Payer: COMMERCIAL

## 2024-10-30 ENCOUNTER — TELEPHONE (OUTPATIENT)
Dept: UROLOGY | Age: 79
End: 2024-10-30
Payer: COMMERCIAL

## 2024-10-30 RX ORDER — ASPIRIN 81 MG/1
81 TABLET, CHEWABLE ORAL DAILY
COMMUNITY
End: 2024-10-30 | Stop reason: SDUPTHER

## 2024-10-30 RX ORDER — PANTOPRAZOLE SODIUM 40 MG/1
40 TABLET, DELAYED RELEASE ORAL DAILY
Qty: 30 TABLET | Refills: 0 | Status: SHIPPED | OUTPATIENT
Start: 2024-10-30 | End: 2024-11-01

## 2024-10-30 RX ORDER — FINASTERIDE 5 MG/1
5 TABLET, FILM COATED ORAL DAILY
Start: 2024-10-30

## 2024-10-30 RX ORDER — ASPIRIN 81 MG/1
81 TABLET, CHEWABLE ORAL DAILY
Start: 2024-10-30

## 2024-10-30 RX ORDER — TAMSULOSIN HYDROCHLORIDE 0.4 MG/1
0.4 CAPSULE ORAL DAILY
Start: 2024-10-30

## 2024-10-30 NOTE — TELEPHONE ENCOUNTER
KP FROM [facility] CALLED WANTING TO KNOW IF THERE ARE ANY ORDERS FOR PATIENT. IF HIS CATH COMES OUT OR NEEDS TO BE FLUSHED ARE THEY ABLE TO DO IT OR DOES HE NEED TO CALL OUR OFFICE TO BE SEEN?      [phone]

## 2024-10-31 NOTE — TELEPHONE ENCOUNTER
Osiris called back.  I gave her verbal orders for catheter care as needed including flushing, irrigation, and re-insertion.  She verified via readback.

## 2024-11-01 DIAGNOSIS — Z79.01 LONG TERM (CURRENT) USE OF ANTICOAGULANTS: ICD-10-CM

## 2024-11-01 RX ORDER — PANTOPRAZOLE SODIUM 40 MG/1
40 TABLET, DELAYED RELEASE ORAL DAILY
Qty: 90 TABLET | Refills: 0 | Status: SHIPPED | OUTPATIENT
Start: 2024-11-01

## 2024-11-13 ENCOUNTER — LAB REQUISITION (OUTPATIENT)
Dept: LAB | Facility: HOSPITAL | Age: 79
End: 2024-11-13
Payer: COMMERCIAL

## 2024-11-13 ENCOUNTER — TELEPHONE (OUTPATIENT)
Dept: CARDIOLOGY | Facility: CLINIC | Age: 79
End: 2024-11-13
Payer: COMMERCIAL

## 2024-11-13 ENCOUNTER — TELEPHONE (OUTPATIENT)
Dept: FAMILY MEDICINE CLINIC | Age: 79
End: 2024-11-13
Payer: COMMERCIAL

## 2024-11-13 DIAGNOSIS — R53.83 OTHER FATIGUE: ICD-10-CM

## 2024-11-13 DIAGNOSIS — R82.90 UNSPECIFIED ABNORMAL FINDINGS IN URINE: ICD-10-CM

## 2024-11-13 LAB
BACTERIA UR QL AUTO: ABNORMAL /HPF
BILIRUB UR QL STRIP: NEGATIVE
CLARITY UR: ABNORMAL
COLOR UR: YELLOW
GLUCOSE UR STRIP-MCNC: NEGATIVE MG/DL
HGB UR QL STRIP.AUTO: ABNORMAL
KETONES UR QL STRIP: NEGATIVE
LEUKOCYTE ESTERASE UR QL STRIP.AUTO: ABNORMAL
MUCOUS THREADS URNS QL MICRO: ABNORMAL /HPF
NITRITE UR QL STRIP: NEGATIVE
PH UR STRIP.AUTO: 8.5 [PH] (ref 5–8)
PROT UR QL STRIP: ABNORMAL
RBC # UR STRIP: ABNORMAL /HPF
REF LAB TEST METHOD: ABNORMAL
SP GR UR STRIP: 1.02 (ref 1–1.03)
SQUAMOUS #/AREA URNS HPF: ABNORMAL /HPF
TRI-PHOS CRY URNS QL MICRO: ABNORMAL /HPF
UROBILINOGEN UR QL STRIP: ABNORMAL
WBC # UR STRIP: ABNORMAL /HPF

## 2024-11-13 PROCEDURE — 81001 URINALYSIS AUTO W/SCOPE: CPT | Performed by: UROLOGY

## 2024-11-13 PROCEDURE — 87086 URINE CULTURE/COLONY COUNT: CPT | Performed by: UROLOGY

## 2024-11-13 NOTE — TELEPHONE ENCOUNTER
Osiris from home health called and pt is not acting right and hypotensive 88/59 82/60 I directed Osiris to have pt go to the ER , she 101/57 and ortho lying 120/61 sit 119/84 standing 107/64 she informed cardiology , and urology and they gave an order to get a urine . she said pt would refuse the er . I spoke with pt and informed him that he needs to go to the er he said I think I will be alright . He had not taken medication today she is holding b/p meds until hearing back from cardiology . I advised again pt needs to go to the er .

## 2024-11-13 NOTE — TELEPHONE ENCOUNTER
KARIN Newell from [facility]. Patient got up to answer the door and became dizzy. Patient is weak. Dizziness resolved after sitting blood pressure 82/60 manually HR 75. Patient has not taken medication today. Rechecked blood pressure 101/57 HR 75. States this is the first time it has happened. Patient denies any chest pain or shortness of breath. Nurse would like to know if patient should hold metoprolol succinate 50 mg daily and losartan 100 mg 0.5 twice a day.    Advised to keep blood pressure log twice a day and turn in for review. Nurse to check orthostatic blood pressure. Advised to stay well hydrated and change positions slowly.

## 2024-11-14 LAB — BACTERIA SPEC AEROBE CULT: NORMAL

## 2024-11-14 NOTE — TELEPHONE ENCOUNTER
Can hold losartan if systolic blood pressure is less than 100.  Otherwise continue same medications and keep a blood pressure log and send in for review and will make further adjustments from there if necessary.

## 2024-11-14 NOTE — TELEPHONE ENCOUNTER
Attempted to call patient. No answer. Detailed VM left with return call information provided if any further questions or concerns.

## 2024-11-15 ENCOUNTER — TELEPHONE (OUTPATIENT)
Dept: FAMILY MEDICINE CLINIC | Age: 79
End: 2024-11-15
Payer: COMMERCIAL

## 2024-11-15 NOTE — TELEPHONE ENCOUNTER
Caller: MAGNOLIA - CARSON    Relationship: Home Health    Best call back number:       How would you like to receive the form or medical records (pick-up, mail, fax):  FAX    ATTN: CARSON       If fax, what is the fax number:        Timeframe paperwork needed: ASAP     Additional notes:     [facility] SAID THEY ARE NEEDING THE OFFICE NOTES FOR THE APPOINTMENT ON 10/24/24

## 2024-11-18 DIAGNOSIS — E78.5 HYPERLIPIDEMIA, UNSPECIFIED HYPERLIPIDEMIA TYPE: ICD-10-CM

## 2024-11-19 RX ORDER — EZETIMIBE 10 MG/1
10 TABLET ORAL DAILY
Qty: 90 TABLET | Refills: 0 | Status: SHIPPED | OUTPATIENT
Start: 2024-11-19

## 2024-11-22 NOTE — PROGRESS NOTES
Chief Complaint: Urologic complaint    Subjective         History of Present Illness  Dixon Amador is a 79 y.o. male         Urinary retention  Gross hematuria  BPH       Catheter draining okay, not painful    Doing okay.      10/24 cystoscopy - 5 cm prostate, 1.5 cm median lobe.  No pathology some catheter reaction -18 Azeri coudé placed within the procedure       Currently off Brilinta          Previous      9/24 cardiac clearance - Radu Galvez  - CAD with coronary stent placed in 2/24.  Can hold Eliquis 3 full days before surgery, he may not come off his antiplatelet therapy.  Currently he is on Effient.  Could switch to aspirin rather than Effient if that would help       9/25/2024 cystoscopy clot evacuation -[facility] -prostatic urethra bleeding     9/24  Started on Flomax and finasteride in the hospital, failed voiding trial     9/17/2024 patient had fall     9/17/2024 CT pelvis without - no acute fracture or malignancy.  Severe prostatomegaly.     9/19/2024 ED for gross hematuria with blood in catheter with low abdominal pain.  Catheter placed at [facility] last night for difficulty voiding.     9/19/2024   1.9, GFR 35, H/H 12/37 9/18/2024 UA-nitrite positive 3+ blood     2/23 has seen  urology in the past     No previous urologic surgery  No pelvic radiation        PSA     7/23    5.5                  Objective           Vital Signs:   There were no vitals taken for this visit.               Assessment and Plan    Diagnoses and all orders for this visit:    1. Benign prostatic hyperplasia with urinary retention (Primary)            Urinary retention  Gross hematuria  BPH        Continue Flomax and finasteride, refill Flomax today       Cardiology clearance for TURP.      Discussed with surgery manager today hopefully will have more fluids at the end of the year I will see him back in 1 month for catheter change and at that time hopefully get him scheduled for surgery he is wanting TURP       Risks and  benefits were discussed including bleeding, infection and damage to the urinary system.  We also discussed the risk of anesthesia up to and including death.  Patient voiced understanding and would like to proceed.        Catheter changed today under sterile technique 18 Hungarian coudé    Continue indwelling Almeida catheter

## 2024-11-26 ENCOUNTER — OFFICE VISIT (OUTPATIENT)
Dept: UROLOGY | Age: 79
End: 2024-11-26
Payer: MEDICARE

## 2024-11-26 VITALS — WEIGHT: 138 LBS | BODY MASS INDEX: 20.92 KG/M2 | HEIGHT: 68 IN

## 2024-11-26 DIAGNOSIS — R33.8 BENIGN PROSTATIC HYPERPLASIA WITH URINARY RETENTION: Primary | ICD-10-CM

## 2024-11-26 DIAGNOSIS — N40.1 BENIGN PROSTATIC HYPERPLASIA WITH URINARY RETENTION: Primary | ICD-10-CM

## 2024-11-26 RX ORDER — TICAGRELOR 90 MG/1
1 TABLET ORAL EVERY 12 HOURS SCHEDULED
COMMUNITY
Start: 2024-11-17 | End: 2024-12-02

## 2024-12-02 ENCOUNTER — OFFICE VISIT (OUTPATIENT)
Dept: CARDIOLOGY | Facility: CLINIC | Age: 79
End: 2024-12-02
Payer: MEDICARE

## 2024-12-02 VITALS
SYSTOLIC BLOOD PRESSURE: 143 MMHG | OXYGEN SATURATION: 97 % | BODY MASS INDEX: 21.82 KG/M2 | WEIGHT: 144 LBS | DIASTOLIC BLOOD PRESSURE: 73 MMHG | HEIGHT: 68 IN | HEART RATE: 68 BPM

## 2024-12-02 DIAGNOSIS — E78.2 HYPERLIPEMIA, MIXED: ICD-10-CM

## 2024-12-02 DIAGNOSIS — I10 HYPERTENSION, ESSENTIAL: ICD-10-CM

## 2024-12-02 DIAGNOSIS — I48.91 ATRIAL FIBRILLATION, UNSPECIFIED TYPE: ICD-10-CM

## 2024-12-02 DIAGNOSIS — I25.10 CORONARY ARTERY DISEASE INVOLVING NATIVE CORONARY ARTERY OF NATIVE HEART WITHOUT ANGINA PECTORIS: Primary | ICD-10-CM

## 2024-12-02 RX ORDER — TAMSULOSIN HYDROCHLORIDE 0.4 MG/1
0.4 CAPSULE ORAL DAILY
Qty: 30 CAPSULE | Refills: 1 | Status: SHIPPED | OUTPATIENT
Start: 2024-12-02

## 2024-12-02 RX ORDER — ASPIRIN 81 MG/1
81 TABLET, CHEWABLE ORAL DAILY
Qty: 90 TABLET | Refills: 3 | Status: SHIPPED | OUTPATIENT
Start: 2024-12-02

## 2024-12-02 RX ORDER — METHOCARBAMOL 500 MG/1
500 TABLET, FILM COATED ORAL 3 TIMES DAILY
COMMUNITY
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
## 2024-12-06 ENCOUNTER — PREP FOR SURGERY (OUTPATIENT)
Dept: OTHER | Facility: HOSPITAL | Age: 79
End: 2024-12-06
Payer: COMMERCIAL

## 2024-12-06 DIAGNOSIS — N40.1 BENIGN PROSTATIC HYPERPLASIA WITH URINARY OBSTRUCTION: Primary | ICD-10-CM

## 2024-12-06 DIAGNOSIS — N13.8 BENIGN PROSTATIC HYPERPLASIA WITH URINARY OBSTRUCTION: Primary | ICD-10-CM

## 2024-12-06 RX ORDER — SODIUM CHLORIDE 0.9 % (FLUSH) 0.9 %
10 SYRINGE (ML) INJECTION AS NEEDED
OUTPATIENT
Start: 2024-12-06

## 2024-12-06 RX ORDER — SODIUM CHLORIDE 0.9 % (FLUSH) 0.9 %
3 SYRINGE (ML) INJECTION EVERY 12 HOURS SCHEDULED
OUTPATIENT
Start: 2024-12-06

## 2024-12-06 RX ORDER — SODIUM CHLORIDE 9 MG/ML
40 INJECTION, SOLUTION INTRAVENOUS AS NEEDED
OUTPATIENT
Start: 2024-12-06

## 2024-12-11 ENCOUNTER — TELEPHONE (OUTPATIENT)
Dept: UROLOGY | Age: 79
End: 2024-12-11
Payer: COMMERCIAL

## 2024-12-11 DIAGNOSIS — R33.8 ACUTE URINARY RETENTION: Primary | ICD-10-CM

## 2024-12-11 RX ORDER — LEVOFLOXACIN 500 MG/1
500 TABLET, FILM COATED ORAL DAILY
Qty: 5 TABLET | Refills: 0 | Status: SHIPPED | OUTPATIENT
Start: 2024-12-11 | End: 2024-12-16

## 2024-12-11 NOTE — TELEPHONE ENCOUNTER
Based on the patient's history and planned procedure he is moderate stable cardiovascular risk for prostate surgery.    Okay to hold Eliquis 4 days before the surgery, low-dose aspirin is recommended to be continued throughout    Please resume Eliquis when surgically safe postoperatively    No additional cardiac testing is needed at this time    Electronically signed by William Galvez MD, 12/11/24, 10:15 AM EST.

## 2024-12-11 NOTE — TELEPHONE ENCOUNTER
Spoke to patient. I informed patient that we will schedule him for a TURP on jan 2. Patient is okay with this. Patient will come in next week for a cath culture and they asked that I just give them all of the surgery information that day. I informed them that I will do this. Patient coming in on 12/19 at 1130.

## 2024-12-19 ENCOUNTER — CLINICAL SUPPORT (OUTPATIENT)
Dept: UROLOGY | Age: 79
End: 2024-12-19
Payer: MEDICARE

## 2024-12-19 DIAGNOSIS — R33.8 ACUTE URINARY RETENTION: Primary | ICD-10-CM

## 2024-12-19 PROCEDURE — 87186 SC STD MICRODIL/AGAR DIL: CPT | Performed by: UROLOGY

## 2024-12-19 PROCEDURE — 87086 URINE CULTURE/COLONY COUNT: CPT | Performed by: UROLOGY

## 2024-12-19 PROCEDURE — 87077 CULTURE AEROBIC IDENTIFY: CPT | Performed by: UROLOGY

## 2024-12-19 NOTE — PROGRESS NOTES
Procedure   Cath Change, Simple    Date/Time: 12/19/2024 11:22 AM    Performed by: Mamie Heredia RN  Authorized by: Miguel Angel Oviedo MD  Preparation: Patient was prepped and draped in the usual sterile fashion.  Local anesthesia used: no    Anesthesia:  Local anesthesia used: no    Sedation:  Patient sedated: no    Patient tolerance: patient tolerated the procedure well with no immediate complications  Comments: Patient presented to office for a catheter change.  Patient in supine position.  Deflated balloon on existing catheter and removed without concerns.  Using sterile technique cleansed genitalia, inserted a 18 Telugu, instilled 5-10 cc of sterile water into balloon, collected a urine culture, attached catheter bag with urine return.  Patient tolerated well.

## 2024-12-22 LAB — BACTERIA SPEC AEROBE CULT: ABNORMAL

## 2024-12-23 ENCOUNTER — PRE-ADMISSION TESTING (OUTPATIENT)
Dept: PREADMISSION TESTING | Facility: HOSPITAL | Age: 79
End: 2024-12-23
Payer: MEDICARE

## 2024-12-23 VITALS
WEIGHT: 145.06 LBS | TEMPERATURE: 98.7 F | SYSTOLIC BLOOD PRESSURE: 120 MMHG | OXYGEN SATURATION: 99 % | HEIGHT: 68 IN | HEART RATE: 64 BPM | DIASTOLIC BLOOD PRESSURE: 84 MMHG | RESPIRATION RATE: 20 BRPM | BODY MASS INDEX: 21.99 KG/M2

## 2024-12-23 DIAGNOSIS — Z01.818 ENCOUNTER FOR PREADMISSION TESTING: Primary | ICD-10-CM

## 2024-12-23 LAB
ANION GAP SERPL CALCULATED.3IONS-SCNC: 12.7 MMOL/L (ref 5–15)
BASOPHILS # BLD AUTO: 0.04 10*3/MM3 (ref 0–0.2)
BASOPHILS NFR BLD AUTO: 0.6 % (ref 0–1.5)
BUN SERPL-MCNC: 28 MG/DL (ref 8–23)
BUN/CREAT SERPL: 19.7 (ref 7–25)
CALCIUM SPEC-SCNC: 8.9 MG/DL (ref 8.6–10.5)
CHLORIDE SERPL-SCNC: 100 MMOL/L (ref 98–107)
CO2 SERPL-SCNC: 27.3 MMOL/L (ref 22–29)
CREAT SERPL-MCNC: 1.42 MG/DL (ref 0.76–1.27)
DEPRECATED RDW RBC AUTO: 52.1 FL (ref 37–54)
EGFRCR SERPLBLD CKD-EPI 2021: 50.3 ML/MIN/1.73
EOSINOPHIL # BLD AUTO: 0.58 10*3/MM3 (ref 0–0.4)
EOSINOPHIL NFR BLD AUTO: 8.1 % (ref 0.3–6.2)
ERYTHROCYTE [DISTWIDTH] IN BLOOD BY AUTOMATED COUNT: 17 % (ref 12.3–15.4)
GLUCOSE SERPL-MCNC: 82 MG/DL (ref 65–99)
HCT VFR BLD AUTO: 44.4 % (ref 37.5–51)
HGB BLD-MCNC: 14 G/DL (ref 13–17.7)
IMM GRANULOCYTES # BLD AUTO: 0.02 10*3/MM3 (ref 0–0.05)
IMM GRANULOCYTES NFR BLD AUTO: 0.3 % (ref 0–0.5)
LYMPHOCYTES # BLD AUTO: 1.24 10*3/MM3 (ref 0.7–3.1)
LYMPHOCYTES NFR BLD AUTO: 17.3 % (ref 19.6–45.3)
MCH RBC QN AUTO: 26.7 PG (ref 26.6–33)
MCHC RBC AUTO-ENTMCNC: 31.5 G/DL (ref 31.5–35.7)
MCV RBC AUTO: 84.7 FL (ref 79–97)
MONOCYTES # BLD AUTO: 0.41 10*3/MM3 (ref 0.1–0.9)
MONOCYTES NFR BLD AUTO: 5.7 % (ref 5–12)
NEUTROPHILS NFR BLD AUTO: 4.89 10*3/MM3 (ref 1.7–7)
NEUTROPHILS NFR BLD AUTO: 68 % (ref 42.7–76)
NRBC BLD AUTO-RTO: 0 /100 WBC (ref 0–0.2)
PLATELET # BLD AUTO: 253 10*3/MM3 (ref 140–450)
PMV BLD AUTO: 10.2 FL (ref 6–12)
POTASSIUM SERPL-SCNC: 4 MMOL/L (ref 3.5–5.2)
QT INTERVAL: 468 MS
QTC INTERVAL: 484 MS
RBC # BLD AUTO: 5.24 10*6/MM3 (ref 4.14–5.8)
SODIUM SERPL-SCNC: 140 MMOL/L (ref 136–145)
WBC NRBC COR # BLD AUTO: 7.18 10*3/MM3 (ref 3.4–10.8)

## 2024-12-23 PROCEDURE — 93005 ELECTROCARDIOGRAM TRACING: CPT

## 2024-12-23 PROCEDURE — 36415 COLL VENOUS BLD VENIPUNCTURE: CPT

## 2024-12-23 PROCEDURE — 85025 COMPLETE CBC W/AUTO DIFF WBC: CPT

## 2024-12-23 PROCEDURE — 80048 BASIC METABOLIC PNL TOTAL CA: CPT

## 2024-12-23 RX ORDER — LEVOFLOXACIN 500 MG/1
500 TABLET, FILM COATED ORAL DAILY
COMMUNITY

## 2024-12-23 RX ORDER — HYDROCODONE BITARTRATE AND ACETAMINOPHEN 7.5; 325 MG/1; MG/1
1 TABLET ORAL EVERY 8 HOURS PRN
COMMUNITY

## 2024-12-23 NOTE — DISCHARGE INSTRUCTIONS
IMPORTANT INSTRUCTIONS - PRE-ADMISSION TESTING  DO NOT EAT OR CHEW anything after midnight the night before your procedure.    You may have CLEAR liquids up to ___3___ hours prior to ARRIVAL time.   Take the following medications the morning of your procedure with JUST A SIP OF WATER:  _ASPIRIN, EZETIMIBE, METOROLOL, FINASTERIDE, NAMENDA, ROBAXIN, LEVOTHYROXIN PANTOPRAZOLE,     HOLD LOSARTAN, FUROSEMIDE AM OF SURGERY     HOLD ELIQUIS 4 DAYS PRIOR TO SURGERY ______________________________________________________________________________________________________________________________________________________________________________________    DO NOT BRING your medications to the hospital with you, UNLESS something has changed since your PRE-Admission Testing appointment.  Hold all vitamins, supplements, and NSAIDS (Non- steroidal anti-inflammatory meds) for one week prior to surgery (you MAY take Tylenol or Acetaminophen).  If you are diabetic, check your blood sugar the morning of your procedure. If it is less than 70 or if you are feeling symptomatic, call the following number for further instructions: [phone]  Use your inhalers/nebulizers as usual, the morning of your procedure. BRING YOUR INHALERS with you.   Bring your CPAP or BIPAP to hospital, ONLY IF YOU WILL BE SPENDING THE NIGHT.   Make sure you have a ride home and have someone who will stay with you the day of your procedure after you go home.  If you have any questions, please call your Pre-Admission Testing Nurse, _MIYA PISANO R.N.  at [phone].   Per anesthesia request, do not smoke for 24 hours before your procedure or as instructed by your surgeon.    DAY SURGERY COME TO ENTRANCE A, TAKE ELEVATOR A UP TO THE THIRD FLOOR AND WILL GET REGISTERED UP ON THIRD FLOOR.      Clear Liquid Diet        Find out when you need to start a clear liquid diet.   Think of “clear liquids” as anything you could read a newspaper through. This includes things  like water, broth, sports drinks, or tea WITHOUT any kind of milk or cream.           Once you are told to start a clear liquid diet, only drink these things until 2 hours before arrival to the hospital or when the hospital says to stop. Total volume limitation: 8 oz.       Clear liquids you CAN drink:   Water   Clear broth: beef, chicken, vegetable, or bone broth with nothing in it   Gatorade   Lemonade or Juan-aid   Soda   Tea, coffee (NO cream or honey)   Jell-O (without fruit)   Popsicles (without fruit or cream)   Italian ices   Juice without pulp: apple, white, grape   You may use salt, pepper, and sugar    Do NOT drink:   Milk or cream   Soy milk, almond milk, coconut milk, or other non-dairy drinks and   creamers   Milkshakes or smoothies   Tomato juice   Orange juice   Grapefruit juice   Cream soups or any other than broth         Clear Liquid Diet:  Do NOT eat any solid food.  Do NOT eat or suck on mints or candy.  Do NOT chew gum.  Do NOT drink thick liquids like milk or juice with pulp in it.  Do NOT add milk, cream, or anything like soy milk or almond milk to coffee or tea.     PREOPERATIVE (BEFORE SURGERY)              BATHING INSTRUCTIONS  Instructions:    You will need to shower  1 separate times utilizing the soap provided; at the times indicated   below:        Wash your hair and face with normal shampoo and soap, rinse it well before using the surgical soap.      In the shower, wet the skin completely with water from your neck to your feet. Apply the cleanser to your   body ONLY FROM THE NECK TO YOUR FEET.     Do NOT USE THE CLEANSER ON YOUR FACE, HEAD, OR GENITAL (PRIVATE) AREAS.   Keep it out of your eyes, ears, and mouth because of the risk of injury to those areas.      Scrub with a clean washcloth for each bath utilizing the soap provided from the top of your body to the   bottom starting at the neck area.      Pay close attention to your armpits, groin area, and the site of surgery.       Wash your body gently for 5 minutes. Stand outside the stream or turn off the water while scrubbing your   body. Do NOT wash with your regular soap after the surgical cleanser is used.      RINSE THE CLEANSER OFF COMPLETELY with plenty of water. Rinse the area again thoroughly.      Dry off with a clean towel. The surgical soap can cause dryness; however do NOT APPLY LOTION,   CREAM, POWDER, and/or DEODORANT AFTER SHOWERING.     Be sure to where clean clothes after showering.      Ensure CLEAN BED LINENS AFTER FIRST wash with the surgical soap.      NO PETS ALLOWED IN THE BED with you after utilizing the surgical soap.

## 2024-12-30 ENCOUNTER — ANESTHESIA EVENT (OUTPATIENT)
Dept: PERIOP | Facility: HOSPITAL | Age: 79
End: 2024-12-30
Payer: COMMERCIAL

## 2025-01-02 ENCOUNTER — HOSPITAL ENCOUNTER (OUTPATIENT)
Facility: HOSPITAL | Age: 80
Discharge: HOME | End: 2025-01-03
Attending: UROLOGY | Admitting: UROLOGY
Payer: COMMERCIAL

## 2025-01-02 ENCOUNTER — ANESTHESIA (OUTPATIENT)
Dept: PERIOP | Facility: HOSPITAL | Age: 80
End: 2025-01-02
Payer: COMMERCIAL

## 2025-01-02 DIAGNOSIS — N13.8 BPH WITH OBSTRUCTION/LOWER URINARY TRACT SYMPTOMS: ICD-10-CM

## 2025-01-02 DIAGNOSIS — N40.1 BENIGN PROSTATIC HYPERPLASIA WITH URINARY OBSTRUCTION: ICD-10-CM

## 2025-01-02 DIAGNOSIS — N13.8 BENIGN PROSTATIC HYPERPLASIA WITH URINARY OBSTRUCTION: ICD-10-CM

## 2025-01-02 DIAGNOSIS — N40.1 BPH WITH OBSTRUCTION/LOWER URINARY TRACT SYMPTOMS: ICD-10-CM

## 2025-01-02 DIAGNOSIS — R13.12 OROPHARYNGEAL DYSPHAGIA: Primary | ICD-10-CM

## 2025-01-02 PROCEDURE — 25010000002 PROPOFOL 10 MG/ML EMULSION: Performed by: NURSE ANESTHETIST, CERTIFIED REGISTERED

## 2025-01-02 PROCEDURE — 52601 PROSTATECTOMY (TURP): CPT | Performed by: UROLOGY

## 2025-01-02 PROCEDURE — 25010000002 DEXAMETHASONE PER 1 MG: Performed by: NURSE ANESTHETIST, CERTIFIED REGISTERED

## 2025-01-02 PROCEDURE — 25010000002 MIDAZOLAM PER 1MG: Performed by: ANESTHESIOLOGY

## 2025-01-02 PROCEDURE — 25010000002 LIDOCAINE PF 2% 2 % SOLUTION: Performed by: NURSE ANESTHETIST, CERTIFIED REGISTERED

## 2025-01-02 PROCEDURE — 25810000003 SODIUM CHLORIDE 0.9 % SOLUTION: Performed by: UROLOGY

## 2025-01-02 PROCEDURE — 25010000002 ONDANSETRON PER 1 MG: Performed by: NURSE ANESTHETIST, CERTIFIED REGISTERED

## 2025-01-02 PROCEDURE — 88305 TISSUE EXAM BY PATHOLOGIST: CPT | Performed by: UROLOGY

## 2025-01-02 PROCEDURE — 25010000002 CEFTRIAXONE PER 250 MG: Performed by: UROLOGY

## 2025-01-02 PROCEDURE — 25010000002 FENTANYL CITRATE (PF) 50 MCG/ML SOLUTION: Performed by: NURSE ANESTHETIST, CERTIFIED REGISTERED

## 2025-01-02 PROCEDURE — 25810000003 LACTATED RINGERS PER 1000 ML: Performed by: ANESTHESIOLOGY

## 2025-01-02 PROCEDURE — 25010000002 SUGAMMADEX 200 MG/2ML SOLUTION: Performed by: NURSE ANESTHETIST, CERTIFIED REGISTERED

## 2025-01-02 RX ORDER — SODIUM CHLORIDE 0.9 % (FLUSH) 0.9 %
10 SYRINGE (ML) INJECTION AS NEEDED
Status: DISCONTINUED | OUTPATIENT
Start: 2025-01-02 | End: 2025-01-03 | Stop reason: HOSPADM

## 2025-01-02 RX ORDER — MEMANTINE HYDROCHLORIDE 5 MG/1
5 TABLET ORAL 2 TIMES DAILY
Status: DISCONTINUED | OUTPATIENT
Start: 2025-01-02 | End: 2025-01-03 | Stop reason: HOSPADM

## 2025-01-02 RX ORDER — MIDAZOLAM HYDROCHLORIDE 2 MG/2ML
0.5 INJECTION, SOLUTION INTRAMUSCULAR; INTRAVENOUS
Status: COMPLETED | OUTPATIENT
Start: 2025-01-02 | End: 2025-01-02

## 2025-01-02 RX ORDER — SODIUM CHLORIDE 0.9 % (FLUSH) 0.9 %
3 SYRINGE (ML) INJECTION EVERY 12 HOURS SCHEDULED
Status: DISCONTINUED | OUTPATIENT
Start: 2025-01-02 | End: 2025-01-02 | Stop reason: HOSPADM

## 2025-01-02 RX ORDER — ACETAMINOPHEN 650 MG/1
650 SUPPOSITORY RECTAL EVERY 4 HOURS PRN
Status: DISCONTINUED | OUTPATIENT
Start: 2025-01-02 | End: 2025-01-03 | Stop reason: HOSPADM

## 2025-01-02 RX ORDER — PROPOFOL 10 MG/ML
VIAL (ML) INTRAVENOUS AS NEEDED
Status: DISCONTINUED | OUTPATIENT
Start: 2025-01-02 | End: 2025-01-02 | Stop reason: SURG

## 2025-01-02 RX ORDER — SODIUM CHLORIDE 0.9 % (FLUSH) 0.9 %
10 SYRINGE (ML) INJECTION EVERY 12 HOURS SCHEDULED
Status: DISCONTINUED | OUTPATIENT
Start: 2025-01-02 | End: 2025-01-03 | Stop reason: HOSPADM

## 2025-01-02 RX ORDER — SODIUM CHLORIDE 0.9 % (FLUSH) 0.9 %
10 SYRINGE (ML) INJECTION AS NEEDED
Status: DISCONTINUED | OUTPATIENT
Start: 2025-01-02 | End: 2025-01-02 | Stop reason: HOSPADM

## 2025-01-02 RX ORDER — PHENYLEPHRINE HCL IN 0.9% NACL 0.5 MG/5ML
SYRINGE (ML) INTRAVENOUS AS NEEDED
Status: DISCONTINUED | OUTPATIENT
Start: 2025-01-02 | End: 2025-01-02 | Stop reason: SURG

## 2025-01-02 RX ORDER — FUROSEMIDE 20 MG/1
10 TABLET ORAL DAILY
Status: DISCONTINUED | OUTPATIENT
Start: 2025-01-03 | End: 2025-01-03 | Stop reason: HOSPADM

## 2025-01-02 RX ORDER — LOSARTAN POTASSIUM 50 MG/1
50 TABLET ORAL 2 TIMES DAILY
Status: DISCONTINUED | OUTPATIENT
Start: 2025-01-02 | End: 2025-01-03 | Stop reason: HOSPADM

## 2025-01-02 RX ORDER — ONDANSETRON 4 MG/1
4 TABLET, ORALLY DISINTEGRATING ORAL EVERY 6 HOURS PRN
Status: DISCONTINUED | OUTPATIENT
Start: 2025-01-02 | End: 2025-01-03 | Stop reason: HOSPADM

## 2025-01-02 RX ORDER — PROMETHAZINE HYDROCHLORIDE 12.5 MG/1
25 TABLET ORAL ONCE AS NEEDED
Status: DISCONTINUED | OUTPATIENT
Start: 2025-01-02 | End: 2025-01-02 | Stop reason: HOSPADM

## 2025-01-02 RX ORDER — PROMETHAZINE HYDROCHLORIDE 25 MG/1
25 SUPPOSITORY RECTAL ONCE AS NEEDED
Status: DISCONTINUED | OUTPATIENT
Start: 2025-01-02 | End: 2025-01-02 | Stop reason: HOSPADM

## 2025-01-02 RX ORDER — MIDAZOLAM HYDROCHLORIDE 2 MG/2ML
2 INJECTION, SOLUTION INTRAMUSCULAR; INTRAVENOUS ONCE
Status: DISCONTINUED | OUTPATIENT
Start: 2025-01-02 | End: 2025-01-02

## 2025-01-02 RX ORDER — METOPROLOL SUCCINATE 50 MG/1
50 TABLET, EXTENDED RELEASE ORAL DAILY
Status: DISCONTINUED | OUTPATIENT
Start: 2025-01-03 | End: 2025-01-03 | Stop reason: HOSPADM

## 2025-01-02 RX ORDER — MAGNESIUM HYDROXIDE 1200 MG/15ML
LIQUID ORAL AS NEEDED
Status: DISCONTINUED | OUTPATIENT
Start: 2025-01-02 | End: 2025-01-02 | Stop reason: HOSPADM

## 2025-01-02 RX ORDER — ACETAMINOPHEN 325 MG/1
650 TABLET ORAL EVERY 4 HOURS PRN
Status: DISCONTINUED | OUTPATIENT
Start: 2025-01-02 | End: 2025-01-03 | Stop reason: HOSPADM

## 2025-01-02 RX ORDER — PANTOPRAZOLE SODIUM 40 MG/1
40 TABLET, DELAYED RELEASE ORAL DAILY
Status: DISCONTINUED | OUTPATIENT
Start: 2025-01-03 | End: 2025-01-03 | Stop reason: HOSPADM

## 2025-01-02 RX ORDER — DEXAMETHASONE SODIUM PHOSPHATE 4 MG/ML
INJECTION, SOLUTION INTRA-ARTICULAR; INTRALESIONAL; INTRAMUSCULAR; INTRAVENOUS; SOFT TISSUE AS NEEDED
Status: DISCONTINUED | OUTPATIENT
Start: 2025-01-02 | End: 2025-01-02 | Stop reason: SURG

## 2025-01-02 RX ORDER — OXYCODONE HYDROCHLORIDE 5 MG/1
5 TABLET ORAL
Status: DISCONTINUED | OUTPATIENT
Start: 2025-01-02 | End: 2025-01-02 | Stop reason: HOSPADM

## 2025-01-02 RX ORDER — LIDOCAINE HYDROCHLORIDE 20 MG/ML
INJECTION, SOLUTION EPIDURAL; INFILTRATION; INTRACAUDAL; PERINEURAL AS NEEDED
Status: DISCONTINUED | OUTPATIENT
Start: 2025-01-02 | End: 2025-01-02 | Stop reason: SURG

## 2025-01-02 RX ORDER — METHOCARBAMOL 500 MG/1
500 TABLET, FILM COATED ORAL 3 TIMES DAILY
Status: DISCONTINUED | OUTPATIENT
Start: 2025-01-02 | End: 2025-01-02

## 2025-01-02 RX ORDER — FENTANYL CITRATE 50 UG/ML
INJECTION, SOLUTION INTRAMUSCULAR; INTRAVENOUS AS NEEDED
Status: DISCONTINUED | OUTPATIENT
Start: 2025-01-02 | End: 2025-01-02 | Stop reason: SURG

## 2025-01-02 RX ORDER — ONDANSETRON 2 MG/ML
4 INJECTION INTRAMUSCULAR; INTRAVENOUS EVERY 6 HOURS PRN
Status: DISCONTINUED | OUTPATIENT
Start: 2025-01-02 | End: 2025-01-03 | Stop reason: HOSPADM

## 2025-01-02 RX ORDER — ROCURONIUM BROMIDE 10 MG/ML
INJECTION, SOLUTION INTRAVENOUS AS NEEDED
Status: DISCONTINUED | OUTPATIENT
Start: 2025-01-02 | End: 2025-01-02 | Stop reason: SURG

## 2025-01-02 RX ORDER — ONDANSETRON 2 MG/ML
INJECTION INTRAMUSCULAR; INTRAVENOUS AS NEEDED
Status: DISCONTINUED | OUTPATIENT
Start: 2025-01-02 | End: 2025-01-02 | Stop reason: SURG

## 2025-01-02 RX ORDER — SODIUM CHLORIDE 9 MG/ML
40 INJECTION, SOLUTION INTRAVENOUS AS NEEDED
Status: DISCONTINUED | OUTPATIENT
Start: 2025-01-02 | End: 2025-01-02 | Stop reason: HOSPADM

## 2025-01-02 RX ORDER — DOCUSATE SODIUM 100 MG/1
100 CAPSULE, LIQUID FILLED ORAL 2 TIMES DAILY PRN
Status: DISCONTINUED | OUTPATIENT
Start: 2025-01-02 | End: 2025-01-03 | Stop reason: HOSPADM

## 2025-01-02 RX ORDER — ACETAMINOPHEN 500 MG
1000 TABLET ORAL ONCE
Status: COMPLETED | OUTPATIENT
Start: 2025-01-02 | End: 2025-01-02

## 2025-01-02 RX ORDER — SODIUM CHLORIDE 9 MG/ML
40 INJECTION, SOLUTION INTRAVENOUS AS NEEDED
Status: DISCONTINUED | OUTPATIENT
Start: 2025-01-02 | End: 2025-01-03 | Stop reason: HOSPADM

## 2025-01-02 RX ORDER — ONDANSETRON 2 MG/ML
4 INJECTION INTRAMUSCULAR; INTRAVENOUS ONCE AS NEEDED
Status: DISCONTINUED | OUTPATIENT
Start: 2025-01-02 | End: 2025-01-02 | Stop reason: HOSPADM

## 2025-01-02 RX ORDER — HYDRALAZINE HYDROCHLORIDE 10 MG/1
10 TABLET, FILM COATED ORAL EVERY 8 HOURS PRN
Status: DISCONTINUED | OUTPATIENT
Start: 2025-01-02 | End: 2025-01-03 | Stop reason: HOSPADM

## 2025-01-02 RX ORDER — OXYCODONE HYDROCHLORIDE 5 MG/1
5 TABLET ORAL EVERY 4 HOURS PRN
Status: DISCONTINUED | OUTPATIENT
Start: 2025-01-02 | End: 2025-01-03 | Stop reason: HOSPADM

## 2025-01-02 RX ORDER — SODIUM CHLORIDE 9 MG/ML
60 INJECTION, SOLUTION INTRAVENOUS CONTINUOUS
Status: DISCONTINUED | OUTPATIENT
Start: 2025-01-02 | End: 2025-01-03 | Stop reason: HOSPADM

## 2025-01-02 RX ORDER — SODIUM CHLORIDE, SODIUM LACTATE, POTASSIUM CHLORIDE, CALCIUM CHLORIDE 600; 310; 30; 20 MG/100ML; MG/100ML; MG/100ML; MG/100ML
9 INJECTION, SOLUTION INTRAVENOUS CONTINUOUS PRN
Status: ACTIVE | OUTPATIENT
Start: 2025-01-02 | End: 2025-01-03

## 2025-01-02 RX ADMIN — SUGAMMADEX 200 MG: 100 INJECTION, SOLUTION INTRAVENOUS at 14:29

## 2025-01-02 RX ADMIN — HYDRALAZINE HYDROCHLORIDE 10 MG: 10 TABLET ORAL at 23:59

## 2025-01-02 RX ADMIN — Medication 100 MCG: at 14:25

## 2025-01-02 RX ADMIN — OXYCODONE 5 MG: 5 TABLET ORAL at 15:06

## 2025-01-02 RX ADMIN — PROPOFOL 100 MG: 10 INJECTION, EMULSION INTRAVENOUS at 13:05

## 2025-01-02 RX ADMIN — Medication 100 MCG: at 13:40

## 2025-01-02 RX ADMIN — ROCURONIUM BROMIDE 50 MG: 50 INJECTION INTRAVENOUS at 13:05

## 2025-01-02 RX ADMIN — SODIUM CHLORIDE, POTASSIUM CHLORIDE, SODIUM LACTATE AND CALCIUM CHLORIDE 9 ML/HR: 600; 310; 30; 20 INJECTION, SOLUTION INTRAVENOUS at 12:00

## 2025-01-02 RX ADMIN — SODIUM CHLORIDE 60 ML/HR: 9 INJECTION, SOLUTION INTRAVENOUS at 15:40

## 2025-01-02 RX ADMIN — DEXAMETHASONE SODIUM PHOSPHATE 4 MG: 4 INJECTION, SOLUTION INTRAMUSCULAR; INTRAVENOUS at 13:19

## 2025-01-02 RX ADMIN — ACETAMINOPHEN 1000 MG: 500 TABLET ORAL at 11:59

## 2025-01-02 RX ADMIN — HYDRALAZINE HYDROCHLORIDE 10 MG: 10 TABLET ORAL at 17:11

## 2025-01-02 RX ADMIN — OXYCODONE 5 MG: 5 TABLET ORAL at 16:07

## 2025-01-02 RX ADMIN — LOSARTAN POTASSIUM 50 MG: 50 TABLET, FILM COATED ORAL at 20:59

## 2025-01-02 RX ADMIN — Medication 100 MCG: at 14:12

## 2025-01-02 RX ADMIN — LIDOCAINE HYDROCHLORIDE 60 MG: 20 INJECTION, SOLUTION INTRAVENOUS at 13:05

## 2025-01-02 RX ADMIN — Medication 10 ML: at 21:05

## 2025-01-02 RX ADMIN — MIDAZOLAM HYDROCHLORIDE 0.5 MG: 1 INJECTION, SOLUTION INTRAMUSCULAR; INTRAVENOUS at 11:59

## 2025-01-02 RX ADMIN — OXYCODONE 5 MG: 5 TABLET ORAL at 23:59

## 2025-01-02 RX ADMIN — MEMANTINE 5 MG: 5 TABLET ORAL at 20:59

## 2025-01-02 RX ADMIN — SODIUM CHLORIDE 1000 MG: 9 INJECTION INTRAMUSCULAR; INTRAVENOUS; SUBCUTANEOUS at 13:09

## 2025-01-02 RX ADMIN — FENTANYL CITRATE 50 MCG: 50 INJECTION, SOLUTION INTRAMUSCULAR; INTRAVENOUS at 13:05

## 2025-01-02 RX ADMIN — ONDANSETRON 4 MG: 2 INJECTION INTRAMUSCULAR; INTRAVENOUS at 14:25
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
## 2025-01-02 NOTE — ANESTHESIA POSTPROCEDURE EVALUATION
Patient: Dixon Amador    Procedure Summary       Date: 01/02/25 Room / Location: [facility] OR 02 / [facility] MAIN OR    Anesthesia Start: 1259 Anesthesia Stop: 1444    Procedure: TRANSURETHRAL RESECTION OF PROSTATE, remove inside of prostate with scope Diagnosis:       Benign prostatic hyperplasia with urinary obstruction      (Benign prostatic hyperplasia with urinary obstruction [N40.1, N13.8])    Surgeons: Miguel Angel Oviedo MD Provider: Darrel Delgado MD    Anesthesia Type: general ASA Status: 4            Anesthesia Type: general    Vitals  Vitals Value Taken Time   /83 01/02/25 1522   Temp 36.2 °C (97.1 °F) 01/02/25 1510   Pulse 58 01/02/25 1524   Resp 14 01/02/25 1515   SpO2 99 % 01/02/25 1524   Vitals shown include unfiled device data.        Post Anesthesia Care and Evaluation    Patient location during evaluation: bedside  Patient participation: complete - patient participated  Level of consciousness: awake  Pain management: adequate    Airway patency: patent  PONV Status: none  Cardiovascular status: acceptable and stable  Respiratory status: acceptable  Hydration status: acceptable

## 2025-01-02 NOTE — ANESTHESIA PREPROCEDURE EVALUATION
Anesthesia Evaluation     Patient summary reviewed and Nursing notes reviewed   no history of anesthetic complications:                Airway   Mallampati: II  TM distance: <3 FB  Neck ROM: full  No difficulty expected  Dental    (+) edentulous    Pulmonary - normal exam    breath sounds clear to auscultation  (+) ,shortness of breath, sleep apnea  Cardiovascular - normal exam    ECG reviewed  PT is on anticoagulation therapy  Rhythm: regular  Rate: normal    (+) hypertension, past MI , CAD, cardiac stents (2/2023) , dysrhythmias Atrial Fib, CHF , hyperlipidemia,  carotid artery disease (s/p carotid stent)      Neuro/Psych  (+) CVA (s/p carotid stents in 2023), syncope, numbness, psychiatric history  GI/Hepatic/Renal/Endo    (+) GERD well controlled, liver disease, thyroid problem hypothyroidism    ROS Comment: Urinary retention, bleeding into bladder      Musculoskeletal     (+) neck pain  Abdominal    Substance History - negative use     OB/GYN negative ob/gyn ROS         Other   arthritis, blood dyscrasia (polycythemia vera),   history of cancer (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 Based upon the calculated wall thickness   and left ventricular mass, there is concentric hypertrophy. The left   ventricular systolic function is mildly reduced. The LVEF is visually   estimated at 45 - 50%. The left ventricular filling pressure is elevated.   ·  Right Ventricle: The right ventricle is normal in size. The right   ventricular systolic function is normal.   ·  Aortic Valve: There is mild aortic valve regurgitation.   ·  Pericardium: No pericardial effusion.                     Anesthesia Plan    ASA 4     general     (Patient understands anesthesia not responsible for dental damage. )  intravenous induction     Anesthetic plan, risks, benefits, and  alternatives have been provided, discussed and informed consent has been obtained with: patient.  Pre-procedure education provided    CODE STATUS:

## 2025-01-02 NOTE — OP NOTE
CYSTOSCOPY TRANSURETHRAL RESECTION OF PROSTATE  Procedure Report    Patient Name:  Dixon Amador  YOB: 1945    Date of Surgery:  1/2/2025      Pre-op Diagnosis:   Benign prostatic hyperplasia with urinary obstruction [N40.1, N13.8]       Postop diagnosis:    Same    Procedure/CPT® Codes:      Procedure(s):  TRANSURETHRAL RESECTION OF PROSTATE  scope    Staff:  Surgeon(s):  Miguel Angel Oviedo MD         Anesthesia: General    Estimated Blood Loss: minimal    Implants:    Nothing was implanted during the procedure    Specimen:          Specimens       ID Source Type Tests Collected By Collected At Frozen?    A Prostate Tissue TISSUE PATHOLOGY EXAM   Miguel Angel Oviedo MD 1/2/25 2460     Description: TURP chips    This specimen was not marked as sent.                Findings:    none    Complications: none    Description of Procedure:       Brief description of procedure    After informed consent patient was taken to the operating room.  Patient was placed supine and placed under general anesthesia by the anesthesia team.  Patient was prepped and draped in normal sterile fashion after being placed in dorsal lithotomy position.  A multidisciplinary timeout was undertaken documenting the correct patient site and procedure.  At this point a 22 rigid cystoscope was advanced into the urethra.  Anterior urethra and prostatic urethra were normal.  Bladder was examined and found to be normal.  Bilateral ureteral orifices were in normal anatomic location.  At this time I used a visual obturator to place a resectoscope into the bladder.  A large bipolar loop was then placed into the bladder and the median lobe was started.  This was resected down to the right above the verumontanum.  I then did the left lateral lobe and the right lateral lobe.  Also took a small amount of anterior tissue.  After this all pieces were removed from the bladder with Ellik evacuator.  Hemostasis was obtained.  22 Bangladeshi three-way  Almeida catheter was placed into the bladder with 30 cc of sterile water placed in the balloon.  This was placed to straight drainage and also attached to continuous bladder irrigation.  Patient tolerated the procedure well and was taken to the post anesthesia area without issue.          Miguel Angel Oviedo MD     Date: 1/2/2025  Time: 14:35 EST

## 2025-01-03 ENCOUNTER — TELEPHONE (OUTPATIENT)
Dept: FAMILY MEDICINE CLINIC | Age: 80
End: 2025-01-03
Payer: COMMERCIAL

## 2025-01-03 VITALS
DIASTOLIC BLOOD PRESSURE: 90 MMHG | BODY MASS INDEX: 22.19 KG/M2 | HEART RATE: 84 BPM | RESPIRATION RATE: 16 BRPM | HEIGHT: 68 IN | OXYGEN SATURATION: 97 % | WEIGHT: 146.39 LBS | TEMPERATURE: 97.9 F | SYSTOLIC BLOOD PRESSURE: 161 MMHG

## 2025-01-03 PROCEDURE — 94799 UNLISTED PULMONARY SVC/PX: CPT

## 2025-01-03 PROCEDURE — 92610 EVALUATE SWALLOWING FUNCTION: CPT

## 2025-01-03 PROCEDURE — 99024 POSTOP FOLLOW-UP VISIT: CPT | Performed by: UROLOGY

## 2025-01-03 RX ORDER — HYDROCODONE BITARTRATE AND ACETAMINOPHEN 5; 325 MG/1; MG/1
1 TABLET ORAL EVERY 6 HOURS PRN
Qty: 12 TABLET | Refills: 0 | Status: SHIPPED | OUTPATIENT
Start: 2025-01-03

## 2025-01-03 RX ORDER — HYDROXYZINE HYDROCHLORIDE 25 MG/1
50 TABLET, FILM COATED ORAL NIGHTLY PRN
Status: DISCONTINUED | OUTPATIENT
Start: 2025-01-03 | End: 2025-01-03 | Stop reason: HOSPADM

## 2025-01-03 RX ADMIN — LOSARTAN POTASSIUM 50 MG: 50 TABLET, FILM COATED ORAL at 08:18

## 2025-01-03 RX ADMIN — FUROSEMIDE 10 MG: 20 TABLET ORAL at 08:18

## 2025-01-03 RX ADMIN — METOPROLOL SUCCINATE 50 MG: 50 TABLET, EXTENDED RELEASE ORAL at 08:18

## 2025-01-03 RX ADMIN — LEVOTHYROXINE SODIUM 137 MCG: 0.03 TABLET ORAL at 07:20

## 2025-01-03 RX ADMIN — Medication 10 ML: at 08:18

## 2025-01-03 RX ADMIN — HYDROXYZINE HYDROCHLORIDE 50 MG: 25 TABLET, FILM COATED ORAL at 00:13

## 2025-01-03 RX ADMIN — MEMANTINE 5 MG: 5 TABLET ORAL at 08:18

## 2025-01-03 RX ADMIN — HYDRALAZINE HYDROCHLORIDE 10 MG: 10 TABLET ORAL at 12:34

## 2025-01-03 RX ADMIN — PANTOPRAZOLE SODIUM 40 MG: 40 TABLET, DELAYED RELEASE ORAL at 08:18

## 2025-01-03 NOTE — PLAN OF CARE
Goal Outcome Evaluation: patient is A&Ox4, has not slept very well, but has remained pain free. CBI has been running from moderate to fast, the color at the moment is red with minor sediments in it.
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
## 2025-01-03 NOTE — DISCHARGE INSTRUCTIONS
No lifting more than 20 pounds for 3 weeks    Remove Almeida catheter at home on Monday, make sure to cut balloon port to let the fluid out    Hold anticoagulation, Eliquis and aspirin for 1 week

## 2025-01-03 NOTE — PLAN OF CARE
Goal Outcome Evaluation:      ASSESSMENT/ PLAN OF CARE:  Patient presents with high risk of aspiration due to history of head neck cancer status post radiation treatment.  He exhibits signs symptoms of aspiration at bedside.  He also reports being told he needed a PEG however he refused.  Patient with longstanding history of dysphagia, high risk of aspiration, however reports no worsening in his symptoms.  Patient also declining modified barium swallow study despite education.  He did however verbalize understanding of his aspiration risk.    RECOMMENDATIONS:   1.   DIET: Cannot recommend safe oral intake based on bedside dysphagia evaluation.  Patient however wishing to continue his regular diet despite risk of aspiration.             Anticipated Discharge Disposition (SLP): other (see comments) (patient declined further dysphagia treatment)

## 2025-01-03 NOTE — PROGRESS NOTES
[facility]   Urology Progress Note    Patient Name: Dixon Amador  : 1945  MRN: [id number]  Primary Care Physician:  Shayy Galaviz APRN  Date of admission: 2025    Subjective   Subjective     Pain improved  Bleeding around catheter has subsided  Has not ambulated      Objective   Objective     Vitals:   Temp:  [95.6 °F (35.3 °C)-97.7 °F (36.5 °C)] 97.2 °F (36.2 °C)  Heart Rate:  [60-83] 83  Resp:  [13-18] 18  BP: (125-192)/(53-97) 180/97  FiO2 (%):  [58 %-100 %] 58 %  Physical Exam     Alert and oriented x3  No acute distress  Unlabored respirations  Nontender/nondistended   Catheter in place draining minimally blood-tinged urine on continuous bladder irrigation      Result Review    Result Review:  I have personally reviewed the results from the time of this admission to 1/3/2025 06:19 EST and agree with these findings:  []  Laboratory  []  Microbiology  []  Radiology  []  EKG/Telemetry   []  Cardiology/Vascular   []  Pathology  []  Old records  []  Other:      Assessment & Plan   Assessment / Plan     Brief Patient Summary:  Dixon Amador is a 79 y.o. male     Active Hospital Problems:  Active Hospital Problems    Diagnosis     **Benign prostatic hyperplasia with urinary obstruction     BPH with obstruction/lower urinary tract symptoms        Status post TURP 2025        Still requiring CBI currently will have nursing continue to wean down today.  Ambulate  Incentive spirometry  Holding anticoagulation    Possibly home later today if we get CBI off      Electronically signed by Miguel Angel Oviedo MD, 25, 6:19 AM EST.

## 2025-01-03 NOTE — PLAN OF CARE
Goal Outcome Evaluation:  Plan of Care Reviewed With: patient, spouse        Progress: improving  Outcome Evaluation: Patient VSS, discharging home with wife. Plan to follow up with urology next week. Education completed. CBI stopped and patient ambulated 150ft with x1 assist and walker.

## 2025-01-03 NOTE — DISCHARGE SUMMARY
[facility]         DISCHARGE SUMMARY    Patient Name: Dixon Amador  : 1945  MRN: [id number]    Date of Admission: 2025  Date of Discharge:  25   Primary Care Physician: Shayy Galaviz APRN    Consults       No orders found for last 30 day(s).            Surgical Procedures Since Admission:  Procedure(s):  TRANSURETHRAL RESECTION OF PROSTATE, remove inside of prostate with scope  Surgeon:  Miguel Angel Oviedo MD  Status:  1 Day Post-Op  -------------------      Presenting Problem:   Benign prostatic hyperplasia with urinary obstruction [N40.1, N13.8]  BPH with obstruction/lower urinary tract symptoms [N40.1, N13.8]    Active and Resolved Hospital Problems:  Active Hospital Problems    Diagnosis POA    **Benign prostatic hyperplasia with urinary obstruction [N40.1, N13.8] Unknown    BPH with obstruction/lower urinary tract symptoms [N40.1, N13.8] Yes      Resolved Hospital Problems   No resolved problems to display.         Hospital Course     Hospital Course:  Dixon Amador is a 79 y.o. male   Did well after TURP CBI weaned off over 24 hours discharged home    Day of Discharge     Vital Signs:  Temp:  [97.2 °F (36.2 °C)-97.9 °F (36.6 °C)] 97.9 °F (36.6 °C)  Heart Rate:  [66-84] 84  Resp:  [16-18] 16  BP: (150-183)/(63-97) 161/90  Output by Drain (mL) 25 0701 - 25 1900 25 1901 - 25 0700 25 0701 - 25 1543 Range Total   Continuous Bladder Irrigation Triple-lumen 22 Fr 4250 -200  4050         Pertinent  and/or Most Recent Results     LAB RESULTS:                  Brief Urine Lab Results  (Last result in the past 365 days)        Color   Clarity   Blood   Leuk Est   Nitrite   Protein   CREAT   Urine HCG        24 1200 Yellow   Turbid   Moderate (2+)   Large (3+)   Negative   >=300 mg/dL (3+)                 Microbiology Results (last 10 days)       ** No results found for the last 240 hours. **         Results for orders placed during the  hospital encounter of 11/17/22    Duplex Carotid Ultrasound CAR    Interpretation Summary    Proximal right internal carotid artery severe stenosis (80 to 99%)..    No significant left carotid bifurcation stenosis.    There is bilateral carotid bulb plaque.    Vertebral flow is antegrade bilaterally.    Clinical and/or angiographic correlation is advised regarding the findings in the right internal carotid artery.  Results for orders placed during the hospital encounter of 11/17/22    Duplex Carotid Ultrasound CAR    Interpretation Summary    Proximal right internal carotid artery severe stenosis (80 to 99%)..    No significant left carotid bifurcation stenosis.    There is bilateral carotid bulb plaque.    Vertebral flow is antegrade bilaterally.    Clinical and/or angiographic correlation is advised regarding the findings in the right internal carotid artery.      Labs Pending at Discharge:  Pending Labs       Order Current Status    Tissue Pathology Exam In process            Discharge Details        Discharge Medications        New Medications        Instructions Start Date   HYDROcodone-acetaminophen 5-325 MG per tablet  Commonly known as: NORCO   1 tablet, Oral, Every 6 Hours PRN             Continue These Medications        Instructions Start Date   ezetimibe 10 MG tablet  Commonly known as: ZETIA   10 mg, Oral, Daily      ferrous sulfate 325 (65 FE) MG tablet  Commonly known as: FerrouSul   325 mg, Oral, Daily With Breakfast      finasteride 5 MG tablet  Commonly known as: PROSCAR   5 mg, Oral, Daily      furosemide 20 MG tablet  Commonly known as: LASIX   10 mg, Oral, Daily      hydrOXYzine 50 MG tablet  Commonly known as: ATARAX   50 mg, Oral, Nightly PRN      levothyroxine 137 MCG tablet  Commonly known as: SYNTHROID, LEVOTHROID   137 mcg, Oral, Daily      losartan 100 MG tablet  Commonly known as: COZAAR   50 mg, Oral, 2 Times Daily      memantine 5 MG tablet  Commonly known as: NAMENDA   5 mg, Oral, 2  Times Daily      metoprolol succinate XL 50 MG 24 hr tablet  Commonly known as: TOPROL-XL   50 mg, Oral, Daily      pantoprazole 40 MG EC tablet  Commonly known as: PROTONIX   40 mg, Oral, Daily      tamsulosin 0.4 MG capsule 24 hr capsule  Commonly known as: FLOMAX   0.4 mg, Oral, Daily             Stop These Medications      apixaban 5 MG tablet tablet  Commonly known as: ELIQUIS     aspirin 81 MG chewable tablet              Allergies   Allergen Reactions    Atorvastatin Unknown - Low Severity     CAUSED JAUNDICE PER PT     Codeine Hallucinations and Unknown - Low Severity              Condition:    Good, stable    Physical exam:    Constitutional: Well-appearing, well-developed,  in no acute distress    Respiratory: Unlabored breathing    Abdominal: Nontender, nondistended, no rigidity or guarding, no hepatosplenomegaly    Genitourinary:     Bladder nonpalpable     Neurologic: Grossly oriented to person, place and time, judgment and insight intact, normal mood and affect          Discharge Disposition:  Home or Self Care    Diet:  Hospital:  Diet Order   Procedures    Diet: Regular/House; Fluid Consistency: Thin (IDDSI 0)       Discharge Activity:       Future Appointments   Date Time Provider Department Center   1/14/2025  1:45 PM Shayy Galaviz APRN [facility]   3/14/2025 10:15 AM Larissa Mendoza APRN [facility]               Follow-up in clinic in 1 week
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
## 2025-01-06 LAB
CYTO UR: NORMAL
LAB AP CASE REPORT: NORMAL
LAB AP CLINICAL INFORMATION: NORMAL
PATH REPORT.FINAL DX SPEC: NORMAL
PATH REPORT.GROSS SPEC: NORMAL

## 2025-01-07 NOTE — PROGRESS NOTES
Chief Complaint: Urologic complaint    Subjective         History of Present Illness  Dixon Amador is a 79 y.o. male         Urinary retention  Gross hematuria  BPH         1/2/2025 TURP - BPH        Urge incontinence, no stress incontinence.    Bladder gross hematuria at the beginning of his stream.    Fevers, no pain voiding about every 2 hours no straining      10/24 cystoscopy - 5 cm prostate, 1.5 cm median lobe.  No pathology some catheter reaction -18 Malay coudé placed within the procedure       Currently off Brilinta and Eliquis          Previous      9/24 cardiac clearance - Radu Galvez  - CAD with coronary stent placed in 2/24.  Can hold Eliquis 3 full days before surgery, he may not come off his antiplatelet therapy.  Currently he is on Effient.  Could switch to aspirin rather than Effient if that would help       9/25/2024 cystoscopy clot evacuation -Miriam -prostatic urethra bleeding     9/24  Started on Flomax and finasteride in the hospital, failed voiding trial     9/17/2024 patient had fall     9/17/2024 CT pelvis without - no acute fracture or malignancy.  Severe prostatomegaly.     9/19/2024 ED for gross hematuria with blood in catheter with low abdominal pain.  Catheter placed at [facility] last night for difficulty voiding.     9/19/2024   1.9, GFR 35, H/H 12/37 9/18/2024 UA-nitrite positive 3+ blood     2/23 has seen  urology in the past     No previous urologic surgery  No pelvic radiation        PSA     7/23    5.5                  Objective           Vital Signs:   There were no vitals taken for this visit.               Assessment and Plan    Diagnoses and all orders for this visit:    1. Benign prostatic hyperplasia with urinary retention (Primary)            Urinary retention  Gross hematuria  BPH       Stop Flomax and finasteride        Restart Eliquis on Sunday, if does okay after that can restart Brilinta on Wednesday this coming week    Follow-up in 3 months    PVR at  Follow-up

## 2025-01-10 ENCOUNTER — OFFICE VISIT (OUTPATIENT)
Dept: UROLOGY | Age: 80
End: 2025-01-10
Payer: MEDICARE

## 2025-01-10 DIAGNOSIS — N40.1 BENIGN PROSTATIC HYPERPLASIA WITH URINARY RETENTION: Primary | ICD-10-CM

## 2025-01-10 DIAGNOSIS — R33.8 BENIGN PROSTATIC HYPERPLASIA WITH URINARY RETENTION: Primary | ICD-10-CM

## 2025-01-10 LAB — URINE VOLUME: 51

## 2025-01-14 DIAGNOSIS — E03.2 HYPOTHYROIDISM DUE TO MEDICAMENTS AND OTHER EXOGENOUS SUBSTANCES: ICD-10-CM

## 2025-01-15 RX ORDER — LEVOTHYROXINE SODIUM 137 UG/1
137 TABLET ORAL DAILY
Qty: 90 TABLET | Refills: 0 | Status: SHIPPED | OUTPATIENT
Start: 2025-01-15

## 2025-01-20 LAB
QT INTERVAL: 468 MS
QTC INTERVAL: 484 MS

## 2025-01-21 ENCOUNTER — OFFICE VISIT (OUTPATIENT)
Dept: FAMILY MEDICINE CLINIC | Age: 80
End: 2025-01-21
Payer: MEDICARE

## 2025-01-21 ENCOUNTER — LAB (OUTPATIENT)
Dept: LAB | Facility: HOSPITAL | Age: 80
End: 2025-01-21
Payer: COMMERCIAL

## 2025-01-21 VITALS
HEART RATE: 63 BPM | OXYGEN SATURATION: 96 % | DIASTOLIC BLOOD PRESSURE: 64 MMHG | WEIGHT: 147.5 LBS | BODY MASS INDEX: 22.35 KG/M2 | SYSTOLIC BLOOD PRESSURE: 139 MMHG | TEMPERATURE: 97.6 F | HEIGHT: 68 IN

## 2025-01-21 DIAGNOSIS — R73.09 ELEVATED GLUCOSE: ICD-10-CM

## 2025-01-21 DIAGNOSIS — E03.2 HYPOTHYROIDISM DUE TO MEDICAMENTS AND OTHER EXOGENOUS SUBSTANCES: ICD-10-CM

## 2025-01-21 DIAGNOSIS — D63.8 ANEMIA, CHRONIC DISEASE: ICD-10-CM

## 2025-01-21 DIAGNOSIS — N40.0 BPH WITH ELEVATED PSA: ICD-10-CM

## 2025-01-21 DIAGNOSIS — Z13.6 SCREENING FOR CARDIOVASCULAR CONDITION: ICD-10-CM

## 2025-01-21 DIAGNOSIS — Z79.01 LONG TERM (CURRENT) USE OF ANTICOAGULANTS: ICD-10-CM

## 2025-01-21 DIAGNOSIS — R97.20 BPH WITH ELEVATED PSA: ICD-10-CM

## 2025-01-21 DIAGNOSIS — Z90.79 S/P TURP: Primary | ICD-10-CM

## 2025-01-21 LAB
ALBUMIN SERPL-MCNC: 3.9 G/DL (ref 3.5–5.2)
ALBUMIN/GLOB SERPL: 1.1 G/DL
ALP SERPL-CCNC: 137 U/L (ref 39–117)
ALT SERPL W P-5'-P-CCNC: 37 U/L (ref 1–41)
ANION GAP SERPL CALCULATED.3IONS-SCNC: 10 MMOL/L (ref 5–15)
AST SERPL-CCNC: 39 U/L (ref 1–40)
BILIRUB SERPL-MCNC: 0.2 MG/DL (ref 0–1.2)
BUN SERPL-MCNC: 31 MG/DL (ref 8–23)
BUN/CREAT SERPL: 21.1 (ref 7–25)
CALCIUM SPEC-SCNC: 9.4 MG/DL (ref 8.6–10.5)
CHLORIDE SERPL-SCNC: 99 MMOL/L (ref 98–107)
CO2 SERPL-SCNC: 27 MMOL/L (ref 22–29)
CREAT SERPL-MCNC: 1.47 MG/DL (ref 0.76–1.27)
DEPRECATED RDW RBC AUTO: 48.7 FL (ref 37–54)
EGFRCR SERPLBLD CKD-EPI 2021: 48.2 ML/MIN/1.73
ERYTHROCYTE [DISTWIDTH] IN BLOOD BY AUTOMATED COUNT: 15.7 % (ref 12.3–15.4)
GLOBULIN UR ELPH-MCNC: 3.4 GM/DL
GLUCOSE SERPL-MCNC: 88 MG/DL (ref 65–99)
HBA1C MFR BLD: 5.7 % (ref 4.8–5.6)
HCT VFR BLD AUTO: 41.2 % (ref 37.5–51)
HGB BLD-MCNC: 12.9 G/DL (ref 13–17.7)
MCH RBC QN AUTO: 26 PG (ref 26.6–33)
MCHC RBC AUTO-ENTMCNC: 31.3 G/DL (ref 31.5–35.7)
MCV RBC AUTO: 83.1 FL (ref 79–97)
PLATELET # BLD AUTO: 328 10*3/MM3 (ref 140–450)
PMV BLD AUTO: 9.4 FL (ref 6–12)
POTASSIUM SERPL-SCNC: 4.4 MMOL/L (ref 3.5–5.2)
PROT SERPL-MCNC: 7.3 G/DL (ref 6–8.5)
RBC # BLD AUTO: 4.96 10*6/MM3 (ref 4.14–5.8)
SODIUM SERPL-SCNC: 136 MMOL/L (ref 136–145)
T4 FREE SERPL-MCNC: 0.69 NG/DL (ref 0.92–1.68)
TSH SERPL DL<=0.05 MIU/L-ACNC: 6.97 UIU/ML (ref 0.27–4.2)
WBC NRBC COR # BLD AUTO: 7.09 10*3/MM3 (ref 3.4–10.8)

## 2025-01-21 PROCEDURE — 99214 OFFICE O/P EST MOD 30 MIN: CPT | Performed by: NURSE PRACTITIONER

## 2025-01-21 PROCEDURE — 3078F DIAST BP <80 MM HG: CPT | Performed by: NURSE PRACTITIONER

## 2025-01-21 PROCEDURE — 84443 ASSAY THYROID STIM HORMONE: CPT

## 2025-01-21 PROCEDURE — 80053 COMPREHEN METABOLIC PANEL: CPT

## 2025-01-21 PROCEDURE — 3075F SYST BP GE 130 - 139MM HG: CPT | Performed by: NURSE PRACTITIONER

## 2025-01-21 PROCEDURE — 83036 HEMOGLOBIN GLYCOSYLATED A1C: CPT

## 2025-01-21 PROCEDURE — 84439 ASSAY OF FREE THYROXINE: CPT

## 2025-01-21 PROCEDURE — 1111F DSCHRG MED/CURRENT MED MERGE: CPT | Performed by: NURSE PRACTITIONER

## 2025-01-21 PROCEDURE — 36415 COLL VENOUS BLD VENIPUNCTURE: CPT

## 2025-01-21 PROCEDURE — 1125F AMNT PAIN NOTED PAIN PRSNT: CPT | Performed by: NURSE PRACTITIONER

## 2025-01-21 PROCEDURE — 85027 COMPLETE CBC AUTOMATED: CPT

## 2025-01-21 PROCEDURE — G2211 COMPLEX E/M VISIT ADD ON: HCPCS | Performed by: NURSE PRACTITIONER

## 2025-01-21 RX ORDER — ASPIRIN 81 MG/1
81 TABLET ORAL DAILY
COMMUNITY
End: 2025-01-29 | Stop reason: SDUPTHER
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
## 2025-01-29 ENCOUNTER — TELEPHONE (OUTPATIENT)
Dept: FAMILY MEDICINE CLINIC | Age: 80
End: 2025-01-29
Payer: COMMERCIAL

## 2025-01-29 DIAGNOSIS — E61.1 IRON DEFICIENCY: ICD-10-CM

## 2025-01-29 RX ORDER — FERROUS SULFATE 325(65) MG
325 TABLET ORAL
Qty: 90 TABLET | Refills: 0 | Status: SHIPPED | OUTPATIENT
Start: 2025-01-29

## 2025-01-29 RX ORDER — MEMANTINE HYDROCHLORIDE 5 MG/1
5 TABLET ORAL 2 TIMES DAILY
Qty: 180 TABLET | Refills: 0 | Status: SHIPPED | OUTPATIENT
Start: 2025-01-29 | End: 2025-01-29 | Stop reason: SDUPTHER

## 2025-01-29 RX ORDER — ASPIRIN 81 MG/1
81 TABLET ORAL DAILY
Qty: 90 TABLET | Refills: 0 | Status: SHIPPED | OUTPATIENT
Start: 2025-01-29

## 2025-01-29 NOTE — TELEPHONE ENCOUNTER
"Caller: Dixon Amador \"Long\"    Relationship: Self    Best call back number: [phone]     Requested Prescriptions:   Requested Prescriptions     Pending Prescriptions Disp Refills    memantine (NAMENDA) 5 MG tablet 180 tablet 0     Sig: Take 1 tablet by mouth 2 (Two) Times a Day.        Pharmacy where request should be sent: [facility] [address] [place] [address] ARIC GALLO [facility] [phone] [facility] [phone]      Last office visit with prescribing clinician: 1/21/2025   Last telemedicine visit with prescribing clinician: Visit date not found   Next office visit with prescribing clinician: 3/17/2025     Additional details provided by patient: PATIENT STATES ORIGINAL PHARMACY IS UNABLE TO FILL AT THIS TIME PLEASE SEND TO PHARMACY LISTED.     Does the patient have less than a 3 day supply:  [x] Yes  [] No    Would you like a call back once the refill request has been completed: [] Yes [x] No    If the office needs to give you a call back, can they leave a voicemail: [] Yes [x] No    Vlad Griffin Rep   01/29/25 16:39 EST         "

## 2025-01-30 RX ORDER — MEMANTINE HYDROCHLORIDE 5 MG/1
5 TABLET ORAL 2 TIMES DAILY
Qty: 180 TABLET | Refills: 0 | Status: SHIPPED | OUTPATIENT
Start: 2025-01-30

## 2025-02-11 DIAGNOSIS — E78.5 HYPERLIPIDEMIA, UNSPECIFIED HYPERLIPIDEMIA TYPE: ICD-10-CM

## 2025-02-12 RX ORDER — EZETIMIBE 10 MG/1
10 TABLET ORAL DAILY
Qty: 30 TABLET | Refills: 3 | Status: SHIPPED | OUTPATIENT
Start: 2025-02-12

## 2025-03-17 ENCOUNTER — OFFICE VISIT (OUTPATIENT)
Age: 80
End: 2025-03-17
Payer: COMMERCIAL

## 2025-03-17 VITALS
DIASTOLIC BLOOD PRESSURE: 58 MMHG | BODY MASS INDEX: 23.49 KG/M2 | WEIGHT: 155 LBS | SYSTOLIC BLOOD PRESSURE: 116 MMHG | HEART RATE: 66 BPM | HEIGHT: 68 IN

## 2025-03-17 DIAGNOSIS — I48.0 PAROXYSMAL ATRIAL FIBRILLATION: ICD-10-CM

## 2025-03-17 DIAGNOSIS — I73.9 PVD (PERIPHERAL VASCULAR DISEASE): ICD-10-CM

## 2025-03-17 DIAGNOSIS — I25.10 CORONARY ARTERY DISEASE INVOLVING NATIVE CORONARY ARTERY OF NATIVE HEART WITHOUT ANGINA PECTORIS: Primary | ICD-10-CM

## 2025-03-17 DIAGNOSIS — I73.9 PERIPHERAL VASCULAR DISEASE: ICD-10-CM

## 2025-03-17 DIAGNOSIS — E78.2 MIXED HYPERLIPIDEMIA: ICD-10-CM

## 2025-03-17 DIAGNOSIS — I10 ESSENTIAL (PRIMARY) HYPERTENSION: ICD-10-CM

## 2025-03-17 RX ORDER — FLUTICASONE FUROATE AND VILANTEROL 100; 25 UG/1; UG/1
1 POWDER RESPIRATORY (INHALATION)
COMMUNITY

## 2025-03-17 RX ORDER — FINASTERIDE 5 MG/1
5 TABLET, FILM COATED ORAL DAILY
COMMUNITY

## 2025-03-17 RX ORDER — TAMSULOSIN HYDROCHLORIDE 0.4 MG/1
1 CAPSULE ORAL DAILY
COMMUNITY
End: 2025-03-20 | Stop reason: ALTCHOICE

## 2025-03-17 NOTE — ASSESSMENT & PLAN NOTE
Blood pressure is stable and controlled.  Patient monitors blood pressure at home.  Continue losartan 100 mg  0.5 tablet twice daily and metoprolol succinate XL 50 mg daily.

## 2025-03-17 NOTE — ASSESSMENT & PLAN NOTE
Recent LDL 4/22/2024 was 62, which is at goal. Continue Zetia 10 mg daily. Sees PCP in few weeks , patient reports PCP draws labs advised needs Lipid panel
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
## 2025-03-17 NOTE — ASSESSMENT & PLAN NOTE
PCI February 2024 -clinically stable without angina.  Continue low-dose aspirin and Zetia 10 mg daily

## 2025-03-17 NOTE — ASSESSMENT & PLAN NOTE
Clinically stable, maintaining normal sinus rhythm. Patient denies current bleeding issues , recent CBC is stable.  Continue Eliquis 5 mg twice daily and metoprolol succinate XL 50 mg daily  If additional bleeding occurs, will likely send to EP for consideration of Watchman device. Patient is agreeable to this plan.

## 2025-03-20 ENCOUNTER — LAB (OUTPATIENT)
Dept: LAB | Facility: HOSPITAL | Age: 80
End: 2025-03-20
Payer: COMMERCIAL

## 2025-03-20 ENCOUNTER — OFFICE VISIT (OUTPATIENT)
Dept: FAMILY MEDICINE CLINIC | Age: 80
End: 2025-03-20
Payer: COMMERCIAL

## 2025-03-20 VITALS
HEIGHT: 68 IN | DIASTOLIC BLOOD PRESSURE: 74 MMHG | TEMPERATURE: 97.9 F | WEIGHT: 152.4 LBS | OXYGEN SATURATION: 97 % | HEART RATE: 67 BPM | SYSTOLIC BLOOD PRESSURE: 112 MMHG | BODY MASS INDEX: 23.1 KG/M2

## 2025-03-20 DIAGNOSIS — D63.8 ANEMIA, CHRONIC DISEASE: ICD-10-CM

## 2025-03-20 DIAGNOSIS — R41.3 MEMORY DEFICIT: ICD-10-CM

## 2025-03-20 DIAGNOSIS — E78.2 MIXED HYPERLIPIDEMIA: ICD-10-CM

## 2025-03-20 DIAGNOSIS — L98.9 SKIN LESION OF FACE: Primary | ICD-10-CM

## 2025-03-20 DIAGNOSIS — E03.2 HYPOTHYROIDISM DUE TO MEDICAMENTS AND OTHER EXOGENOUS SUBSTANCES: ICD-10-CM

## 2025-03-20 DIAGNOSIS — Z13.6 SCREENING FOR CARDIOVASCULAR CONDITION: ICD-10-CM

## 2025-03-20 DIAGNOSIS — E83.110 HEREDITARY HEMOCHROMATOSIS: ICD-10-CM

## 2025-03-20 LAB
ALBUMIN SERPL-MCNC: 3.6 G/DL (ref 3.5–5.2)
ALBUMIN/GLOB SERPL: 1 G/DL
ALP SERPL-CCNC: 121 U/L (ref 39–117)
ALT SERPL W P-5'-P-CCNC: 21 U/L (ref 1–41)
ANION GAP SERPL CALCULATED.3IONS-SCNC: 10.5 MMOL/L (ref 5–15)
AST SERPL-CCNC: 26 U/L (ref 1–40)
BILIRUB SERPL-MCNC: 0.5 MG/DL (ref 0–1.2)
BUN SERPL-MCNC: 42 MG/DL (ref 8–23)
BUN/CREAT SERPL: 24 (ref 7–25)
CALCIUM SPEC-SCNC: 9.7 MG/DL (ref 8.6–10.5)
CHLORIDE SERPL-SCNC: 104 MMOL/L (ref 98–107)
CHOLEST SERPL-MCNC: 121 MG/DL (ref 0–200)
CO2 SERPL-SCNC: 26.5 MMOL/L (ref 22–29)
CREAT SERPL-MCNC: 1.75 MG/DL (ref 0.76–1.27)
DEPRECATED RDW RBC AUTO: 52.6 FL (ref 37–54)
EGFRCR SERPLBLD CKD-EPI 2021: 39.1 ML/MIN/1.73
ERYTHROCYTE [DISTWIDTH] IN BLOOD BY AUTOMATED COUNT: 17.5 % (ref 12.3–15.4)
FERRITIN SERPL-MCNC: 101 NG/ML (ref 30–400)
GLOBULIN UR ELPH-MCNC: 3.6 GM/DL
GLUCOSE SERPL-MCNC: 101 MG/DL (ref 65–99)
HCT VFR BLD AUTO: 42.2 % (ref 37.5–51)
HDLC SERPL-MCNC: 43 MG/DL (ref 40–60)
HGB BLD-MCNC: 13 G/DL (ref 13–17.7)
IRON 24H UR-MRATE: 74 MCG/DL (ref 59–158)
IRON SATN MFR SERPL: 20 % (ref 20–50)
LDLC SERPL CALC-MCNC: 62 MG/DL (ref 0–100)
LDLC/HDLC SERPL: 1.43 {RATIO}
MCH RBC QN AUTO: 25.9 PG (ref 26.6–33)
MCHC RBC AUTO-ENTMCNC: 30.8 G/DL (ref 31.5–35.7)
MCV RBC AUTO: 84.2 FL (ref 79–97)
PLATELET # BLD AUTO: 288 10*3/MM3 (ref 140–450)
PMV BLD AUTO: 10.3 FL (ref 6–12)
POTASSIUM SERPL-SCNC: 4.7 MMOL/L (ref 3.5–5.2)
PROT SERPL-MCNC: 7.2 G/DL (ref 6–8.5)
RBC # BLD AUTO: 5.01 10*6/MM3 (ref 4.14–5.8)
SODIUM SERPL-SCNC: 141 MMOL/L (ref 136–145)
TIBC SERPL-MCNC: 370 MCG/DL (ref 298–536)
TRANSFERRIN SERPL-MCNC: 248 MG/DL (ref 200–360)
TRIGL SERPL-MCNC: 83 MG/DL (ref 0–150)
TSH SERPL DL<=0.05 MIU/L-ACNC: 0.14 UIU/ML (ref 0.27–4.2)
VLDLC SERPL-MCNC: 16 MG/DL (ref 5–40)
WBC NRBC COR # BLD AUTO: 6.89 10*3/MM3 (ref 3.4–10.8)

## 2025-03-20 PROCEDURE — 80050 GENERAL HEALTH PANEL: CPT

## 2025-03-20 PROCEDURE — 83540 ASSAY OF IRON: CPT

## 2025-03-20 PROCEDURE — 80061 LIPID PANEL: CPT

## 2025-03-20 PROCEDURE — 84466 ASSAY OF TRANSFERRIN: CPT

## 2025-03-20 PROCEDURE — 82728 ASSAY OF FERRITIN: CPT

## 2025-03-20 PROCEDURE — 36415 COLL VENOUS BLD VENIPUNCTURE: CPT

## 2025-03-20 RX ORDER — MEMANTINE HYDROCHLORIDE 5 MG/1
5 TABLET ORAL 2 TIMES DAILY
Qty: 180 TABLET | Refills: 3 | Status: SHIPPED | OUTPATIENT
Start: 2025-03-20
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
## 2025-03-21 ENCOUNTER — RESULTS FOLLOW-UP (OUTPATIENT)
Dept: FAMILY MEDICINE CLINIC | Age: 80
End: 2025-03-21
Payer: COMMERCIAL

## 2025-03-21 DIAGNOSIS — E03.2 HYPOTHYROIDISM DUE TO MEDICAMENTS AND OTHER EXOGENOUS SUBSTANCES: ICD-10-CM

## 2025-03-21 DIAGNOSIS — N28.9 ABNORMAL KIDNEY FUNCTION: Primary | ICD-10-CM

## 2025-03-21 RX ORDER — LEVOTHYROXINE SODIUM 125 UG/1
125 TABLET ORAL
Qty: 90 TABLET | Refills: 1 | Status: SHIPPED | OUTPATIENT
Start: 2025-03-21

## 2025-04-08 PROBLEM — N40.1 BENIGN PROSTATIC HYPERPLASIA WITH LOWER URINARY TRACT SYMPTOMS: Status: ACTIVE | Noted: 2025-04-08

## 2025-04-08 NOTE — PROGRESS NOTES
Chief Complaint: Urologic complaint    Subjective         History of Present Illness  Dixon Amador is a 79 y.o. male         Urinary retention  Gross hematuria  BPH       1/2/2025 TURP - BPH       currently on Eliquis    He is having a hard time as if he starts Brilinta back he does start bleeding.    Nocturia x 1.    He has tried to start Brilinta back a few times and had some gross blood with some minor clots      Patient had a catheter before surgery    He is having some postvoid dribbling, no incontinence otherwise.  He is wearing a safety pad because of the      Voiding with a good stream          PVR    4/25  047            Previous    10/24 cystoscopy - 5 cm prostate, 1.5 cm median lobe.  No pathology some catheter reaction -18 Estonian coudé placed within the procedure    9/24 cardiac clearance - Radu Galvez  - CAD with coronary stent placed in 2/24.  Can hold Eliquis 3 full days before surgery, he may not come off his antiplatelet therapy.  Currently he is on Effient.  Could switch to aspirin rather than Effient if that would help       9/25/2024 cystoscopy clot evacuation -Miriam -prostatic urethra bleeding     9/24  Started on Flomax and finasteride in the hospital, failed voiding trial     9/17/2024 patient had fall     9/17/2024 CT pelvis without - no acute fracture or malignancy.  Severe prostatomegaly.     9/19/2024 ED for gross hematuria with blood in catheter with low abdominal pain.  Catheter placed at [facility] last night for difficulty voiding.     9/19/2024   1.9, GFR 35, H/H 12/37 9/18/2024 UA-nitrite positive 3+ blood     2/23 has seen  urology in the past     No previous urologic surgery  No pelvic radiation        PSA     7/23    5.5                  Objective           Vital Signs:   There were no vitals taken for this visit.               Assessment and Plan    Diagnoses and all orders for this visit:    1. Benign prostatic hyperplasia with lower urinary tract symptoms, symptom details  unspecified (Primary)            Urinary retention  Gross hematuria  BPH           Cont Eliquis     F/u as needed.

## 2025-04-10 ENCOUNTER — TELEPHONE (OUTPATIENT)
Dept: FAMILY MEDICINE CLINIC | Age: 80
End: 2025-04-10
Payer: COMMERCIAL

## 2025-04-10 DIAGNOSIS — E61.1 IRON DEFICIENCY: ICD-10-CM

## 2025-04-11 ENCOUNTER — OFFICE VISIT (OUTPATIENT)
Dept: UROLOGY | Age: 80
End: 2025-04-11
Payer: MEDICARE

## 2025-04-11 VITALS — BODY MASS INDEX: 23.02 KG/M2 | HEIGHT: 68 IN | WEIGHT: 151.9 LBS

## 2025-04-11 DIAGNOSIS — N40.1 BENIGN PROSTATIC HYPERPLASIA WITH LOWER URINARY TRACT SYMPTOMS, SYMPTOM DETAILS UNSPECIFIED: Primary | ICD-10-CM

## 2025-04-11 LAB — URINE VOLUME: NORMAL

## 2025-04-12 RX ORDER — FERROUS SULFATE 325(65) MG
1 TABLET ORAL
Qty: 90 TABLET | Refills: 11 | Status: SHIPPED | OUTPATIENT
Start: 2025-04-12

## 2025-04-12 RX ORDER — ASPIRIN 81 MG/1
81 TABLET, COATED ORAL DAILY
Qty: 90 TABLET | Refills: 11 | Status: SHIPPED | OUTPATIENT
Start: 2025-04-12

## 2025-04-16 ENCOUNTER — TELEPHONE (OUTPATIENT)
Dept: FAMILY MEDICINE CLINIC | Age: 80
End: 2025-04-16
Payer: COMMERCIAL

## 2025-04-16 NOTE — TELEPHONE ENCOUNTER
Pts wife dropped off FMLA forms to be completed. The $20 fee was collected. Paperwork attached to this encounter and placed in Bobbi's mailbox.

## 2025-04-23 ENCOUNTER — LAB (OUTPATIENT)
Dept: LAB | Facility: HOSPITAL | Age: 80
End: 2025-04-23
Payer: COMMERCIAL

## 2025-04-23 ENCOUNTER — CLINICAL SUPPORT (OUTPATIENT)
Dept: FAMILY MEDICINE CLINIC | Age: 80
End: 2025-04-23
Payer: MEDICARE

## 2025-04-23 DIAGNOSIS — N28.9 ABNORMAL KIDNEY FUNCTION: ICD-10-CM

## 2025-04-23 DIAGNOSIS — H61.21 IMPACTED CERUMEN OF RIGHT EAR: Primary | ICD-10-CM

## 2025-04-23 LAB
ALBUMIN SERPL-MCNC: 3.7 G/DL (ref 3.5–5.2)
ALBUMIN/GLOB SERPL: 1.1 G/DL
ALP SERPL-CCNC: 114 U/L (ref 39–117)
ALT SERPL W P-5'-P-CCNC: 24 U/L (ref 1–41)
ANION GAP SERPL CALCULATED.3IONS-SCNC: 11.6 MMOL/L (ref 5–15)
AST SERPL-CCNC: 30 U/L (ref 1–40)
BILIRUB SERPL-MCNC: 0.5 MG/DL (ref 0–1.2)
BUN SERPL-MCNC: 35 MG/DL (ref 8–23)
BUN/CREAT SERPL: 22.9 (ref 7–25)
CALCIUM SPEC-SCNC: 9.7 MG/DL (ref 8.6–10.5)
CHLORIDE SERPL-SCNC: 101 MMOL/L (ref 98–107)
CO2 SERPL-SCNC: 27.4 MMOL/L (ref 22–29)
CREAT SERPL-MCNC: 1.53 MG/DL (ref 0.76–1.27)
EGFRCR SERPLBLD CKD-EPI 2021: 46 ML/MIN/1.73
GLOBULIN UR ELPH-MCNC: 3.5 GM/DL
GLUCOSE SERPL-MCNC: 113 MG/DL (ref 65–99)
POTASSIUM SERPL-SCNC: 4.6 MMOL/L (ref 3.5–5.2)
PROT SERPL-MCNC: 7.2 G/DL (ref 6–8.5)
SODIUM SERPL-SCNC: 140 MMOL/L (ref 136–145)

## 2025-04-23 PROCEDURE — 80053 COMPREHEN METABOLIC PANEL: CPT

## 2025-04-23 PROCEDURE — 36415 COLL VENOUS BLD VENIPUNCTURE: CPT

## 2025-04-23 PROCEDURE — 69209 REMOVE IMPACTED EAR WAX UNI: CPT | Performed by: FAMILY MEDICINE

## 2025-04-23 NOTE — PROGRESS NOTES
Procedure   Ear Cerumen Removal    Date/Time: 4/23/2025 2:03 PM    Performed by: Trista Smith  Authorized by: Jill Buitrago MD    Anesthesia:  Local Anesthetic: none  Ceruminolytics applied: Ceruminolytics applied prior to the procedure.  Location details: right ear  Patient tolerance: patient tolerated the procedure well with no immediate complications  Procedure type: irrigation   Sedation:  Patient sedated: no

## 2025-05-01 ENCOUNTER — TELEPHONE (OUTPATIENT)
Dept: FAMILY MEDICINE CLINIC | Age: 80
End: 2025-05-01
Payer: COMMERCIAL

## 2025-05-01 NOTE — TELEPHONE ENCOUNTER
Caller: PAMELA ESTEVEZ    Relationship: Emergency Contact    Best call back number:      What is the medical concern/diagnosis: PAIN IN RIGHT EAR AND TROUBLE HEARING     What specialty or service is being requested: ENT     What is the provider, practice or medical service name: N/A     What is the office location:      What is the office phone number: N/A     Any additional details:           THE PATIENT'S WIFE SAID THE PATIENT HAS BEEN HAVING PAIN IN HIS EAR SINCE SUNDAY

## 2025-05-02 NOTE — TELEPHONE ENCOUNTER
Pt is on day 5 of abx and was seen at the er for this issue. No appt here today trc    Niacinamide Pregnancy And Lactation Text: These medications are considered safe during pregnancy.

## 2025-05-16 ENCOUNTER — OFFICE VISIT (OUTPATIENT)
Dept: FAMILY MEDICINE CLINIC | Age: 80
End: 2025-05-16
Payer: COMMERCIAL

## 2025-05-16 VITALS
SYSTOLIC BLOOD PRESSURE: 123 MMHG | WEIGHT: 154 LBS | TEMPERATURE: 97.9 F | BODY MASS INDEX: 23.34 KG/M2 | HEART RATE: 65 BPM | DIASTOLIC BLOOD PRESSURE: 76 MMHG | HEIGHT: 68 IN | OXYGEN SATURATION: 96 %

## 2025-05-16 DIAGNOSIS — I10 ESSENTIAL (PRIMARY) HYPERTENSION: ICD-10-CM

## 2025-05-16 DIAGNOSIS — H92.01 RIGHT EAR PAIN: Primary | ICD-10-CM

## 2025-05-16 DIAGNOSIS — H69.91 DYSFUNCTION OF RIGHT EUSTACHIAN TUBE: ICD-10-CM

## 2025-05-16 DIAGNOSIS — H91.91 DECREASED HEARING OF RIGHT EAR: ICD-10-CM

## 2025-05-16 RX ORDER — AMOXICILLIN 875 MG/1
1 TABLET, COATED ORAL EVERY 12 HOURS SCHEDULED
COMMUNITY
Start: 2025-04-28 | End: 2025-05-16

## 2025-05-16 RX ORDER — FLUTICASONE PROPIONATE 50 MCG
2 SPRAY, SUSPENSION (ML) NASAL DAILY
Qty: 16 G | Refills: 0 | Status: SHIPPED | OUTPATIENT
Start: 2025-05-16

## 2025-05-16 NOTE — ASSESSMENT & PLAN NOTE
Blood pressure elevated today, improved on recheck. Monitor at home, follow up with PCP if remains elevated.
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
## 2025-06-10 DIAGNOSIS — R41.3 MEMORY DEFICIT: ICD-10-CM

## 2025-06-10 DIAGNOSIS — I10 ESSENTIAL (PRIMARY) HYPERTENSION: ICD-10-CM

## 2025-06-10 DIAGNOSIS — E78.5 HYPERLIPIDEMIA, UNSPECIFIED HYPERLIPIDEMIA TYPE: ICD-10-CM

## 2025-06-11 RX ORDER — METOPROLOL SUCCINATE 50 MG/1
50 TABLET, EXTENDED RELEASE ORAL DAILY
Qty: 90 TABLET | Refills: 3 | Status: SHIPPED | OUTPATIENT
Start: 2025-06-11

## 2025-06-11 RX ORDER — MEMANTINE HYDROCHLORIDE 5 MG/1
5 TABLET ORAL 2 TIMES DAILY
Qty: 180 TABLET | Refills: 11 | OUTPATIENT
Start: 2025-06-11

## 2025-06-11 RX ORDER — APIXABAN 5 MG/1
5 TABLET, FILM COATED ORAL EVERY 12 HOURS SCHEDULED
Qty: 180 TABLET | Refills: 3 | Status: SHIPPED | OUTPATIENT
Start: 2025-06-11

## 2025-06-11 RX ORDER — EZETIMIBE 10 MG/1
10 TABLET ORAL DAILY
Qty: 90 TABLET | Refills: 0 | Status: SHIPPED | OUTPATIENT
Start: 2025-06-11

## 2025-06-11 RX ORDER — FUROSEMIDE 20 MG/1
10 TABLET ORAL DAILY
Qty: 45 TABLET | Refills: 3 | Status: SHIPPED | OUTPATIENT
Start: 2025-06-11

## 2025-06-18 ENCOUNTER — OFFICE VISIT (OUTPATIENT)
Dept: CARDIOLOGY | Facility: CLINIC | Age: 80
End: 2025-06-18
Payer: COMMERCIAL

## 2025-06-18 VITALS
BODY MASS INDEX: 23.34 KG/M2 | HEIGHT: 68 IN | SYSTOLIC BLOOD PRESSURE: 132 MMHG | HEART RATE: 61 BPM | WEIGHT: 154 LBS | DIASTOLIC BLOOD PRESSURE: 70 MMHG

## 2025-06-18 DIAGNOSIS — I48.0 PAROXYSMAL ATRIAL FIBRILLATION: ICD-10-CM

## 2025-06-18 DIAGNOSIS — I10 ESSENTIAL (PRIMARY) HYPERTENSION: ICD-10-CM

## 2025-06-18 DIAGNOSIS — I25.10 CORONARY ARTERY DISEASE INVOLVING NATIVE CORONARY ARTERY OF NATIVE HEART WITHOUT ANGINA PECTORIS: Primary | ICD-10-CM

## 2025-06-18 DIAGNOSIS — E78.2 MIXED HYPERLIPIDEMIA: ICD-10-CM

## 2025-06-18 PROCEDURE — 99214 OFFICE O/P EST MOD 30 MIN: CPT | Performed by: INTERNAL MEDICINE
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
## 2025-07-10 ENCOUNTER — TELEPHONE (OUTPATIENT)
Dept: FAMILY MEDICINE CLINIC | Age: 80
End: 2025-07-10
Payer: COMMERCIAL

## 2025-07-10 NOTE — TELEPHONE ENCOUNTER
*HUB TO RELAY*  **IMPORTANT** *PLEASE SCHEDULE THIS PT WITH AN MD* Called pt to establish care with another provider. No answer, left VM.

## 2025-08-18 DIAGNOSIS — R41.3 MEMORY DEFICIT: ICD-10-CM

## 2025-08-18 RX ORDER — MEMANTINE HYDROCHLORIDE 5 MG/1
5 TABLET ORAL 2 TIMES DAILY
Qty: 180 TABLET | Refills: 0 | Status: SHIPPED | OUTPATIENT
Start: 2025-08-18

## 2025-08-26 DIAGNOSIS — I10 ESSENTIAL (PRIMARY) HYPERTENSION: ICD-10-CM

## 2025-08-26 RX ORDER — LOSARTAN POTASSIUM 100 MG/1
50 TABLET ORAL 2 TIMES DAILY
Qty: 90 TABLET | Refills: 3 | Status: SHIPPED | OUTPATIENT
Start: 2025-08-26 | End: 2025-08-26

## 2025-08-26 RX ORDER — LOSARTAN POTASSIUM 100 MG/1
50 TABLET ORAL 2 TIMES DAILY
Qty: 90 TABLET | Refills: 3 | Status: SHIPPED | OUTPATIENT
Start: 2025-08-26

## (undated) DEVICE — SYR CATH/TIP 50ML 2OZ STRL 1P/U

## (undated) DEVICE — CONTAINER,SPEC,PNEUM TUBE,4OZ,STRL PATH: Brand: MEDLINE

## (undated) DEVICE — METER,URINE,400ML,DRAIN BAG,L/F,LL,SLIDE: Brand: MEDLINE

## (undated) DEVICE — Y-TYPE TUR/BLADDER IRRIGATION SET, REGULATING CLAMP

## (undated) DEVICE — SET, IRRIGATION CYSTO, Y-TYPE, 81": Brand: MEDLINE

## (undated) DEVICE — SOL IRR NACL 0.9PCT 3000ML

## (undated) DEVICE — STERILE POLYISOPRENE POWDER-FREE SURGICAL GLOVES: Brand: PROTEXIS

## (undated) DEVICE — MAT FLR ABS W/BLU/LINER 56X72IN WHT

## (undated) DEVICE — SYRINGE,TOOMEY,IRRIGATION,70CC,STERILE: Brand: MEDLINE

## (undated) DEVICE — TRAY,IRRIGATION,BULBSYRINGE,60ML,CSR,PVP: Brand: MEDLINE

## (undated) DEVICE — GLOVE,SURG,SENSICARE SLT,LF,PF,8: Brand: MEDLINE

## (undated) DEVICE — CATH FOLEY LUBRICATH 3WY 22F 30CC

## (undated) DEVICE — GOWN,REINFORCE,POLY,SIRUS,BREATH SLV,XLG: Brand: MEDLINE

## (undated) DEVICE — CYSTO PACK: Brand: MEDLINE INDUSTRIES, INC.

## (undated) DEVICE — SLV SCD KN/LEN ADJ EXPRSS BLENDED MD 1P/U

## (undated) DEVICE — TRAY,IRRIGATION,PISTON SYRINGE,60ML: Brand: MEDLINE

## (undated) DEVICE — HF-RESECTION ELECTRODE PLASMALOOP LOOP, MEDIUM, 24 FR., 12°/16°, PK TURIS: Brand: OLYMPUS

## (undated) DEVICE — CATH FOL LUBRISIL 3WY 22F 30CC

## (undated) DEVICE — TOWEL,OR,DSP,ST,BLUE,STD,4/PK,20PK/CS: Brand: MEDLINE

## (undated) DEVICE — EVAC BLDR ELLIK 1P/U

## (undated) DEVICE — BAG,DRAINAGE,4L,A/R TOWER,METAL CLAMP: Brand: MEDLINE

## (undated) DEVICE — SKIN PREP TRAY W/CHG: Brand: MEDLINE INDUSTRIES, INC.

## (undated) DEVICE — Device

## (undated) DEVICE — TRAY,ADD A CATH,FOLEY,DRAIN BG,10ML SYR: Brand: MEDLINE

## (undated) DEVICE — SOL IRR NACL 0.9PCT BO 1000ML

## (undated) DEVICE — HF-RESECTION ELECTRODE PLASMALOOP LOOP, LARGE, 24 FR., 12°-30°, PK TURIS: Brand: OLYMPUS